# Patient Record
Sex: FEMALE | Race: WHITE | HISPANIC OR LATINO | Employment: UNEMPLOYED | URBAN - METROPOLITAN AREA
[De-identification: names, ages, dates, MRNs, and addresses within clinical notes are randomized per-mention and may not be internally consistent; named-entity substitution may affect disease eponyms.]

---

## 2017-01-04 ENCOUNTER — HOSPITAL ENCOUNTER (EMERGENCY)
Facility: MEDICAL CENTER | Age: 22
End: 2017-01-04
Attending: EMERGENCY MEDICINE
Payer: MEDICAID

## 2017-01-04 VITALS
DIASTOLIC BLOOD PRESSURE: 68 MMHG | HEIGHT: 65 IN | SYSTOLIC BLOOD PRESSURE: 131 MMHG | TEMPERATURE: 98.9 F | WEIGHT: 293 LBS | BODY MASS INDEX: 48.82 KG/M2 | OXYGEN SATURATION: 98 % | RESPIRATION RATE: 16 BRPM | HEART RATE: 74 BPM

## 2017-01-04 DIAGNOSIS — G43.009 MIGRAINE WITHOUT AURA AND WITHOUT STATUS MIGRAINOSUS, NOT INTRACTABLE: ICD-10-CM

## 2017-01-04 PROCEDURE — 36415 COLL VENOUS BLD VENIPUNCTURE: CPT

## 2017-01-04 PROCEDURE — 99284 EMERGENCY DEPT VISIT MOD MDM: CPT

## 2017-01-04 PROCEDURE — A9270 NON-COVERED ITEM OR SERVICE: HCPCS | Performed by: EMERGENCY MEDICINE

## 2017-01-04 PROCEDURE — 700102 HCHG RX REV CODE 250 W/ 637 OVERRIDE(OP): Performed by: EMERGENCY MEDICINE

## 2017-01-04 PROCEDURE — 700105 HCHG RX REV CODE 258: Performed by: EMERGENCY MEDICINE

## 2017-01-04 RX ORDER — DOXYLAMINE SUCCINATE AND PYRIDOXINE HYDROCHLORIDE, DELAYED RELEASE TABLETS 10 MG/10 MG 10; 10 MG/1; MG/1
1 TABLET, DELAYED RELEASE ORAL SEE ADMIN INSTRUCTIONS
Qty: 60 TAB | Refills: 0 | Status: ON HOLD | OUTPATIENT
Start: 2017-01-04 | End: 2017-08-01

## 2017-01-04 RX ORDER — ACETAMINOPHEN 325 MG/1
1000 TABLET ORAL ONCE
Status: COMPLETED | OUTPATIENT
Start: 2017-01-04 | End: 2017-01-04

## 2017-01-04 RX ORDER — SODIUM CHLORIDE 9 MG/ML
1000 INJECTION, SOLUTION INTRAVENOUS ONCE
Status: COMPLETED | OUTPATIENT
Start: 2017-01-04 | End: 2017-01-04

## 2017-01-04 RX ADMIN — ACETAMINOPHEN 975 MG: 325 TABLET, FILM COATED ORAL at 20:32

## 2017-01-04 RX ADMIN — SODIUM CHLORIDE 1000 ML: 9 INJECTION, SOLUTION INTRAVENOUS at 20:32

## 2017-01-04 ASSESSMENT — PAIN SCALES - GENERAL: PAINLEVEL_OUTOF10: 9

## 2017-01-04 ASSESSMENT — LIFESTYLE VARIABLES: DO YOU DRINK ALCOHOL: NO

## 2017-01-04 NOTE — ED AVS SNAPSHOT
[facility] Access Code: [email or web address]  Expires: 2/3/2017  9:21 AM    Your email address is not on file at [facility].  Email Addresses are required for you to sign up for [facility], please contact [phone] to verify your personal information and to provide your email address prior to attempting to register for [facility].    Mayra Rojas  [address] [place] [address]    [facility]  A secure, online tool to manage your health information     [facility]’s [facility]® is a secure, online tool that connects you to your personalized health information from the privacy of your home -- day or night - making it very easy for you to manage your healthcare. Once the activation process is completed, you can even access your medical information using the [facility] salbador, which is available for free in the Apple Salbador store or Google Play store.     To learn more about [facility], visit [email or web address]    There are two levels of access available (as shown below):   My Chart Features  [facility] Primary Care Doctor [facility]  Specialists [facility]  Urgent  Care Non-[facility] Primary Care Doctor   Email your healthcare team securely and privately 24/7 X X X    Manage appointments: schedule your next appointment; view details of past/upcoming appointments X      Request prescription refills. X      View recent personal medical records, including lab and immunizations X X X X   View health record, including health history, allergies, medications X X X X   Read reports about your outpatient visits, procedures, consult and ER notes X X X X   See your discharge summary, which is a recap of your hospital and/or ER visit that includes your diagnosis, lab results, and care plan X X  X     How to register for [facility]:  Once your e-mail address has been verified, follow the following steps to sign up for [facility].     1. Go to  [email or web address]  2. Click on the Sign Up Now box, which takes you to the New Member Sign Up  page. You will need to provide the following information:  a. Enter your [employer] Access Code exactly as it appears at the top of this page. (You will not need to use this code after you’ve completed the sign-up process. If you do not sign up before the expiration date, you must request a new code.)   b. Enter your date of birth.   c. Enter your home email address.   d. Click Submit, and follow the next screen’s instructions.  3. Create a [employer] ID. This will be your [employer] login ID and cannot be changed, so think of one that is secure and easy to remember.  4. Create a [employer] password. You can change your password at any time.  5. Enter your Password Reset Question and Answer. This can be used at a later time if you forget your password.   6. Enter your e-mail address. This allows you to receive e-mail notifications when new information is available in [employer]  7. Click Sign Up. You can now view your health information.    For assistance activating your [employer] account, call [phone]

## 2017-01-04 NOTE — ED AVS SNAPSHOT
After Visit Summary                                                                                                                Mayra Rojas   MRN: [id number]    Department:  [facility], Emergency Dept   Date of Visit:  1/4/2017            [facility], Emergency Dept    [address] [address]    Phone:  [phone]      You were seen by     Óscar Allen M.D.      Your Diagnosis Was     Migraine without aura and without status migrainosus, not intractable     G43.009       These are the medications you received during your hospitalization from 01/04/2017 1946 to 01/04/2017 2208     Date/Time Order Dose Route Action    01/04/2017 2032 NS infusion 1,000 mL 1,000 mL Intravenous New Bag    01/04/2017 2032 acetaminophen (TYLENOL) tablet 975 mg 975 mg Oral Given      Follow-up Information     1. Schedule an appointment as soon as possible for a visit with [facility], M.D..    Specialty:  OB/Gyn    Contact information    [address] [address]  [phone]        Medication Information     Review all of your home medications and newly ordered medications with your primary doctor and/or pharmacist as soon as possible. Follow medication instructions as directed by your doctor and/or pharmacist.     Please keep your complete medication list with you and share with your physician. Update the information when medications are discontinued, doses are changed, or new medications (including over-the-counter products) are added; and carry medication information at all times in the event of emergency situations.               Medication List      START taking these medications        Instructions    Doxylamine-Pyridoxine 10-10 MG Tbec delayed-release tablet   Commonly known as:  DICLEGIS    Doctor's comments:  If symptoms are controlled day 4, 1 tab morning and 2 tabs bedtime. If symptoms worse day 4,1 tab morning, 1 tab midafternoon,  and 2 tabs evening.   Take 1 Application by mouth See Admin Instructions. Day 1:  Two tabs at bedtime. If symptoms are controlled the next day, take 2 tabs at bedtime. If symptoms worse afternoon of day 2, take 2 tabs bedtime, 1 tab morningday 3, 2 tablets at bedtime. See below for additional dosing   Dose:  1 Application                 Discharge Instructions       You can take Tylenol at home for your headache.  Use the nausea medication as needed.    Migraine Headache  A migraine headache is an intense, throbbing pain on one or both sides of your head. A migraine can last for 30 minutes to several hours.  CAUSES   The exact cause of a migraine headache is not always known. However, a migraine may be caused when nerves in the brain become irritated and release chemicals that cause inflammation. This causes pain.  Certain things may also trigger migraines, such as:  · Alcohol.  · Smoking.  · Stress.  · Menstruation.  · Aged cheeses.  · Foods or drinks that contain nitrates, glutamate, aspartame, or tyramine.  · Lack of sleep.  · Chocolate.  · Caffeine.  · Hunger.  · Physical exertion.  · Fatigue.  · Medicines used to treat chest pain (nitroglycerine), birth control pills, estrogen, and some blood pressure medicines.  SIGNS AND SYMPTOMS  · Pain on one or both sides of your head.  · Pulsating or throbbing pain.  · Severe pain that prevents daily activities.  · Pain that is aggravated by any physical activity.  · Nausea, vomiting, or both.  · Dizziness.  · Pain with exposure to bright lights, loud noises, or activity.  · General sensitivity to bright lights, loud noises, or smells.  Before you get a migraine, you may get warning signs that a migraine is coming (aura). An aura may include:  · Seeing flashing lights.  · Seeing bright spots, halos, or zigzag lines.  · Having tunnel vision or blurred vision.  · Having feelings of numbness or tingling.  · Having trouble talking.  · Having muscle weakness.  DIAGNOSIS   A  migraine headache is often diagnosed based on:  · Symptoms.  · Physical exam.  · A CT scan or MRI of your head. These imaging tests cannot diagnose migraines, but they can help rule out other causes of headaches.  TREATMENT  Medicines may be given for pain and nausea. Medicines can also be given to help prevent recurrent migraines.   HOME CARE INSTRUCTIONS  · Only take over-the-counter or prescription medicines for pain or discomfort as directed by your health care provider. The use of long-term narcotics is not recommended.  · Lie down in a dark, quiet room when you have a migraine.  · Keep a journal to find out what may trigger your migraine headaches. For example, write down:  ¨ What you eat and drink.  ¨ How much sleep you get.  ¨ Any change to your diet or medicines.  · Limit alcohol consumption.  · Quit smoking if you smoke.  · Get 7-9 hours of sleep, or as recommended by your health care provider.  · Limit stress.  · Keep lights dim if bright lights bother you and make your migraines worse.  SEEK IMMEDIATE MEDICAL CARE IF:   · Your migraine becomes severe.  · You have a fever.  · You have a stiff neck.  · You have vision loss.  · You have muscular weakness or loss of muscle control.  · You start losing your balance or have trouble walking.  · You feel faint or pass out.  · You have severe symptoms that are different from your first symptoms.  MAKE SURE YOU:   · Understand these instructions.  · Will watch your condition.  · Will get help right away if you are not doing well or get worse.     This information is not intended to replace advice given to you by your health care provider. Make sure you discuss any questions you have with your health care provider.     Document Released: 12/18/2006 Document Revised: 01/08/2016 Document Reviewed: 08/25/2014  Elsevier Interactive Patient Education ©2016 Elsevier Inc.            Patient Information     Patient Information    Following emergency treatment: all patient  requiring follow-up care must return either to a private physician or a clinic if your condition worsens before you are able to obtain further medical attention, please return to the emergency room.     Billing Information    At [facility], we work to make the billing process streamlined for our patients.  Our Representatives are here to answer any questions you may have regarding your hospital bill.  If you have insurance coverage and have supplied your insurance information to us, we will submit a claim to your insurer on your behalf.  Should you have any questions regarding your bill, we can be reached online or by phone as follows:  Online: You are able pay your bills online or live chat with our representatives about any billing questions you may have. We are here to help Monday - Friday from 8:00am to 7:30pm and 9:00am - 12:00pm on Saturdays.  Please visit [email or web address]  for more information.   Phone:  [phone] or [phone]    Please note that your emergency physician, surgeon, pathologist, radiologist, anesthesiologist, and other specialists are not employed by [facility] and will therefore bill separately for their services.  Please contact them directly for any questions concerning their bills at the numbers below:     Emergency Physician Services:  [phone]  [facility]:  [phone]  [facility]:  [phone]  [facility]:  [phone]    1. Your final bill may vary from the amount quoted upon discharge if all procedures are not complete at that time, or if your doctor has additional procedures of which we are not aware. You will receive an additional bill if you return to the Emergency Department at [facility] for suture removal regardless of the facility of which the sutures were placed.     2. Please arrange for settlement of this account at the emergency registration.    3. All self-pay accounts  are due in full at the time of treatment.  If you are unable to meet this obligation then payment is expected within 4-5 days.     4. If you have had radiology studies (CT, X-ray, Ultrasound, MRI), you have received a preliminary result during your emergency department visit. Please contact the radiology department [phone] to receive a copy of your final result. Please discuss the Final result with your primary physician or with the follow up physician provided.     Crisis Hotline:  [place] Crisis Hotline:  6-724-PSQZMKB or 1-313.402.5677  [place] Crisis Hotline:    1-846.140.2895 or 700-374-4027         ED Discharge Follow Up Questions    1. In order to provide you with very good care, we would like to follow up with a phone call in the next few days.  May we have your permission to contact you?     YES /  NO    2. What is the best phone number to call you? (       )_____-__________    3. What is the best time to call you?      Morning  /  Afternoon  /  Evening                   Patient Signature:  ____________________________________________________________    Date:  ____________________________________________________________

## 2017-01-04 NOTE — ED AVS SNAPSHOT
1/4/2017          Mayra Rojas  [address] [place] [address]    Dear Mayra:    [facility] wants to ensure your discharge home is safe and you or your loved ones have had all your questions answered regarding your care after you leave the hospital.    You may receive a telephone call within two days of your discharge.  This call is to make certain you understand your discharge instructions as well as ensure we provided you with the best care possible during your stay with us.     The call will only last approximately 3-5 minutes and will be done by a nurse.    Once again, we want to ensure your discharge home is safe and that you have a clear understanding of any next steps in your care.  If you have any questions or concerns, please do not hesitate to contact us, we are here for you.  Thank you for choosing [facility] for your healthcare needs.    Sincerely,    Zeke Fisher    [facility]

## 2017-01-05 NOTE — ED NOTES
"Chief Complaint   Patient presents with   • Migraine   • N/V   • Pregnancy     Ht 1.651 m (5' 5\")  Wt 154.223 kg (340 lb)  BMI 56.58 kg/m2  LMP 01/15/2015    Pt BIB [facility] - migraine x 4days, N/V today, pregnant unknown (12-16 weeks). Per [facility] RN/[facility] - house pt came from extremely disheveled, smelled like marijuana.     Pt ambulatory to bathroom, urine sample obtained. Connected to monitor, blood drawn.     Chart up for eval.    "

## 2017-01-05 NOTE — DISCHARGE INSTRUCTIONS
You can take Tylenol at home for your headache.  Use the nausea medication as needed.    Migraine Headache  A migraine headache is an intense, throbbing pain on one or both sides of your head. A migraine can last for 30 minutes to several hours.  CAUSES   The exact cause of a migraine headache is not always known. However, a migraine may be caused when nerves in the brain become irritated and release chemicals that cause inflammation. This causes pain.  Certain things may also trigger migraines, such as:  · Alcohol.  · Smoking.  · Stress.  · Menstruation.  · Aged cheeses.  · Foods or drinks that contain nitrates, glutamate, aspartame, or tyramine.  · Lack of sleep.  · Chocolate.  · Caffeine.  · Hunger.  · Physical exertion.  · Fatigue.  · Medicines used to treat chest pain (nitroglycerine), birth control pills, estrogen, and some blood pressure medicines.  SIGNS AND SYMPTOMS  · Pain on one or both sides of your head.  · Pulsating or throbbing pain.  · Severe pain that prevents daily activities.  · Pain that is aggravated by any physical activity.  · Nausea, vomiting, or both.  · Dizziness.  · Pain with exposure to bright lights, loud noises, or activity.  · General sensitivity to bright lights, loud noises, or smells.  Before you get a migraine, you may get warning signs that a migraine is coming (aura). An aura may include:  · Seeing flashing lights.  · Seeing bright spots, halos, or zigzag lines.  · Having tunnel vision or blurred vision.  · Having feelings of numbness or tingling.  · Having trouble talking.  · Having muscle weakness.  DIAGNOSIS   A migraine headache is often diagnosed based on:  · Symptoms.  · Physical exam.  · A CT scan or MRI of your head. These imaging tests cannot diagnose migraines, but they can help rule out other causes of headaches.  TREATMENT  Medicines may be given for pain and nausea. Medicines can also be given to help prevent recurrent migraines.   HOME CARE INSTRUCTIONS  · Only take  over-the-counter or prescription medicines for pain or discomfort as directed by your health care provider. The use of long-term narcotics is not recommended.  · Lie down in a dark, quiet room when you have a migraine.  · Keep a journal to find out what may trigger your migraine headaches. For example, write down:  ¨ What you eat and drink.  ¨ How much sleep you get.  ¨ Any change to your diet or medicines.  · Limit alcohol consumption.  · Quit smoking if you smoke.  · Get 7-9 hours of sleep, or as recommended by your health care provider.  · Limit stress.  · Keep lights dim if bright lights bother you and make your migraines worse.  SEEK IMMEDIATE MEDICAL CARE IF:   · Your migraine becomes severe.  · You have a fever.  · You have a stiff neck.  · You have vision loss.  · You have muscular weakness or loss of muscle control.  · You start losing your balance or have trouble walking.  · You feel faint or pass out.  · You have severe symptoms that are different from your first symptoms.  MAKE SURE YOU:   · Understand these instructions.  · Will watch your condition.  · Will get help right away if you are not doing well or get worse.     This information is not intended to replace advice given to you by your health care provider. Make sure you discuss any questions you have with your health care provider.     Document Released: 12/18/2006 Document Revised: 01/08/2016 Document Reviewed: 08/25/2014  [employer] Interactive Patient Education ©2016 "[employer] Inc[employer]

## 2017-01-05 NOTE — ED NOTES
Pt given discharge and follow up instructions and prescriptions, all questions answered, pt verbalized understanding. Pt discharged with self, calling for friend in lobby. Copy of discharge provided to pt. Signed copy in chart.  Pt states that all personal belongings are in possession. Pt ambulatory off unit.

## 2017-01-05 NOTE — ED PROVIDER NOTES
ER PROVIDER NOTE    Scribed for Óscar Allen M.D. by Jenise Mauro. 2017 at 8:23 PM.    Primary Care Provider: Pcp Pt States None  Means of Arrival: EMS  History obtained from: patient   History limited by: None      CHIEF COMPLAINT  Chief Complaint   Patient presents with   • Migraine   • N/V   • Pregnancy       HPI  Mayra Rojas is a 21 y.o. female who presents to the emergency department with a migraine. Patient reports the migraine began four days ago. Patient has a long-standing history of migraines and this feels exactly the same. Onset of symptoms was gradual over several hours, reports his a pressure behind her eyes. No neck pain or stiffness. No fevers or chills. Has some photophobia but no other visual symptoms. Denies any focal weakness numbness or tingling. Per patient, she took Tylenol but denies relief of symptoms. She states associated nausea and vomiting that began today that is also common in her migraines. Patient reports she is approximately 12-16 weeks pregnant.  Denies any vaginal bleeding, vaginal discharge or abdominal pain or dysuria at this time. Per patient, she reports last migraine occured when she was last pregnant. She is . Patient denies any other medical problems.     REVIEW OF SYSTEMS  Pertinent positives include migraine, nausea, vomiting. Pertinent negatives include no vaginal bleeding, dysuria. See HPI for details. All other systems reviewed and are negative.    PAST MEDICAL HISTORY       SOCIAL HISTORY  Social History   Substance Use Topics   • Smoking status: Never Smoker    • Alcohol Use: No       SURGICAL HISTORY  patient denies any surgical history    CURRENT MEDICATIONS  Home Medications     Reviewed by Mary Peña R.N. (Registered Nurse) on 17 at 1951  Med List Status: Complete    Medication Last Dose Status          Patient Josse Taking any Medications                        ALLERGIES  Allergies   Allergen Reactions   • Nkda  "[No Known Drug Allergy]        PHYSICAL EXAM  VITAL SIGNS: /68 mmHg  Pulse 74  Temp(Src) 37.2 °C (98.9 °F)  Resp 16  Ht 1.651 m (5' 5\")  Wt 154.223 kg (340 lb)  BMI 56.58 kg/m2  SpO2 98%  LMP 01/15/2015    Filed Vitals:    01/04/17 1950 01/04/17 1958   BP:  131/68   Pulse:  74   Temp:  37.2 °C (98.9 °F)   Resp:  16   Height: 1.651 m (5' 5\")    Weight: 154.223 kg (340 lb)    SpO2:  98%         Pulse ox interpretation: I interpret this pulse ox as normal.  Constitutional: Alert in no apparent distress.  HENT: No signs of trauma, Bilateral external ears normal, Nose normal.   Eyes: Pupils are equal and reactive, Conjunctiva normal, Non-icteric.   Neck: Normal range of motion, No tenderness, Supple, No stridor.   Lymphatic: No lymphadenopathy noted.   Cardiovascular: Regular rate and rhythm, no murmurs.   Thorax & Lungs: Normal breath sounds, No respiratory distress, No wheezing, No chest tenderness.   Abdomen: Gravid uterus, Bowel sounds normal, Soft, No tenderness, No masses, No pulsatile masses. No peritoneal signs.  Skin: Warm, Dry, No erythema, No rash.   Back: No bony tenderness, No CVA tenderness.   Extremities: Intact distal pulses, No edema, No tenderness, No cyanosis, Negative Brett's sign.  Musculoskeletal: Good range of motion in all major joints. No tenderness to palpation or major deformities noted.   Neurologic: Alert, cranial nerves intact, speech is appropriate or not slurred, upper extremities bilaterally exhibit no drift, no dysmetria, 5 out of 5 strength with bilateral bicep/tricep/, sensation intact to light touch throughout upper extremities. Lower extremities strength 5 out of 5 thigh extension/flexion/abduction/adduction, knee extension/flexion, dorsiflexion plantar flexion. No clonus.  2+ patella reflexes.  sensation intact to light touch.  No focal deficits noted. Ambulates with steady gait, steady tandem gait  Psychiatric: Affect normal, Judgment normal, Mood normal. "       COURSE & MEDICAL DECISION MAKING  Nursing notes, VS, PMSFHx reviewed in chart.    8:23 PM - Patient seen and examined at bedside. Patient was sleeping upon at arrival to bedside. Patient will be treated with NS infusion 1,000 mL. Ordered for Tylenol 975 mg to evaluate her symptoms.     9:57 PM - Recheck: Patient is resting comfortably and reports feeling improved, headache has resolved. I explained that she is now stable for discharge with a prescription for Diclegis 10 MG. I advised him to follow up with his primary care provider and to return to the ED if worsening of migraine symptoms. She understands and will comply.     Decision Making:  This is a 21 y.o. female presenting with headache. Likely migraine, given the exact similarity to past as well as, the lack of rapid onset and severity of the headache, in addition to the well appearance of the pt makes the dx of subarchnoid hemorrhage less likely. The normal vital signs, lack of a temperature and lack of meningeal signs (supple neck without pain with rom) makes the dx of meningitis less likely. Given the resolution of her symptoms, her well appearance without neurologic findings do not think she has dural venous thrombosis at this time as well. No edema or significant hypertension to suggest preeclampsia. The absence of neurologic signs and the short duration of sx make neoplasm unlikely. There is no history or physical exam sign of trauma, and a normal neurologic exam making any traumatic head bleed (sdh/sah/iph/edh) unlikely.     The patient is improved with normal vitals, a normal neurologic exam, normal gait, and is discharged home with pcp follow-up and return precautions.     The patient will return for new or worsening symptoms and is stable at the time of discharge.    The patient is referred to a primary physician for blood pressure management, diabetic screening, and for all other preventative health concerns.    DISPOSITION:  Patient will be  discharged home in stable condition.    FOLLOW UP:  [facility], M.D[facility]  [address] [place] [address]  [place] [address]  [phone]    Schedule an appointment as soon as possible for a visit      FINAL IMPRESSION  1. Migraine without aura and without status migrainosus, not intractable          Jenise STONE (Scribe), am scribing for, and in the presence of, Óscar Allen M.D..    Electronically signed by: Jenise Mauro (Scribe), 1/4/2017    Óscar STONE M.D. personally performed the services described in this documentation, as scribed by Jenise Mauro in my presence, and it is both accurate and complete.    The note accurately reflects work and decisions made by me.  Óscar Allen  1/5/2017  12:43 AM

## 2017-02-22 ENCOUNTER — HOSPITAL ENCOUNTER (OUTPATIENT)
Dept: LAB | Facility: MEDICAL CENTER | Age: 22
End: 2017-02-22
Attending: SPECIALIST
Payer: MEDICAID

## 2017-02-22 LAB
ABO GROUP BLD: NORMAL
BLD GP AB SCN SERPL QL: NORMAL
HBV SURFACE AG SERPL QL IA: NEGATIVE
HCT VFR BLD AUTO: 42.3 % (ref 37–47)
HGB BLD-MCNC: 13.6 G/DL (ref 12–16)
RH BLD: NORMAL
RUBV IGG SERPL IA-ACNC: 12.6 IU/ML
TREPONEMA PALLIDUM IGG+IGM AB [PRESENCE] IN SERUM OR PLASMA BY IMMUNOASSAY: NON REACTIVE

## 2017-02-22 PROCEDURE — 85018 HEMOGLOBIN: CPT

## 2017-02-22 PROCEDURE — 86900 BLOOD TYPING SEROLOGIC ABO: CPT

## 2017-02-22 PROCEDURE — 87340 HEPATITIS B SURFACE AG IA: CPT

## 2017-02-22 PROCEDURE — 36415 COLL VENOUS BLD VENIPUNCTURE: CPT

## 2017-02-22 PROCEDURE — 86780 TREPONEMA PALLIDUM: CPT

## 2017-02-22 PROCEDURE — 81511 FTL CGEN ABNOR FOUR ANAL: CPT

## 2017-02-22 PROCEDURE — 86850 RBC ANTIBODY SCREEN: CPT

## 2017-02-22 PROCEDURE — 85014 HEMATOCRIT: CPT

## 2017-02-22 PROCEDURE — 86762 RUBELLA ANTIBODY: CPT

## 2017-02-22 PROCEDURE — 86901 BLOOD TYPING SEROLOGIC RH(D): CPT

## 2017-02-25 LAB
# FETUSES US: NORMAL
AFP MOM SERPL: 0.75
AFP SERPL-MCNC: 18 NG/ML
AGE - REPORTED: 24.1 YR
GA METHOD: NORMAL
GA: 17.14 WEEKS
HCG MOM SERPL: 0.29
HCG SERPL-ACNC: 5748 IU/L
IDDM PATIENT QL: NO
INHIBIN A MOM SERPL: 0.86
INHIBIN A SERPL-MCNC: 94 PG/ML
INTEGRATED SCN PATIENT-IMP: NORMAL
PATHOLOGY STUDY: NORMAL
U ESTRIOL MOM SERPL: 1.06
U ESTRIOL SERPL-MCNC: 0.95 NG/ML

## 2017-04-19 ENCOUNTER — APPOINTMENT (OUTPATIENT)
Dept: LAB | Facility: MEDICAL CENTER | Age: 22
End: 2017-04-19
Payer: MEDICAID

## 2017-04-22 ENCOUNTER — HOSPITAL ENCOUNTER (OUTPATIENT)
Facility: MEDICAL CENTER | Age: 22
End: 2017-04-22
Attending: SPECIALIST | Admitting: SPECIALIST
Payer: MEDICAID

## 2017-04-22 VITALS
BODY MASS INDEX: 48.82 KG/M2 | WEIGHT: 293 LBS | HEIGHT: 65 IN | HEART RATE: 93 BPM | SYSTOLIC BLOOD PRESSURE: 126 MMHG | RESPIRATION RATE: 16 BRPM | DIASTOLIC BLOOD PRESSURE: 73 MMHG | TEMPERATURE: 97.8 F

## 2017-04-22 LAB
ALBUMIN SERPL BCP-MCNC: 3.4 G/DL (ref 3.2–4.9)
ALBUMIN/GLOB SERPL: 1 G/DL
ALP SERPL-CCNC: 94 U/L (ref 30–99)
ALT SERPL-CCNC: 23 U/L (ref 2–50)
AMYLASE SERPL-CCNC: 65 U/L (ref 20–103)
ANION GAP SERPL CALC-SCNC: 9 MMOL/L (ref 0–11.9)
AST SERPL-CCNC: 14 U/L (ref 12–45)
BILIRUB SERPL-MCNC: 0.2 MG/DL (ref 0.1–1.5)
BUN SERPL-MCNC: 9 MG/DL (ref 8–22)
CALCIUM SERPL-MCNC: 8.7 MG/DL (ref 8.5–10.5)
CHLORIDE SERPL-SCNC: 106 MMOL/L (ref 96–112)
CO2 SERPL-SCNC: 23 MMOL/L (ref 20–33)
CREAT SERPL-MCNC: 0.58 MG/DL (ref 0.5–1.4)
FIBRONECTIN FETAL SPEC QL: NEGATIVE
GFR SERPL CREATININE-BSD FRML MDRD: >60 ML/MIN/1.73 M 2
GLOBULIN SER CALC-MCNC: 3.5 G/DL (ref 1.9–3.5)
GLUCOSE SERPL-MCNC: 88 MG/DL (ref 65–99)
LIPASE SERPL-CCNC: 36 U/L (ref 11–82)
POTASSIUM SERPL-SCNC: 3.6 MMOL/L (ref 3.6–5.5)
PROT SERPL-MCNC: 6.9 G/DL (ref 6–8.2)
SODIUM SERPL-SCNC: 138 MMOL/L (ref 135–145)

## 2017-04-22 PROCEDURE — 83690 ASSAY OF LIPASE: CPT

## 2017-04-22 PROCEDURE — 36415 COLL VENOUS BLD VENIPUNCTURE: CPT

## 2017-04-22 PROCEDURE — 82150 ASSAY OF AMYLASE: CPT

## 2017-04-22 PROCEDURE — 82731 ASSAY OF FETAL FIBRONECTIN: CPT

## 2017-04-22 PROCEDURE — 80053 COMPREHEN METABOLIC PANEL: CPT

## 2017-04-23 NOTE — PROGRESS NOTES
EDC aug 1 17, 25.4 weeks pt is here with C/O decreased fetal movements and pain in upper abdomen when she eats and lower abdomen that radiated to her back.  with good variability. No decels, Dr Ortega was called report given, orders received. FFN was sent to lab,  Dr Ortega was called with results, report given FFN is negative, pt has high BMI and difficult to monitor UCs but no uc has been seen. Pt now feels a lot of movements . CMP and other labs reported to Dr Ortega and order received to discharge pt home.  discharge instructions for low fat diet and  labor was given and pt was discharged home.

## 2017-04-26 ENCOUNTER — HOSPITAL ENCOUNTER (OUTPATIENT)
Dept: LAB | Facility: MEDICAL CENTER | Age: 22
End: 2017-04-26
Attending: SPECIALIST
Payer: MEDICAID

## 2017-04-26 LAB
GLUCOSE 1H P 50 G GLC PO SERPL-MCNC: 135 MG/DL (ref 70–139)
HCT VFR BLD AUTO: 38.3 % (ref 37–47)
HGB BLD-MCNC: 12.5 G/DL (ref 12–16)

## 2017-04-26 PROCEDURE — 85018 HEMOGLOBIN: CPT

## 2017-04-26 PROCEDURE — 85014 HEMATOCRIT: CPT

## 2017-04-26 PROCEDURE — 36415 COLL VENOUS BLD VENIPUNCTURE: CPT

## 2017-04-26 PROCEDURE — 82950 GLUCOSE TEST: CPT

## 2017-07-30 ENCOUNTER — HOSPITAL ENCOUNTER (OUTPATIENT)
Facility: MEDICAL CENTER | Age: 22
End: 2017-07-30
Attending: SPECIALIST | Admitting: SPECIALIST
Payer: MEDICAID

## 2017-07-30 VITALS
SYSTOLIC BLOOD PRESSURE: 124 MMHG | TEMPERATURE: 97.5 F | BODY MASS INDEX: 48.82 KG/M2 | HEIGHT: 65 IN | WEIGHT: 293 LBS | HEART RATE: 103 BPM | DIASTOLIC BLOOD PRESSURE: 86 MMHG

## 2017-07-30 PROCEDURE — 59025 FETAL NON-STRESS TEST: CPT | Performed by: SPECIALIST

## 2017-07-31 NOTE — PROGRESS NOTES
21y/o  edc 17, EGA 39 , Here to l&d room LDA 2 with family. C/O UC's. EFM/TOCO applied, patients states positive fetal movement. Denies vaginal bleeding or leaking of fluid. SVE 3/thick/-3  2056 Dr Ortega updated and orders received   discharge instructions provided   patient off floor stable on feet

## 2017-08-01 ENCOUNTER — HOSPITAL ENCOUNTER (INPATIENT)
Facility: MEDICAL CENTER | Age: 22
LOS: 2 days | End: 2017-08-03
Attending: SPECIALIST | Admitting: SPECIALIST
Payer: MEDICAID

## 2017-08-01 LAB
BASOPHILS # BLD AUTO: 0.3 % (ref 0–1.8)
BASOPHILS # BLD: 0.02 K/UL (ref 0–0.12)
EOSINOPHIL # BLD AUTO: 0.1 K/UL (ref 0–0.51)
EOSINOPHIL NFR BLD: 1.4 % (ref 0–6.9)
ERYTHROCYTE [DISTWIDTH] IN BLOOD BY AUTOMATED COUNT: 42.9 FL (ref 35.9–50)
HCT VFR BLD AUTO: 38 % (ref 37–47)
HGB BLD-MCNC: 12.2 G/DL (ref 12–16)
HOLDING TUBE BB 8507: NORMAL
IMM GRANULOCYTES # BLD AUTO: 0.02 K/UL (ref 0–0.11)
IMM GRANULOCYTES NFR BLD AUTO: 0.3 % (ref 0–0.9)
LYMPHOCYTES # BLD AUTO: 1.56 K/UL (ref 1–4.8)
LYMPHOCYTES NFR BLD: 21.5 % (ref 22–41)
MCH RBC QN AUTO: 26.8 PG (ref 27–33)
MCHC RBC AUTO-ENTMCNC: 32.1 G/DL (ref 33.6–35)
MCV RBC AUTO: 83.5 FL (ref 81.4–97.8)
MONOCYTES # BLD AUTO: 0.37 K/UL (ref 0–0.85)
MONOCYTES NFR BLD AUTO: 5.1 % (ref 0–13.4)
NEUTROPHILS # BLD AUTO: 5.19 K/UL (ref 2–7.15)
NEUTROPHILS NFR BLD: 71.4 % (ref 44–72)
NRBC # BLD AUTO: 0 K/UL
NRBC BLD AUTO-RTO: 0 /100 WBC
PLATELET # BLD AUTO: 251 K/UL (ref 164–446)
PMV BLD AUTO: 9.6 FL (ref 9–12.9)
RBC # BLD AUTO: 4.55 M/UL (ref 4.2–5.4)
WBC # BLD AUTO: 7.3 K/UL (ref 4.8–10.8)

## 2017-08-01 PROCEDURE — A9270 NON-COVERED ITEM OR SERVICE: HCPCS | Performed by: SPECIALIST

## 2017-08-01 PROCEDURE — 304965 HCHG RECOVERY SERVICES

## 2017-08-01 PROCEDURE — 700105 HCHG RX REV CODE 258

## 2017-08-01 PROCEDURE — 10H07YZ INSERTION OF OTHER DEVICE INTO PRODUCTS OF CONCEPTION, VIA NATURAL OR ARTIFICIAL OPENING: ICD-10-PCS | Performed by: SPECIALIST

## 2017-08-01 PROCEDURE — 36415 COLL VENOUS BLD VENIPUNCTURE: CPT

## 2017-08-01 PROCEDURE — 700111 HCHG RX REV CODE 636 W/ 250 OVERRIDE (IP): Performed by: SPECIALIST

## 2017-08-01 PROCEDURE — 85025 COMPLETE CBC W/AUTO DIFF WBC: CPT

## 2017-08-01 PROCEDURE — 303615 HCHG EPIDURAL/SPINAL ANESTHESIA FOR LABOR

## 2017-08-01 PROCEDURE — 700105 HCHG RX REV CODE 258: Performed by: SPECIALIST

## 2017-08-01 PROCEDURE — 4A1H74Z MONITORING OF PRODUCTS OF CONCEPTION, CARDIAC ELECTRICAL ACTIVITY, VIA NATURAL OR ARTIFICIAL OPENING: ICD-10-PCS | Performed by: SPECIALIST

## 2017-08-01 PROCEDURE — 770002 HCHG ROOM/CARE - OB PRIVATE (112)

## 2017-08-01 PROCEDURE — 700111 HCHG RX REV CODE 636 W/ 250 OVERRIDE (IP)

## 2017-08-01 PROCEDURE — 4A1HX4Z MONITORING OF PRODUCTS OF CONCEPTION, CARDIAC ELECTRICAL ACTIVITY, EXTERNAL APPROACH: ICD-10-PCS | Performed by: SPECIALIST

## 2017-08-01 PROCEDURE — 59409 OBSTETRICAL CARE: CPT

## 2017-08-01 PROCEDURE — 3E033VJ INTRODUCTION OF OTHER HORMONE INTO PERIPHERAL VEIN, PERCUTANEOUS APPROACH: ICD-10-PCS | Performed by: SPECIALIST

## 2017-08-01 PROCEDURE — 700102 HCHG RX REV CODE 250 W/ 637 OVERRIDE(OP): Performed by: SPECIALIST

## 2017-08-01 RX ORDER — ROPIVACAINE HYDROCHLORIDE 2 MG/ML
INJECTION, SOLUTION EPIDURAL; INFILTRATION; PERINEURAL
Status: COMPLETED
Start: 2017-08-01 | End: 2017-08-01

## 2017-08-01 RX ORDER — OXYCODONE HYDROCHLORIDE AND ACETAMINOPHEN 5; 325 MG/1; MG/1
1 TABLET ORAL EVERY 4 HOURS PRN
Status: DISCONTINUED | OUTPATIENT
Start: 2017-08-01 | End: 2017-08-03 | Stop reason: HOSPADM

## 2017-08-01 RX ORDER — IBUPROFEN 800 MG/1
800 TABLET ORAL EVERY 8 HOURS PRN
Status: DISCONTINUED | OUTPATIENT
Start: 2017-08-01 | End: 2017-08-03 | Stop reason: HOSPADM

## 2017-08-01 RX ORDER — ONDANSETRON 2 MG/ML
4 INJECTION INTRAMUSCULAR; INTRAVENOUS EVERY 6 HOURS PRN
Status: DISCONTINUED | OUTPATIENT
Start: 2017-08-01 | End: 2017-08-03 | Stop reason: HOSPADM

## 2017-08-01 RX ORDER — OXYCODONE AND ACETAMINOPHEN 10; 325 MG/1; MG/1
1 TABLET ORAL EVERY 4 HOURS PRN
Status: DISCONTINUED | OUTPATIENT
Start: 2017-08-01 | End: 2017-08-03 | Stop reason: HOSPADM

## 2017-08-01 RX ORDER — MISOPROSTOL 200 UG/1
800 TABLET ORAL
Status: DISCONTINUED | OUTPATIENT
Start: 2017-08-01 | End: 2017-08-02 | Stop reason: HOSPADM

## 2017-08-01 RX ORDER — NITROFURANTOIN 25; 75 MG/1; MG/1
100 CAPSULE ORAL 2 TIMES DAILY
COMMUNITY
End: 2018-09-05

## 2017-08-01 RX ORDER — ONDANSETRON 4 MG/1
4 TABLET, ORALLY DISINTEGRATING ORAL EVERY 6 HOURS PRN
Status: DISCONTINUED | OUTPATIENT
Start: 2017-08-01 | End: 2017-08-03 | Stop reason: HOSPADM

## 2017-08-01 RX ORDER — BUPIVACAINE HYDROCHLORIDE 2.5 MG/ML
INJECTION, SOLUTION EPIDURAL; INFILTRATION; INTRACAUDAL
Status: DISCONTINUED
Start: 2017-08-01 | End: 2017-08-02

## 2017-08-01 RX ORDER — SODIUM CHLORIDE, SODIUM LACTATE, POTASSIUM CHLORIDE, CALCIUM CHLORIDE 600; 310; 30; 20 MG/100ML; MG/100ML; MG/100ML; MG/100ML
INJECTION, SOLUTION INTRAVENOUS CONTINUOUS
Status: DISPENSED | OUTPATIENT
Start: 2017-08-01 | End: 2017-08-01

## 2017-08-01 RX ORDER — SODIUM CHLORIDE, SODIUM LACTATE, POTASSIUM CHLORIDE, CALCIUM CHLORIDE 600; 310; 30; 20 MG/100ML; MG/100ML; MG/100ML; MG/100ML
INJECTION, SOLUTION INTRAVENOUS
Status: COMPLETED
Start: 2017-08-01 | End: 2017-08-01

## 2017-08-01 RX ADMIN — Medication 2000 ML/HR: at 22:42

## 2017-08-01 RX ADMIN — SODIUM CHLORIDE, POTASSIUM CHLORIDE, SODIUM LACTATE AND CALCIUM CHLORIDE: 600; 310; 30; 20 INJECTION, SOLUTION INTRAVENOUS at 13:22

## 2017-08-01 RX ADMIN — SODIUM CHLORIDE, POTASSIUM CHLORIDE, SODIUM LACTATE AND CALCIUM CHLORIDE 1000 ML: 600; 310; 30; 20 INJECTION, SOLUTION INTRAVENOUS at 09:40

## 2017-08-01 RX ADMIN — SODIUM CHLORIDE, POTASSIUM CHLORIDE, SODIUM LACTATE AND CALCIUM CHLORIDE 1000 ML: 600; 310; 30; 20 INJECTION, SOLUTION INTRAVENOUS at 14:04

## 2017-08-01 RX ADMIN — SODIUM CHLORIDE, POTASSIUM CHLORIDE, SODIUM LACTATE AND CALCIUM CHLORIDE: 600; 310; 30; 20 INJECTION, SOLUTION INTRAVENOUS at 19:42

## 2017-08-01 RX ADMIN — ROPIVACAINE HYDROCHLORIDE 100 ML: 2 INJECTION, SOLUTION EPIDURAL; INFILTRATION at 22:03

## 2017-08-01 RX ADMIN — OXYCODONE HYDROCHLORIDE AND ACETAMINOPHEN 1 TABLET: 5; 325 TABLET ORAL at 22:55

## 2017-08-01 RX ADMIN — ROPIVACAINE HYDROCHLORIDE 10 ML: 2 INJECTION, SOLUTION EPIDURAL; INFILTRATION at 13:59

## 2017-08-01 RX ADMIN — IBUPROFEN 800 MG: 800 TABLET, FILM COATED ORAL at 22:55

## 2017-08-01 RX ADMIN — Medication 125 ML/HR: at 23:14

## 2017-08-01 RX ADMIN — Medication 2 MILLI-UNITS/MIN: at 10:24

## 2017-08-01 ASSESSMENT — LIFESTYLE VARIABLES
EVER_SMOKED: NEVER
DO YOU DRINK ALCOHOL: NO
ALCOHOL_USE: NO
DO YOU DRINK ALCOHOL: NO

## 2017-08-01 NOTE — PROGRESS NOTES
I received a call from the patient's nervous and was informed that the patient's cervix was 9 cm and that there was a bulging bag of water. Her nurse recommended that I come to the hospital. Until I arrived here in labor and delivery and check the patient's cervix and bulging bag of water was noted and it did indeed appear that the patient's cervix was about 9 cm dilated. Artificial rupture membranes was performed and the amniotic fluid was noted to be moderately stained with meconium. The station then was found to be at least -2 station. A fetal scalp electrode was placed but it did not appear that the tracing was adequate and so a 2nd fetal scalp electrode was placed. Evidence of a fetal arrhythmia was detected. The external monitor was then replaced on the patient's abdomen and continued. It appears that the fetal heart tracing is category 1. It does appear that the patient is having frequent contractions. Of note the patient did before I returned to the hospital receive a labor epidural and the patient's labor epidural appears to be functioning well. We will continue electronic fetal monitoring and anticipate an obstetrical delivery.  Logan Ortega M.D.

## 2017-08-01 NOTE — IP AVS SNAPSHOT
LearnSomething Access Code: [email or web address]  Expires: 9/2/2017 10:30 AM    Your email address is not on file at [facility] [employer]  Email Addresses are required for you to sign up for LearnSomething, please contact [phone] to verify your personal information and to provide your email address prior to attempting to register for LearnSomething.    Mayra Rojas  [address] [place] [address]    LearnSomething  A secure, online tool to manage your health information     [employer]s LearnSomething® is a secure, online tool that connects you to your personalized health information from the privacy of your home -- day or night - making it very easy for you to manage your healthcare. Once the activation process is completed, you can even access your medical information using the LearnSomething salbador, which is available for free in the Apple Salbador store or Google Play store.     To learn more about LearnSomething, visit [email or web address]    There are two levels of access available (as shown below):   My Chart Features  [facility] Primary Care Doctor [facility]  Specialists [facility]  Urgent  Care Non-[facility] Primary Care Doctor   Email your healthcare team securely and privately 24/7 X X X    Manage appointments: schedule your next appointment; view details of past/upcoming appointments X      Request prescription refills. X      View recent personal medical records, including lab and immunizations X X X X   View health record, including health history, allergies, medications X X X X   Read reports about your outpatient visits, procedures, consult and ER notes X X X X   See your discharge summary, which is a recap of your hospital and/or ER visit that includes your diagnosis, lab results, and care plan X X  X     How to register for [employer]:  Once your e-mail address has been verified, follow the following steps to sign up for Whatevert.     1. Go to  [email or web address]  2. Click on the Sign Up Now box, which takes you to the New Member Sign Up  page. You will need to provide the following information:  a. Enter your [employer] Access Code exactly as it appears at the top of this page. (You will not need to use this code after you’ve completed the sign-up process. If you do not sign up before the expiration date, you must request a new code.)   b. Enter your date of birth.   c. Enter your home email address.   d. Click Submit, and follow the next screen’s instructions.  3. Create a [employer] ID. This will be your [employer] login ID and cannot be changed, so think of one that is secure and easy to remember.  4. Create a [employer] password. You can change your password at any time.  5. Enter your Password Reset Question and Answer. This can be used at a later time if you forget your password.   6. Enter your e-mail address. This allows you to receive e-mail notifications when new information is available in [employer].  7. Click Sign Up. You can now view your health information.    For assistance activating your [employer] account, call [phone]

## 2017-08-01 NOTE — CARE PLAN
Problem: Knowledge Deficit  Goal: Patient/Support person demonstrates understanding regarding the progression of labor, available options and participates in decision-making process  Outcome: PROGRESSING AS EXPECTED  POC discussed with patient, all questions answered at this time,    Problem: Pain  Goal: Alleviation of Pain or a reduction in pain to the patient’s comfort goal  Outcome: PROGRESSING AS EXPECTED  Pt coping with pain.

## 2017-08-01 NOTE — IP AVS SNAPSHOT
Home Care Instructions                                                                                                                Mayra Rojas   MRN: [id number]    Department:  POST PARTUM 31   2017           Follow-up Information     1. Follow up with Logan Ortega M.D.. Schedule an appointment as soon as possible for a visit in 6 weeks.    Specialty:  OB/Gyn    Why:  Follow up visit    Contact information    [address]  [place] [address]  [phone]         I assume responsibility for securing a follow-up [place] Screening blood test on my baby within the specified date range.    -                  Discharge Instructions       POSTPARTUM DISCHARGE INSTRUCTIONS FOR MOM    YOB: 1995   Age: 22 y.o.               Admit Date: 2017     Discharge Date: 8/3/2017  Attending Doctor:  Logan Ortega M.D.                  Allergies:  Nkda    Discharged to home by car. Discharged via wheelchair, hospital escort: Yes.  Special equipment needed: Not Applicable  Belongings with: Personal  Be sure to schedule a follow-up appointment with your primary care doctor or any specialists as instructed.     Discharge Plan:   Diet Plan: Discussed  Confirmed Follow up Appointment: Patient to Call and Schedule Appointment  Confirmed Symptoms Management: Discussed  Medication Reconciliation Updated: Yes    REASONS TO CALL YOUR OBSTETRICIAN:  1.   Persistent fever or shaking chills (Temperature higher than 100.4)  2.   Heavy bleeding (soaking more than 1 pad per hour); Passing clots  3.   Foul odor from vagina  4.   Mastitis (Breast infection; breast pain, chills, fever, redness)  5.   Urinary pain, burning or frequency  6.   Episiotomy infection  7.   Severe depression longer than 24 hours    HAND WASHING  · Prior to handling the baby.  · Before breastfeeding or bottle feeding baby.  · After using the bathroom or changing the baby's diaper.      VAGINAL CARE  · Nothing inside vagina for 6  "weeks: no sexual intercourse, tampons or douching.  · Bleeding may continue for 2-4 weeks.  Amount may vary.    · Call your physician for heavy bleeding which means soaking more than 1 pad per hour    BIRTH CONTROL  · It is possible to become pregnant at any time after delivery and while breastfeeding.  · Plan to discuss a method of birth control with your physician at your follow up visit. visit.    DIET AND ELIMINATION  · Eating more fiber (bran cereal, fruits, and vegetables) and drinking plenty of fluids will help to avoid constipation.  · Urinary frequency after childbirth is normal.    POSTPARTUM BLUES  During the first few days after birth, you may experience a sense of the \"blues\" which may include impatience, irritability or even crying.  These feeling come and go quickly.  However, as many as 1 in 10 women experience emotional symptoms known as postpartum depression.    Postpartum depression:  May start as early as the second or third day after delivery or take several weeks or months to develop.  Symptoms of \"blues\" are present, but are more intense:  Crying spells; loss of appetite; feelings of hopelessness or loss of control; fear of touching the baby; over concern or no concern at all about the baby; little or no concern about your own appearance/caring for yourself; and/or inability to sleep or excessive sleeping.  Contact your physician if you are experiencing any of these symptoms.    Crisis Hotline:  · [place] Crisis Hotline:  2-875-XNDWTWA  Or 1-673.222.5431  · [place] Crisis Hotline:  1-747.458.1712  Or 143-416-4012    PREVENTING SHAKEN BABY:  If you are angry or stressed, PUT THE BABY IN THE CRIB, step away, take some deep breaths, and wait until you are calm to care for the baby.  DO NOT SHAKE THE BABY.  You are not alone, call a supporter for help.    · Crisis Call Center 24/7 crisis line 319-292-7111 or 1-223.590.1230  · You can also text them, text \"ANSWER\" to 515397    QUIT SMOKING/TOBACCO " USE:  I understand the use of any tobacco products increases my chance of suffering from future heart disease and could cause other illnesses which may shorten my life. Quitting the use of tobacco products is the single most important thing I can do to improve my health. For further information on smoking / tobacco cessation call a Toll Free Quit Line at 1-986.907.4655 (*National [employer] [place]) or 1-992.738.1875 (American Lung Association) or you can access the web based program at www.lungusa.org.    · [place] Tobacco Users Help Line:  (527) 779-5999       Toll Free: 1-312.827.8949  · Quit Tobacco Program [facility] (326)589-2407    DEPRESSION / SUICIDE RISK:  As you are discharged from this [facility], it is important to learn how to keep safe from harming yourself.    Recognize the warning signs:  · Abrupt changes in personality, positive or negative- including increase in energy   · Giving away possessions  · Change in eating patterns- significant weight changes-  positive or negative  · Change in sleeping patterns- unable to sleep or sleeping all the time   · Unwillingness or inability to communicate  · Depression  · Unusual sadness, discouragement and loneliness  · Talk of wanting to die  · Neglect of personal appearance   · Rebelliousness- reckless behavior  · Withdrawal from people/activities they love  · Confusion- inability to concentrate     If you or a loved one observes any of these behaviors or has concerns about self-harm, here's what you can do:  · Talk about it- your feelings and reasons for harming yourself  · Remove any means that you might use to hurt yourself (examples: pills, rope, extension cords, firearm)  · Get professional help from the community (Mental Health, Substance Abuse, psychological counseling)  · Do not be alone:Call your Safe Contact- someone whom you trust who will be there for you.  · Call your local CRISIS HOTLINE 463-7345 or  511.808.7326  · Call your local Children's Mobile Crisis Response Team [place] (842) 649-0569 or www.[employer] [place].Vdopia  · Call the toll free National Suicide Prevention Hotlines   · National Suicide Prevention Lifeline 571-222-TTYC (5846)  · National Hope Line Network 800-SUICIDE (931-4623)    DISCHARGE SURVEY:  Thank you for choosing [facility].  We hope we provided you with very good care.  You may be receiving a survey in the mail.  Please fill it out.  Your opinion is valuable to us.    ADDITIONAL EDUCATIONAL MATERIALS GIVEN TO PATIENT:        My signature on this form indicates that:  1.  I have reviewed and understand the above information  2.  My questions regarding this information have been answered to my satisfaction.  3.  I have formulated a plan with my discharge nurse to obtain my prescribed medication for home.         Discharge Medication Instructions:    Below are the medications your physician expects you to take upon discharge:    Review all your home medications and newly ordered medications with your doctor and/or pharmacist. Follow medication instructions as directed by your doctor and/or pharmacist.    Please keep your medication list with you and share with your physician.               Medication List      START taking these medications        Instructions    Morning Afternoon Evening Bedtime    ibuprofen 800 MG Tabs   Last time this was given:  800 mg on 8/3/2017  2:49 AM   Commonly known as:  MOTRIN        Take 1 Tab by mouth every 8 hours as needed (For cramping after delivery; do not give if patient is receiving ketorolac (Toradol)).   Dose:  800 mg                        oxycodone-acetaminophen 5-325 MG Tabs   Last time this was given:  1 Tab on 8/3/2017  2:49 AM   Commonly known as:  PERCOCET        Take 1 Tab by mouth every 6 hours as needed (for Moderate Pain (Pain Scale 4-6) after delivery).   Dose:  1 Tab                          CONTINUE taking these medications         Instructions    Morning Afternoon Evening Bedtime    nitrofurantoin monohydr macro 100 MG Caps   Commonly known as:  MACROBID        Take 100 mg by mouth 2 times a day.   Dose:  100 mg                        PRENATAL VITAMINS PO        Take  by mouth.                             Where to Get Your Medications      Information about where to get these medications is not yet available     ! Ask your nurse or doctor about these medications    - ibuprofen 800 MG Tabs  - oxycodone-acetaminophen 5-325 MG Tabs            Crisis Hotline:     [place] Crisis Hotline:  9-629-RJVZFCJ or 1-172.134.2030    [place] Crisis Hotline:    1-393.808.8645 or 455-865-0863        Disclaimer           _____________________________________                     __________       ________       Patient/Mother Signature or Legal                          Date                   Time

## 2017-08-01 NOTE — PROGRESS NOTES
"0916 Pt here for IOL , EDC  making her 40 weeks.EFM/TOCO applied. Pt states +FM, declines UC's, LOF or vaginal bleeding   0940 IV started labs drawn.   0950 SVE 2-3/70/-2  1002 Called to Dr. Ortega for status update. Orders received for IOL.   1024 Pitocin started.   1115 Pt states \"Contractions are stronger than mild cramping, but still not painful\" rated 4/10 on a pain scale of 0-10.   1256 Dr. Ortega to bedside for assessment SVE4/70/-3  1310 Pt complaining of pain, requesting epidural, bolusing for epidural.  1332 Sitting up for epidural placement  1340 Epidural placed by Dr. Tyler and [facility] anesthesiologist student,  1435 [place] placed  1549 Dr. Ortega to bedside for AROM and FSE placement. AROM Moderate Meconium. SVE 8-/90/-2  1552 FSE placed, not tracing placed second FSE, not tracing. Replaced FSE cord, not tracing. Dr. Ortega at the bedside, EFM applied audible fetal arrhythmia heard MD confirmed fetal arrhythmia.   1604 Decision to use EFM to determine baseline.  1751 Dr. Ortega to bedside for SVE complete/-1, orders received to start pushing.  182 Peanut ball in place to right side.  185 Peanut ball in place to left side.  190 Report given to YSL Morgan RN.  "

## 2017-08-01 NOTE — IP AVS SNAPSHOT
8/3/2017    Mayra Rojas  [address] [place] [address]    Dear Mayra:    [facility] wants to ensure your discharge home is safe and you or your loved ones have had all of your questions answered regarding your care after you leave the hospital.    Below is a list of resources and contact information should you have any questions regarding your hospital stay, follow-up instructions, or active medical symptoms.    Questions or Concerns Regarding… Contact   Medical Questions Related to Your Discharge  (7 days a week, 8am-5pm) Contact a Nurse Care Coordinator   [phone]   Medical Questions Not Related to Your Discharge  (24 hours a day / 7 days a week)  Contact the Nurse Health Line   [phone]    Medications or Discharge Instructions Refer to your discharge packet   or contact your [facility] Primary Care Provider   [phone]   Follow-up Appointment(s) Schedule your appointment via Results Scorecard   or contact Scheduling [phone]   Billing Review your statement via Results Scorecard  or contact Billing [phone]   Medical Records Review your records via Results Scorecard   or contact Medical Records [phone]     You may receive a telephone call within two days of discharge. This call is to make certain you understand your discharge instructions and have the opportunity to have any questions answered. You can also easily access your medical information, test results and upcoming appointments via the Results Scorecard free online health management tool. You can learn more and sign up at [employer]Results Scorecard. For assistance setting up your Results Scorecard account, please call [phone].    Once again, we want to ensure your discharge home is safe and that you have a clear understanding of any next steps in your care. If you have any questions or concerns, please do not hesitate to contact us, we are here for you. Thank you for choosing [facility] for your healthcare needs.    Sincerely,    Your [facility] Healthcare Team

## 2017-08-01 NOTE — PROGRESS NOTES
The patient is a very pleasant 22 year old primipara (para 1, with 1 previous vaginal delivery) who is today 40 weeks and 0 days gestation. She is admitted to day for induction of labor (elective induction of labor). I just checked her cervix and I found her cervix to be 4 cm dilated and 60 to 70 percent effaced and -3 station and soft and anterior. Her recent vaginal culture for group B strep came back negative for GBS. The estimated fetal weight is 3,700 grams. The pitocin has been started. Will continue to increase the pitocin.   Logan Ortega M.D.

## 2017-08-02 LAB
ERYTHROCYTE [DISTWIDTH] IN BLOOD BY AUTOMATED COUNT: 42.8 FL (ref 35.9–50)
HCT VFR BLD AUTO: 33.4 % (ref 37–47)
HGB BLD-MCNC: 10.7 G/DL (ref 12–16)
MCH RBC QN AUTO: 26.7 PG (ref 27–33)
MCHC RBC AUTO-ENTMCNC: 32 G/DL (ref 33.6–35)
MCV RBC AUTO: 83.3 FL (ref 81.4–97.8)
PLATELET # BLD AUTO: 240 K/UL (ref 164–446)
PMV BLD AUTO: 10 FL (ref 9–12.9)
RBC # BLD AUTO: 4.01 M/UL (ref 4.2–5.4)
WBC # BLD AUTO: 10.2 K/UL (ref 4.8–10.8)

## 2017-08-02 PROCEDURE — 36415 COLL VENOUS BLD VENIPUNCTURE: CPT

## 2017-08-02 PROCEDURE — 770002 HCHG ROOM/CARE - OB PRIVATE (112)

## 2017-08-02 PROCEDURE — 85027 COMPLETE CBC AUTOMATED: CPT

## 2017-08-02 PROCEDURE — A9270 NON-COVERED ITEM OR SERVICE: HCPCS | Performed by: SPECIALIST

## 2017-08-02 PROCEDURE — 700102 HCHG RX REV CODE 250 W/ 637 OVERRIDE(OP): Performed by: SPECIALIST

## 2017-08-02 RX ORDER — DOCUSATE SODIUM 100 MG/1
100 CAPSULE, LIQUID FILLED ORAL 2 TIMES DAILY PRN
Status: DISCONTINUED | OUTPATIENT
Start: 2017-08-02 | End: 2017-08-03 | Stop reason: HOSPADM

## 2017-08-02 RX ORDER — SODIUM CHLORIDE, SODIUM LACTATE, POTASSIUM CHLORIDE, CALCIUM CHLORIDE 600; 310; 30; 20 MG/100ML; MG/100ML; MG/100ML; MG/100ML
INJECTION, SOLUTION INTRAVENOUS PRN
Status: DISCONTINUED | OUTPATIENT
Start: 2017-08-02 | End: 2017-08-03 | Stop reason: HOSPADM

## 2017-08-02 RX ADMIN — IBUPROFEN 800 MG: 800 TABLET, FILM COATED ORAL at 13:22

## 2017-08-02 RX ADMIN — OXYCODONE HYDROCHLORIDE AND ACETAMINOPHEN 1 TABLET: 10; 325 TABLET ORAL at 13:22

## 2017-08-02 ASSESSMENT — PAIN SCALES - GENERAL
PAINLEVEL_OUTOF10: 9
PAINLEVEL_OUTOF10: 0
PAINLEVEL_OUTOF10: 0
PAINLEVEL_OUTOF10: 2
PAINLEVEL_OUTOF10: 0
PAINLEVEL_OUTOF10: 0

## 2017-08-02 NOTE — PROGRESS NOTES
Pt arrived via wheelchair with belongings to room S325. Report received from Gloria MCCLELLAND. Pt oriented to room, call light, emergency light, skylight & infant security. Assessment done. Fundus firm, lochia light. Vital signs stable. IV infusing 125 of pit in R FA. Pt states pain is tolerable, see MAR. Pt aware of dangers related to sleeping with infant, reviewed plan of care with pt & encouraged to call with needs or prior to ambulating. Call light in place. Will continue to monitor

## 2017-08-02 NOTE — OR SURGEON
1800 - hinds removed, 800 urine in place. Pt positioned in lithotomy and educated on pushing technique. RN at bedside continuously. Upon first pushing effort, pt noted to still have cervix all around fetal head and told to stop pushing for further assessment. SVE=7-8/70/-2. Dr. Ortega updated. New hinds placed. Pt turned to left side with peanut ball and pitocin increased.   1828 - toco adjusted

## 2017-08-02 NOTE — PROGRESS NOTES
Received report and assumed care of patient from MARII Spivey and MARIJA Abbott. Patient is resting comfortably with epidural. POC reviewed, questions answered, needs met at this time.    Dr. Ortega bedside, SVE /0 SVE complete  2240

## 2017-08-02 NOTE — PROGRESS NOTES
I just checked the patient's cervix. Her cervix is about 9 cm dilated. The station has now come down to about -1 to -2 station. The fetal heart tracing is reactive. I placed an IUPC. We will increase the Pitocin as needed. We will anticipate an obstetrical delivery.  Logan Ortega M.D.

## 2017-08-02 NOTE — PROGRESS NOTES
POST PARTUM DAY # 1  The patient complains of cramping pain.   She says that she is still having vaginal bleeding.   She says that she feels fine other wise and she says that she has no other problems or complaints.   She says that she would like to say until tomorrow.   The patient's vital signs are stable and she is afebrile.   She appears well developed and well nourished and relaxed and alert and comfortable and in no apparent distress.   Labs: pending.   We will plan on discharge home tomorrow.   Logan Ortega M.D.

## 2017-08-02 NOTE — CARE PLAN
Problem: Pain Management  Goal: Pain level will decrease to patient’s comfort goal  Outcome: PROGRESSING AS EXPECTED    Problem: Potential knowledge deficit related to lack of understanding of self and  care  Goal: Patient will verbalize understanding of self and infant care  Outcome: PROGRESSING AS EXPECTED

## 2017-08-02 NOTE — DELIVERY NOTE
DATE OF SERVICE:  2017    The patient is a very pleasant 22-year-old (on admission, para 1 with 1   previous vaginal delivery) who is today 40 weeks and 0 days' gestation and she   was admitted for induction of labor (elective induction of labor) and this   morning on admission, I checked her cervix and found her cervix to be about 4   cm dilated and about 60-70% effaced and -3 station and soft and anterior and   she was started on Pitocin and the Pitocin today was gradually increased   throughout the day.  Her recent vaginal culture for group B strep came back   negative for group B strep.  The estimated fetal weight on admission was 3,700   grams.  She received a labor epidural in the afternoon.  At about 3:30 in the   afternoon, I was contacted by the patient's nurse informed that the patient's   cervix was about 9 cm dilated and that there was a bulging bag of water and so   I came to labor and delivery and at that time (sometime around 3:30 p.m. to   4:00 p.m.), I performed rupture of membranes, which revealed amniotic fluid,   which was mildly stained with meconium and it seems that time that the   patient's cervix was about 9 cm dilated.  The station was somewhat high.  A   fetal scalp electrode was placed.  Pitocin was continued.  Later at several   minutes before 8:00 p.m., I checked her cervix, again and found her cervix to   be about 9 cm dilated, but the station was much lower.  I did at that time   placed an intrauterine pressure catheter.  Then, at about 10:30 p.m. (Tuesday,   2017), she went on to have a normal spontaneous vaginal delivery of a   live term female  with Apgar scores of 8 and 9 at 1 and 5 minutes   respectively and a  weight, which has not yet been measured, but which   I estimate to be about 3,400 grams.  Baby was delivered over an intact perineum   under continuous epidural anesthesia.  The placenta was simply delivered and   examined and found to be  complete.  Examination of the vulva and vagina   revealed that there were no vulvar or vaginal lacerations.  The uterus   appeared to be firm.  Estimated blood loss approximately 300 mL.  Addendum: Baby weighed 3,475 grams.        ____________________________________     HENRY IVERSON MD    MED / [facility]    DD:  08/01/2017 22:55:43  DT:  08/01/2017 23:10:26    D#:  [id number]  Job#:  [id number]

## 2017-08-02 NOTE — CONSULTS
Lactation note:    Met with mother because she is getting sore. Baby is <12 hrs old but eager to latch. Mom has hx of low milk supply with first baby and had to supplement. Pumped 2oz at the most in the first 8 weeks before stopping. Denies hx of thyroid problems or dx of PCOS, but didn't really feel any breast changes during pregnancy. Tried multiple times to get baby to latch but she usually latches with wide mouth then after a few sucks is noted to be on the nipple tip and is tongue sucking and removed. Each time mother shows that she is in pain. Showed mother how to do hand expression on right breast=no milk removed. Encouraged skin to skin, and hand expression and continue to attempt latches with wide mouth. Will need follow up. KRYSTIN Smith, IBCLC

## 2017-08-03 VITALS
WEIGHT: 293 LBS | HEIGHT: 65 IN | TEMPERATURE: 98 F | DIASTOLIC BLOOD PRESSURE: 76 MMHG | RESPIRATION RATE: 18 BRPM | BODY MASS INDEX: 48.82 KG/M2 | SYSTOLIC BLOOD PRESSURE: 111 MMHG | HEART RATE: 93 BPM | OXYGEN SATURATION: 97 %

## 2017-08-03 PROCEDURE — A9270 NON-COVERED ITEM OR SERVICE: HCPCS | Performed by: SPECIALIST

## 2017-08-03 PROCEDURE — 700102 HCHG RX REV CODE 250 W/ 637 OVERRIDE(OP): Performed by: SPECIALIST

## 2017-08-03 PROCEDURE — 700112 HCHG RX REV CODE 229: Performed by: SPECIALIST

## 2017-08-03 RX ORDER — OXYCODONE HYDROCHLORIDE AND ACETAMINOPHEN 5; 325 MG/1; MG/1
1 TABLET ORAL EVERY 6 HOURS PRN
Qty: 28 TAB | Refills: 0 | Status: SHIPPED | OUTPATIENT
Start: 2017-08-03 | End: 2018-09-05

## 2017-08-03 RX ORDER — IBUPROFEN 800 MG/1
800 TABLET ORAL EVERY 8 HOURS PRN
Qty: 30 TAB | Refills: 0 | Status: SHIPPED | OUTPATIENT
Start: 2017-08-03 | End: 2018-09-05

## 2017-08-03 RX ADMIN — DOCUSATE SODIUM 100 MG: 100 CAPSULE ORAL at 02:49

## 2017-08-03 RX ADMIN — IBUPROFEN 800 MG: 800 TABLET, FILM COATED ORAL at 02:49

## 2017-08-03 RX ADMIN — OXYCODONE HYDROCHLORIDE AND ACETAMINOPHEN 1 TABLET: 5; 325 TABLET ORAL at 02:49

## 2017-08-03 ASSESSMENT — PAIN SCALES - GENERAL
PAINLEVEL_OUTOF10: 6
PAINLEVEL_OUTOF10: 0

## 2017-08-03 NOTE — PROGRESS NOTES
Discharge instructions given to pt, questions answered, pt verbalized understanding.  Bands verified with MOB

## 2017-08-03 NOTE — PROGRESS NOTES
Assumed care of patient and assessment complete. Patient in stable condition, vitals WDL, fundus firm, lochia light. Discussed plan of care for the day, patient comfortable bottle feeding, and pumping. Call light in reach, bed in lowest position, encouraged to call if assistance is needed.

## 2017-08-03 NOTE — PROGRESS NOTES
POST PARTUM DAY # 2  The patient says that she feels fine and that she has no problems or complaints.   The patient's vital signs are stable and she is afebrile.   She appears well developed and well nourished and relaxed and alert and comfortable and in no apparent distress.   Will discharge home today.   Logan Ortega M.D.

## 2017-08-03 NOTE — PROGRESS NOTES
Patient assessment complete. Fundus is firm with minimal discharge. Pt ambulates and voids. Plan of care discussed with patient and all questions answered. Encouraged to call with call light.

## 2017-08-03 NOTE — DISCHARGE INSTRUCTIONS
POSTPARTUM DISCHARGE INSTRUCTIONS FOR MOM    YOB: 1995   Age: 22 y.o.               Admit Date: 8/1/2017     Discharge Date: 8/3/2017  Attending Doctor:  Logan Ortega M.D.                  Allergies:  Nkda    Discharged to home by car. Discharged via wheelchair, hospital escort: Yes.  Special equipment needed: Not Applicable  Belongings with: Personal  Be sure to schedule a follow-up appointment with your primary care doctor or any specialists as instructed.     Discharge Plan:   Diet Plan: Discussed  Confirmed Follow up Appointment: Patient to Call and Schedule Appointment  Confirmed Symptoms Management: Discussed  Medication Reconciliation Updated: Yes    REASONS TO CALL YOUR OBSTETRICIAN:  1.   Persistent fever or shaking chills (Temperature higher than 100.4)  2.   Heavy bleeding (soaking more than 1 pad per hour); Passing clots  3.   Foul odor from vagina  4.   Mastitis (Breast infection; breast pain, chills, fever, redness)  5.   Urinary pain, burning or frequency  6.   Episiotomy infection  7.   Severe depression longer than 24 hours    HAND WASHING  · Prior to handling the baby.  · Before breastfeeding or bottle feeding baby.  · After using the bathroom or changing the baby's diaper.      VAGINAL CARE  · Nothing inside vagina for 6 weeks: no sexual intercourse, tampons or douching.  · Bleeding may continue for 2-4 weeks.  Amount may vary.    · Call your physician for heavy bleeding which means soaking more than 1 pad per hour    BIRTH CONTROL  · It is possible to become pregnant at any time after delivery and while breastfeeding.  · Plan to discuss a method of birth control with your physician at your follow up visit. visit.    DIET AND ELIMINATION  · Eating more fiber (bran cereal, fruits, and vegetables) and drinking plenty of fluids will help to avoid constipation.  · Urinary frequency after childbirth is normal.    POSTPARTUM BLUES  During the first few days after birth, you may experience  "a sense of the \"blues\" which may include impatience, irritability or even crying.  These feeling come and go quickly.  However, as many as 1 in 10 women experience emotional symptoms known as postpartum depression.    Postpartum depression:  May start as early as the second or third day after delivery or take several weeks or months to develop.  Symptoms of \"blues\" are present, but are more intense:  Crying spells; loss of appetite; feelings of hopelessness or loss of control; fear of touching the baby; over concern or no concern at all about the baby; little or no concern about your own appearance/caring for yourself; and/or inability to sleep or excessive sleeping.  Contact your physician if you are experiencing any of these symptoms.    Crisis Hotline:  · [place] Crisis Hotline:  1-211-CYWUJMQ  Or 1-483.396.3549  · [place] Crisis Hotline:  1-972.663.7663  Or 382-673-5919    PREVENTING SHAKEN BABY:  If you are angry or stressed, PUT THE BABY IN THE CRIB, step away, take some deep breaths, and wait until you are calm to care for the baby.  DO NOT SHAKE THE BABY.  You are not alone, call a supporter for help.    · Crisis Call Center 24/7 crisis line 991-176-2010 or 1-796.721.9136  · You can also text them, text \"ANSWER\" to 744290    QUIT SMOKING/TOBACCO USE:  I understand the use of any tobacco products increases my chance of suffering from future heart disease and could cause other illnesses which may shorten my life. Quitting the use of tobacco products is the single most important thing I can do to improve my health. For further information on smoking / tobacco cessation call a Toll Free Quit Line at 1-447.577.8537 (*[employer]) or 1-669.281.6070 (American Lung Association) or you can access the web based program at www.lungusa.org.    · [place] Tobacco Users Help Line:  (544) 180-2795       Toll Free: 1-387.497.8750  · Quit Tobacco Program [facility] " (619) 602-5318    DEPRESSION / SUICIDE RISK:  As you are discharged from this [facility] facility, it is important to learn how to keep safe from harming yourself.    Recognize the warning signs:  · Abrupt changes in personality, positive or negative- including increase in energy   · Giving away possessions  · Change in eating patterns- significant weight changes-  positive or negative  · Change in sleeping patterns- unable to sleep or sleeping all the time   · Unwillingness or inability to communicate  · Depression  · Unusual sadness, discouragement and loneliness  · Talk of wanting to die  · Neglect of personal appearance   · Rebelliousness- reckless behavior  · Withdrawal from people/activities they love  · Confusion- inability to concentrate     If you or a loved one observes any of these behaviors or has concerns about self-harm, here's what you can do:  · Talk about it- your feelings and reasons for harming yourself  · Remove any means that you might use to hurt yourself (examples: pills, rope, extension cords, firearm)  · Get professional help from the community (Mental Health, Substance Abuse, psychological counseling)  · Do not be alone:Call your Safe Contact- someone whom you trust who will be there for you.  · Call your local CRISIS HOTLINE 891-4726 or 687-195-3353  · Call your local Children's Mobile Crisis Response Team [place] (985) 487-3462 or www.[employer]  · Call the toll free National Suicide Prevention Hotlines   · National Suicide Prevention Lifeline 411-561-QFEC (0418)  · National Hope Line Network 800-SUICIDE (162-9890)    DISCHARGE SURVEY:  Thank you for choosing [facility].  We hope we provided you with very good care.  You may be receiving a survey in the mail.  Please fill it out.  Your opinion is valuable to us.    ADDITIONAL EDUCATIONAL MATERIALS GIVEN TO PATIENT:        My signature on this form indicates that:  1.  I have reviewed and understand the above  information  2.  My questions regarding this information have been answered to my satisfaction.  3.  I have formulated a plan with my discharge nurse to obtain my prescribed medication for home.

## 2017-08-03 NOTE — CARE PLAN
Problem: Altered physiologic condition related to immediate post-delivery state and potential for bleeding/hemorrhage  Goal: Patient physiologically stable as evidenced by normal lochia, palpable uterine involution and vital signs within normal limits  Outcome: PROGRESSING AS EXPECTED  Fundus is firm and at the umbilicus. Lochia is light. Vital signs are within normal limits.     Problem: Alteration in comfort related to episiotomy, vaginal repair and/or after birth pains  Goal: Patient is able to ambulate, care for self and infant  Outcome: PROGRESSING AS EXPECTED  Patient reports pain of 0/10 and declines any pain medication at this time. Will continue to assess for pain.

## 2017-08-03 NOTE — PROGRESS NOTES
Discharged home in stable condition with all personal belongings, written information on self and infant care including prescriptions and follow up instructions. Patient expresses understanding and all questions answered. Patient doing well with infant and feels comfortable with her feeding plan. Discharged home at 1139 and escorted out in wheelchair by staff.

## 2017-08-03 NOTE — CONSULTS
Pt nipple on left is cracked along middle and top of nipple. Using lanolin after feedings. Encouraged to airdry breastmilk/colostrum prior to applying lanolin. Right areola bruised above nipple, at 11 o'clock position.   Baby latches readily to right nipple, signs of a deep latch shown to mother. Enc mother to be vigilant with positioning during feedings to avoid further damage. Mother shown how to flange baby's lips after baby latches if she notes a lip is tucked. Discussed feeding frequency and pacifier use.  Deep latch and swallows observed.  Enc to followup with her WIC peer counselor by early next week for latch check and nipple healing.

## 2018-07-16 ENCOUNTER — INITIAL PRENATAL (OUTPATIENT)
Dept: OBGYN | Facility: CLINIC | Age: 23
End: 2018-07-16
Payer: MEDICAID

## 2018-07-16 VITALS — BODY MASS INDEX: 56.75 KG/M2 | DIASTOLIC BLOOD PRESSURE: 74 MMHG | SYSTOLIC BLOOD PRESSURE: 122 MMHG | WEIGHT: 293 LBS

## 2018-07-16 DIAGNOSIS — N93.8 DUB (DYSFUNCTIONAL UTERINE BLEEDING): ICD-10-CM

## 2018-07-16 LAB — IN CLINIC OB SCAN: NORMAL

## 2018-07-16 PROCEDURE — 99213 OFFICE O/P EST LOW 20 MIN: CPT | Mod: 25 | Performed by: OBSTETRICS & GYNECOLOGY

## 2018-07-16 PROCEDURE — 76830 TRANSVAGINAL US NON-OB: CPT | Performed by: OBSTETRICS & GYNECOLOGY

## 2018-07-16 NOTE — PROGRESS NOTES
Pt here today for   LMP: 03/25/2018  WT: 341 lb  BP: 122/74  Pt states no complaints today  Good #  [phone]

## 2018-07-16 NOTE — PROGRESS NOTES
CC: Confirmation of Pregnancy    HPI: Pt is a 22 yo  lmp 3/25/18 who presents for evaluation of amenorrhea.  She has had no bleeding since her last menses.  A urine pregnancy test was positive.  She denies vaginal spotting or pain.  She notes nausea and vomiting.      ROS: negative for dizziness, SOB, chest pain, palpitations, dysuria, vaginal discharge.    Blood pressure 122/74, weight (!) 154.7 kg (341 lb), last menstrual period 2018, not currently breastfeeding.    GENERAL: Alert, in no apparent distress  PSYCHIATRIC: Appropriate affect, intact insight and judgement.  ABDOMEN: Soft, morbidly obese, nontender, nondistended.  No palpable masses. No hepatosplenomegaly.   No rebound or guarding.  No inguinal lymphadenopathy.  BACK: No CVA tenderness  EXTREMITIES: No edema, no calf tenderness.    GENITOURINARY:  Normal external genitalia, no lesions.  Normal urethral meatus, no masses or tenderness.  Normal bladder without fullness or masses.  Vagina well estrogenized, no vaginal discharge or lesions.  Cervix normal length, nontender.  Uterus 13 weeks, nontender.  Adnexa nontender, no masses.  Normal anus and perineum.      TRANSVAGINAL ULTRASOUND - performed and interpreted by me    Single gestational sac present.  Yolk sac not visualized    Santana intrauterine pregnancy with CRL measuring 6.99 cm, c/w 13+1/7 weeks EGA, positive fetal cardiac activity noted. EDC 19.     No fluid in cul de sac.    Ovaries and cervix appear grossly normal. Cervical length = 4.48 cm.      ASSESSMENT/PLAN:  1. DUB visit - Santana IUP at 13+1/7 wks.  Prenatal Vitamins.  F/U for NOB appointment 2 weeks.

## 2018-08-07 ENCOUNTER — HOSPITAL ENCOUNTER (OUTPATIENT)
Facility: MEDICAL CENTER | Age: 23
End: 2018-08-07
Attending: PHYSICIAN ASSISTANT
Payer: MEDICAID

## 2018-08-07 ENCOUNTER — INITIAL PRENATAL (OUTPATIENT)
Dept: OBGYN | Facility: CLINIC | Age: 23
End: 2018-08-07
Payer: MEDICAID

## 2018-08-07 VITALS — WEIGHT: 293 LBS | SYSTOLIC BLOOD PRESSURE: 114 MMHG | DIASTOLIC BLOOD PRESSURE: 64 MMHG | BODY MASS INDEX: 55.75 KG/M2

## 2018-08-07 DIAGNOSIS — Z34.83 ENCOUNTER FOR SUPERVISION OF OTHER NORMAL PREGNANCY IN THIRD TRIMESTER: ICD-10-CM

## 2018-08-07 LAB
APPEARANCE UR: CLEAR
BILIRUB UR STRIP-MCNC: NORMAL MG/DL
COLOR UR AUTO: NORMAL
GLUCOSE UR STRIP.AUTO-MCNC: NEGATIVE MG/DL
KETONES UR STRIP.AUTO-MCNC: NEGATIVE MG/DL
LEUKOCYTE ESTERASE UR QL STRIP.AUTO: NORMAL
NITRITE UR QL STRIP.AUTO: NEGATIVE
PH UR STRIP.AUTO: 5.5 [PH] (ref 5–8)
PROT UR QL STRIP: NEGATIVE MG/DL
RBC UR QL AUTO: NEGATIVE
SP GR UR STRIP.AUTO: 1.03
UROBILINOGEN UR STRIP-MCNC: NORMAL MG/DL

## 2018-08-07 PROCEDURE — 81002 URINALYSIS NONAUTO W/O SCOPE: CPT | Performed by: PHYSICIAN ASSISTANT

## 2018-08-07 PROCEDURE — 88175 CYTOPATH C/V AUTO FLUID REDO: CPT

## 2018-08-07 PROCEDURE — 87591 N.GONORRHOEAE DNA AMP PROB: CPT

## 2018-08-07 PROCEDURE — 59401 PR NEW OB VISIT: CPT | Performed by: PHYSICIAN ASSISTANT

## 2018-08-07 PROCEDURE — 87491 CHLMYD TRACH DNA AMP PROBE: CPT

## 2018-08-07 ASSESSMENT — ENCOUNTER SYMPTOMS
CARDIOVASCULAR NEGATIVE: 1
MUSCULOSKELETAL NEGATIVE: 1
EYES NEGATIVE: 1
NEUROLOGICAL NEGATIVE: 1
RESPIRATORY NEGATIVE: 1
GASTROINTESTINAL NEGATIVE: 1
CONSTITUTIONAL NEGATIVE: 1
PSYCHIATRIC NEGATIVE: 1

## 2018-08-07 NOTE — PROGRESS NOTES
Subjective:      Mayra Rojas is a 23 y.o. female who presents with New ob visit. Pt unsure of LMP but  done confirms KATLYN 18. First 2 pregnancies were  at term without complications, no GDM, PIH or delivery complications, then 2 SAB without complications. Pt denies PMHx, SHx. No tob, etoh or drug use. NKDA. Taking PNV only. Pt working but they arent allowing her to drink water or use restroom, so note given today. Pt currently denies cramping, bleeding or pain. +FM. Pt considering BTL if fetus is male gender only, otherwise will use BCP.    PE: See below. Size c/w dates. Unable to hear FHT by doppler due to maternal obesity, so BSUS done with single viable IUP, +FM, +cardiac activity at 145bpm    A/P: 1. IUP at 19w2d - PAP, GC/CT today. PNL, AFP slip given. US next avail. RTC 4 wks.  2. Maternal obesity      HPI    Review of Systems   Constitutional: Negative.    HENT: Negative.    Eyes: Negative.    Respiratory: Negative.    Cardiovascular: Negative.    Gastrointestinal: Negative.    Genitourinary: Negative.    Musculoskeletal: Negative.    Skin: Negative.    Neurological: Negative.    Endo/Heme/Allergies: Negative.    Psychiatric/Behavioral: Negative.           Objective:     /64   Wt (!) 152 kg (335 lb)   LMP 2018   BMI 55.75 kg/m²      Physical Exam   Constitutional: She appears well-developed and well-nourished.   HENT:   Head: Normocephalic and atraumatic.   Eyes: Pupils are equal, round, and reactive to light.   Neck: Normal range of motion. Neck supple. No thyromegaly present.   Cardiovascular: Normal rate, regular rhythm and normal heart sounds.    Pulmonary/Chest: Effort normal and breath sounds normal. No respiratory distress.   Abdominal: Soft. Bowel sounds are normal. She exhibits no distension. There is no tenderness.   Genitourinary: Vagina normal and uterus normal. Pelvic exam was performed with patient supine. There is no rash or tenderness on the right  labia. There is no rash or tenderness on the left labia. Uterus is not deviated, not enlarged (gravid, 19 wk size, difficult to assess due to maternal obesity) and not tender. Cervix exhibits no motion tenderness. Right adnexum displays no mass and no tenderness. Left adnexum displays no mass and no tenderness. No erythema in the vagina. No foreign body in the vagina. No signs of injury around the vagina. No vaginal discharge found.   Neurological: She is alert. She has normal reflexes.   Skin: Skin is warm and dry. No erythema.   Psychiatric: She has a normal mood and affect. Her behavior is normal. Thought content normal.   Vitals reviewed.              Assessment/Plan:     1. Encounter for supervision of other normal pregnancy in third trimester  - F/u 4 wk, US next avail  - POCT Urinalysis  - Prenatal MV-Min-Fe Fum-FA-DHA (PRENATAL 1 PO); Take  by mouth.  - PREG CNTR PRENATAL PN; Future  - US-OB 2ND 3RD TRI COMPLETE; Future  - AFP TETRA; Future  - THINPREP RFLX HPV ASCUS W/CTNG; Future

## 2018-08-07 NOTE — LETTER
Cystic Fibrosis Carrier Testing  Mayra Rojas    The following information is about a blood test that can be done to determine if you and/or your partner carry the gene for cystic fibrosis.    WHAT IS CYSTIC FIBROSIS?  · Cystic fibrosis (CF) is an inherited disease that affects more than 25,000 American children and young adults.  · Symptoms of CF vary but include lung congestion, pneumonia, diarrhea and poor growth.  Most people with CF have severe medical problems and some die at a young age.  Others have so few symptoms they are unaware they have CF.  · CF does not affect intelligence.  · Although there is no cure for CF at this time, scientists are making progress in improving treatment and in searching for a cure.  In the past many people with CF  at a very young age.  Today, many are living into their 20’s and 30’s.    IS THERE A CHANCE MY BABY COULD HAVE CYSTIC FIBROSIS?  · You can have a child with CF even if there is no history in your family (see chart below).  · CF testing can help determine if you are a carrier and at risk to have a child with CF.  Note: if both parents are carriers, there is a 1 in 4 (25%) chance with each pregnancy that they will have a child with CF.  · Carriers have one normal CF gene and one altered CF gene.  · People with CF have two altered CF genes.  · Most people have two normal copies of the CF gene.    Approximate risk that a couple with no family history of cystic fibrosis will have a child with cystic fibrosis:    Ethnic background / Risk     couple:  1 in 2,500   couple:  1 in 15,000            couple:  1 in 8,000     American couple:  1 in 32,000     WHAT TESTING IS AVAILABLE?  · There is a blood test that can be done to find out if you or your partner is a carrier.  · It is important to understand that CF carrier testing does not detect all CF carriers.  · If the test shows that you are both CF carriers, you  unborn baby can be tested to find out if the baby has CF.    HOW MUCH DOES IT COST TO HAVE CYSTIC FIBROSIS CARRIER TESTING?  · Cost and insurance coverage for CF carrier testing vary depending upon the laboratory used and your insurance policy.  · The average cost for CF carrier testing is $300 per person.  · Your genetic counselor can provide you with more information about cystic fibrosis carrier testing.    _____  Yes, I am interested in discussing carrier testing with a genetic counselor.    _____  No, I am not interested in CF carrier testing or in receiving more information about CF carrier testing.      Client signature: ________________________________________  8/7/2018

## 2018-08-07 NOTE — PROGRESS NOTES
NOB today and pap smear   LMP: unsure, some time in March 2018  Patient is taking PNV   Last pap: per patient she had one last year, WNL. She has asked we do another one today.   Chaperone offered and patient declined.  Phone # [phone]  Pharmacy confirmed  C/o: needs dr christian to be able to drink water and use the restroom often

## 2018-08-07 NOTE — LETTER
August 7, 2018       Patient: Mayra Rojas   YOB: 1995   Date of Visit: 8/7/2018         To Whom It May Concern:    It is my medical opinion that Mayra Rojas needs to use restroom whenever she needs, also please give her frequent sitting breaks - 10 minutes minimum every 2 hours while at work. She also needs to be able to drink as much water as possible while at work. Thank you.     If you have any questions or concerns, please don't hesitate to call [phone]          Sincerely,          DAVIE Juarez.

## 2018-08-08 DIAGNOSIS — Z34.83 ENCOUNTER FOR SUPERVISION OF OTHER NORMAL PREGNANCY IN THIRD TRIMESTER: ICD-10-CM

## 2018-08-09 LAB
C TRACH DNA GENITAL QL NAA+PROBE: NEGATIVE
CYTOLOGY REG CYTOL: NORMAL
N GONORRHOEA DNA GENITAL QL NAA+PROBE: NEGATIVE
SPECIMEN SOURCE: NORMAL

## 2018-08-10 ENCOUNTER — TELEPHONE (OUTPATIENT)
Dept: OBGYN | Facility: CLINIC | Age: 23
End: 2018-08-10

## 2018-08-29 ENCOUNTER — HOSPITAL ENCOUNTER (OUTPATIENT)
Dept: LAB | Facility: MEDICAL CENTER | Age: 23
End: 2018-08-29
Attending: PHYSICIAN ASSISTANT
Payer: MEDICAID

## 2018-08-29 DIAGNOSIS — Z34.83 ENCOUNTER FOR SUPERVISION OF OTHER NORMAL PREGNANCY IN THIRD TRIMESTER: ICD-10-CM

## 2018-08-29 LAB
ABO GROUP BLD: NORMAL
APPEARANCE UR: ABNORMAL
BACTERIA #/AREA URNS HPF: ABNORMAL /HPF
BASOPHILS # BLD AUTO: 0.3 % (ref 0–1.8)
BASOPHILS # BLD: 0.03 K/UL (ref 0–0.12)
BILIRUB UR QL STRIP.AUTO: NEGATIVE
BLD GP AB SCN SERPL QL: NORMAL
COLOR UR: YELLOW
EOSINOPHIL # BLD AUTO: 0.13 K/UL (ref 0–0.51)
EOSINOPHIL NFR BLD: 1.5 % (ref 0–6.9)
EPI CELLS #/AREA URNS HPF: ABNORMAL /HPF
ERYTHROCYTE [DISTWIDTH] IN BLOOD BY AUTOMATED COUNT: 45.1 FL (ref 35.9–50)
GLUCOSE UR STRIP.AUTO-MCNC: NEGATIVE MG/DL
HBV SURFACE AG SER QL: NEGATIVE
HCT VFR BLD AUTO: 38.8 % (ref 37–47)
HGB BLD-MCNC: 12.9 G/DL (ref 12–16)
HYALINE CASTS #/AREA URNS LPF: ABNORMAL /LPF
IMM GRANULOCYTES # BLD AUTO: 0.03 K/UL (ref 0–0.11)
IMM GRANULOCYTES NFR BLD AUTO: 0.3 % (ref 0–0.9)
KETONES UR STRIP.AUTO-MCNC: NEGATIVE MG/DL
LEUKOCYTE ESTERASE UR QL STRIP.AUTO: ABNORMAL
LYMPHOCYTES # BLD AUTO: 1.84 K/UL (ref 1–4.8)
LYMPHOCYTES NFR BLD: 20.6 % (ref 22–41)
MCH RBC QN AUTO: 28.4 PG (ref 27–33)
MCHC RBC AUTO-ENTMCNC: 33.2 G/DL (ref 33.6–35)
MCV RBC AUTO: 85.3 FL (ref 81.4–97.8)
MICRO URNS: ABNORMAL
MONOCYTES # BLD AUTO: 0.36 K/UL (ref 0–0.85)
MONOCYTES NFR BLD AUTO: 4 % (ref 0–13.4)
NEUTROPHILS # BLD AUTO: 6.54 K/UL (ref 2–7.15)
NEUTROPHILS NFR BLD: 73.3 % (ref 44–72)
NITRITE UR QL STRIP.AUTO: NEGATIVE
NRBC # BLD AUTO: 0 K/UL
NRBC BLD-RTO: 0 /100 WBC
PH UR STRIP.AUTO: 6 [PH]
PLATELET # BLD AUTO: 244 K/UL (ref 164–446)
PMV BLD AUTO: 10.1 FL (ref 9–12.9)
PROT UR QL STRIP: NEGATIVE MG/DL
RBC # BLD AUTO: 4.55 M/UL (ref 4.2–5.4)
RBC # URNS HPF: ABNORMAL /HPF
RBC UR QL AUTO: NEGATIVE
RH BLD: NORMAL
RUBV AB SER QL: 14.7 IU/ML
SP GR UR STRIP.AUTO: 1.02
TREPONEMA PALLIDUM IGG+IGM AB [PRESENCE] IN SERUM OR PLASMA BY IMMUNOASSAY: NON REACTIVE
UROBILINOGEN UR STRIP.AUTO-MCNC: 1 MG/DL
WBC # BLD AUTO: 8.9 K/UL (ref 4.8–10.8)
WBC #/AREA URNS HPF: ABNORMAL /HPF

## 2018-08-29 PROCEDURE — 85025 COMPLETE CBC W/AUTO DIFF WBC: CPT

## 2018-08-29 PROCEDURE — 81001 URINALYSIS AUTO W/SCOPE: CPT

## 2018-08-29 PROCEDURE — 86780 TREPONEMA PALLIDUM: CPT

## 2018-08-29 PROCEDURE — 86850 RBC ANTIBODY SCREEN: CPT

## 2018-08-29 PROCEDURE — 86900 BLOOD TYPING SEROLOGIC ABO: CPT

## 2018-08-29 PROCEDURE — 87389 HIV-1 AG W/HIV-1&-2 AB AG IA: CPT

## 2018-08-29 PROCEDURE — 86901 BLOOD TYPING SEROLOGIC RH(D): CPT

## 2018-08-29 PROCEDURE — 87340 HEPATITIS B SURFACE AG IA: CPT

## 2018-08-29 PROCEDURE — 81511 FTL CGEN ABNOR FOUR ANAL: CPT

## 2018-08-29 PROCEDURE — 86762 RUBELLA ANTIBODY: CPT

## 2018-08-29 PROCEDURE — 36415 COLL VENOUS BLD VENIPUNCTURE: CPT

## 2018-08-30 LAB — HIV 1+2 AB+HIV1 P24 AG SERPL QL IA: NON REACTIVE

## 2018-09-02 LAB
# FETUSES US: NORMAL
AFP MOM SERPL: 0.77
AFP SERPL-MCNC: 38 NG/ML
AGE - REPORTED: 25.6 YR
CURRENT SMOKER: NO
FAMILY MEMBER DISEASES HX: NO
GA METHOD: NORMAL
GA: NORMAL WK
HCG MOM SERPL: 0.36
HCG SERPL-ACNC: 4664 IU/L
HX OF HEREDITARY DISORDERS: NO
IDDM PATIENT QL: NO
INHIBIN A MOM SERPL: 1.01
INHIBIN A SERPL-MCNC: 118 PG/ML
INTEGRATED SCN PATIENT-IMP: NORMAL
PATHOLOGY STUDY: NORMAL
SPECIMEN DRAWN SERPL: NORMAL
U ESTRIOL MOM SERPL: 0.93
U ESTRIOL SERPL-MCNC: 2.12 NG/ML

## 2018-09-05 ENCOUNTER — ROUTINE PRENATAL (OUTPATIENT)
Dept: OBGYN | Facility: CLINIC | Age: 23
End: 2018-09-05

## 2018-09-05 ENCOUNTER — APPOINTMENT (OUTPATIENT)
Dept: RADIOLOGY | Facility: IMAGING CENTER | Age: 23
End: 2018-09-05
Attending: PHYSICIAN ASSISTANT
Payer: MEDICAID

## 2018-09-05 VITALS — SYSTOLIC BLOOD PRESSURE: 124 MMHG | WEIGHT: 293 LBS | BODY MASS INDEX: 55.75 KG/M2 | DIASTOLIC BLOOD PRESSURE: 72 MMHG

## 2018-09-05 DIAGNOSIS — Z34.82 ENCOUNTER FOR SUPERVISION OF OTHER NORMAL PREGNANCY IN SECOND TRIMESTER: Primary | ICD-10-CM

## 2018-09-05 DIAGNOSIS — Z34.83 ENCOUNTER FOR SUPERVISION OF OTHER NORMAL PREGNANCY IN THIRD TRIMESTER: ICD-10-CM

## 2018-09-05 PROCEDURE — 90040 PR PRENATAL FOLLOW UP: CPT | Performed by: NURSE PRACTITIONER

## 2018-09-05 PROCEDURE — 76805 OB US >/= 14 WKS SNGL FETUS: CPT | Performed by: OBSTETRICS & GYNECOLOGY

## 2018-09-05 NOTE — PROGRESS NOTES
OB f/u. + fetal movement.  No VB, LOF or UC's.  Wt: 335lb      BP: 124/72  Good phone # [phone]  Preferred pharmacy confirmed.  US done today  PNP, AFP done

## 2018-09-05 NOTE — PROGRESS NOTES
S:  Pt is  at 20w3d here for routine OB follow up.  No c/o today.  Reports good FM.  Denies VB, LOF, RUCs, or vaginal DC.     O:  Please see above vitals.        FHTs: 145 on BSUS.        Fundal ht: 20 cm.        AFP: wnl -- reviewed w pt.    Complete OB US  Pending    A:  IUP 20w3d  Patient Active Problem List    Diagnosis Date Noted   • Supervision of normal pregnancy 2015   • Morbid obesity with BMI of 50.0-59.9, adult ([facility]) 2015        P:  1.  Reviewed labs w pt.          2.  Questions answered.          3.  Encouraged adequate water intake        4.  F/u 4 wks.

## 2018-09-05 NOTE — PATIENT INSTRUCTIONS
P:  1.  Reviewed labs w pt.          2.  Questions answered.          3.  Encouraged adequate water intake        4.  F/u 4 wks.

## 2018-09-10 ENCOUNTER — TELEPHONE (OUTPATIENT)
Dept: OBGYN | Facility: CLINIC | Age: 23
End: 2018-09-10

## 2018-09-10 DIAGNOSIS — M79.89 PAIN AND SWELLING OF LOWER EXTREMITY, RIGHT: ICD-10-CM

## 2018-09-10 DIAGNOSIS — M79.604 PAIN AND SWELLING OF LOWER EXTREMITY, RIGHT: ICD-10-CM

## 2018-09-10 NOTE — TELEPHONE ENCOUNTER
Pt c/o has a swollen leg (Rigth leg) now getting rod and less pain full, wants to see if she needs to go to ER or we can see her.   Consulted w/[facility]. MD. Advised to go to have an STAT u/s to rile out for blood clotting.  Pt instructed to go for U/S, but if symptoms get worse to go to ER to be seen.  Pt verbalized understanding.

## 2018-09-12 ENCOUNTER — TELEPHONE (OUTPATIENT)
Dept: OBGYN | Facility: CLINIC | Age: 23
End: 2018-09-12

## 2018-09-12 NOTE — TELEPHONE ENCOUNTER
Called pt to inform her that her U/S that was done on 9/5/18 the tech could not see everything and the doctor has put in order to have her do another U/S in 2 weeks. Pt verbalized understanding and had no further questions or concerns. Pt transferred to Bernadette RIVAS to schedule U/S appt pt also instructed to keep follow up appt with [facility] on 10/4/18.

## 2018-09-13 ENCOUNTER — TELEPHONE (OUTPATIENT)
Dept: OBGYN | Facility: CLINIC | Age: 23
End: 2018-09-13

## 2018-09-13 DIAGNOSIS — Z34.82 ENCOUNTER FOR SUPERVISION OF OTHER NORMAL PREGNANCY, SECOND TRIMESTER: Primary | ICD-10-CM

## 2018-09-13 NOTE — TELEPHONE ENCOUNTER
----- Message from Sera Gordon M.D. sent at 9/7/2018  1:09 PM PDT -----  Please call patient and inform her that I have placed another order for US to be done in 2 weeks, as they could not see everything on the first exam.    9/13/18 0940 Pt called, returning a call in regard's to this. I notified patient of U/S results and 's recommendations and advised patient follow-up U/S scheduled for 9/24/18 at 11 a.m., check in at 10:45 a.m., pt voiced understanding. Also, I notified patient she needs to get the U/S previously ordered by  (to R/O blood clot) provided patient with phone number to call and schedule, pt verbalized understanding and will comply. Pt had no further questions or concerns.

## 2018-09-17 ENCOUNTER — HOSPITAL ENCOUNTER (OUTPATIENT)
Dept: RADIOLOGY | Facility: MEDICAL CENTER | Age: 23
End: 2018-09-17
Attending: OBSTETRICS & GYNECOLOGY
Payer: MEDICAID

## 2018-09-17 DIAGNOSIS — M79.604 PAIN AND SWELLING OF LOWER EXTREMITY, RIGHT: ICD-10-CM

## 2018-09-17 DIAGNOSIS — M79.89 PAIN AND SWELLING OF LOWER EXTREMITY, RIGHT: ICD-10-CM

## 2018-09-17 PROCEDURE — 93971 EXTREMITY STUDY: CPT | Mod: RT

## 2018-09-18 ENCOUNTER — TELEPHONE (OUTPATIENT)
Dept: OBGYN | Facility: CLINIC | Age: 23
End: 2018-09-18

## 2018-09-18 NOTE — TELEPHONE ENCOUNTER
----- Message from Janneth Prasad M.D. sent at 9/18/2018  8:33 AM PDT -----  Please inform patient the ultrasound of her leg was normal    Pt notified, has not questions.

## 2018-09-24 ENCOUNTER — APPOINTMENT (OUTPATIENT)
Dept: RADIOLOGY | Facility: IMAGING CENTER | Age: 23
End: 2018-09-24
Attending: NURSE PRACTITIONER
Payer: MEDICAID

## 2018-09-24 DIAGNOSIS — Z34.82 ENCOUNTER FOR SUPERVISION OF OTHER NORMAL PREGNANCY, SECOND TRIMESTER: ICD-10-CM

## 2018-09-24 PROCEDURE — 76816 OB US FOLLOW-UP PER FETUS: CPT | Mod: TC | Performed by: NURSE PRACTITIONER

## 2018-10-04 ENCOUNTER — ROUTINE PRENATAL (OUTPATIENT)
Dept: OBGYN | Facility: CLINIC | Age: 23
End: 2018-10-04
Payer: MEDICAID

## 2018-10-04 VITALS — BODY MASS INDEX: 55.58 KG/M2 | SYSTOLIC BLOOD PRESSURE: 124 MMHG | DIASTOLIC BLOOD PRESSURE: 64 MMHG | WEIGHT: 293 LBS

## 2018-10-04 DIAGNOSIS — Z34.82 ENCOUNTER FOR SUPERVISION OF OTHER NORMAL PREGNANCY IN SECOND TRIMESTER: ICD-10-CM

## 2018-10-04 PROCEDURE — 90686 IIV4 VACC NO PRSV 0.5 ML IM: CPT | Performed by: PHYSICIAN ASSISTANT

## 2018-10-04 PROCEDURE — 90040 PR PRENATAL FOLLOW UP: CPT | Performed by: PHYSICIAN ASSISTANT

## 2018-10-04 PROCEDURE — 90471 IMMUNIZATION ADMIN: CPT | Performed by: PHYSICIAN ASSISTANT

## 2018-10-04 NOTE — PROGRESS NOTES
Pt. Here for OB/FU today. Reports Good FM.   Good # [phone]  Pt states no complaints.   Pharmacy verified.   Flu vaccine given today, left deltoid. Screening checklist reviewed with pt. Verified by Anila CASTANO   Pt given 1 HR GTT and RPR lab slip today along with instructions.

## 2018-10-04 NOTE — PROGRESS NOTES
Pt has no complaints with cramping, bleeding or pain. +FM. US results wnl - pt notified of results. Flu vaccine given today. Pt states she is adamant she wants BTL, so will sign consent next visit, and will get PP if needed. 1hr GTT, H/H, RPR slip given today with instructions. RTC 4 wk or sooner prn.

## 2018-10-18 ENCOUNTER — ROUTINE PRENATAL (OUTPATIENT)
Dept: OBGYN | Facility: CLINIC | Age: 23
End: 2018-10-18
Payer: MEDICAID

## 2018-10-18 ENCOUNTER — HOSPITAL ENCOUNTER (OUTPATIENT)
Facility: MEDICAL CENTER | Age: 23
End: 2018-10-18
Attending: NURSE PRACTITIONER
Payer: MEDICAID

## 2018-10-18 VITALS — DIASTOLIC BLOOD PRESSURE: 80 MMHG | BODY MASS INDEX: 55.58 KG/M2 | WEIGHT: 293 LBS | SYSTOLIC BLOOD PRESSURE: 125 MMHG

## 2018-10-18 DIAGNOSIS — Z3A.26 26 WEEKS GESTATION OF PREGNANCY: ICD-10-CM

## 2018-10-18 DIAGNOSIS — Z3A.26 26 WEEKS GESTATION OF PREGNANCY: Primary | ICD-10-CM

## 2018-10-18 LAB — FIBRONECTIN FETAL SPEC QL: NEGATIVE

## 2018-10-18 PROCEDURE — 82731 ASSAY OF FETAL FIBRONECTIN: CPT

## 2018-10-18 PROCEDURE — 90040 PR PRENATAL FOLLOW UP: CPT | Performed by: NURSE PRACTITIONER

## 2018-10-18 NOTE — PROGRESS NOTES
Ob visit @ 26w4d.Pt states she was seen on L+D (STS) pain and cramping.Per report she was told she was 1-2cm, unable to do FFN due to recent intercourse. Pt denies bleeding or leaking fluid but admits to continued pain and cramping down low. No hx of PTL. Order for 26 week labs reprinted and given to pt.t

## 2018-10-18 NOTE — PROGRESS NOTES
S:  Pt is  at 26w4d for routine OB follow up.  Pt is here for a fit-in. Was seen at [facility] on 10/13 for pain and cramping following intercourse. She was told that her outer oss was open. She continues to have cramping. Reports good FM.  Denies VB, LOF, RUCs or vaginal DC.    O:  Please see above vitals.        FHTs: 152        Fundal ht: 26 cm.        S=D        FFN obtained and sent        VE: external os: 1-2 cm open, inner os closed/th/floating .    A:  IUP at 26w4d  Patient Active Problem List    Diagnosis Date Noted   • Supervision of normal pregnancy 2015   • Morbid obesity with BMI of 50.0-59.9, adult ([facility]) 2015        P:  1.  Desires BTL, consents signed.          2.  Questions answered.          3.  Encourage adequate water intake.        4.  1hr GTT, syphilis and H/H not done yet, will have drawn before next visit        5.   labor precautions reviewed.         6.  F/u on  for MEREDITH.

## 2018-11-01 ENCOUNTER — ROUTINE PRENATAL (OUTPATIENT)
Dept: OBGYN | Facility: CLINIC | Age: 23
End: 2018-11-01
Payer: MEDICAID

## 2018-11-01 ENCOUNTER — HOSPITAL ENCOUNTER (OUTPATIENT)
Dept: LAB | Facility: MEDICAL CENTER | Age: 23
End: 2018-11-01
Attending: PHYSICIAN ASSISTANT
Payer: MEDICAID

## 2018-11-01 VITALS — DIASTOLIC BLOOD PRESSURE: 78 MMHG | BODY MASS INDEX: 56.91 KG/M2 | WEIGHT: 293 LBS | SYSTOLIC BLOOD PRESSURE: 110 MMHG

## 2018-11-01 DIAGNOSIS — Z34.82 ENCOUNTER FOR SUPERVISION OF OTHER NORMAL PREGNANCY IN SECOND TRIMESTER: ICD-10-CM

## 2018-11-01 DIAGNOSIS — Z34.83 ENCOUNTER FOR SUPERVISION OF OTHER NORMAL PREGNANCY IN THIRD TRIMESTER: Primary | ICD-10-CM

## 2018-11-01 LAB
GLUCOSE 1H P 50 G GLC PO SERPL-MCNC: 114 MG/DL (ref 70–139)
HCT VFR BLD AUTO: 40 % (ref 37–47)
HGB BLD-MCNC: 12.7 G/DL (ref 12–16)
TREPONEMA PALLIDUM IGG+IGM AB [PRESENCE] IN SERUM OR PLASMA BY IMMUNOASSAY: NON REACTIVE

## 2018-11-01 PROCEDURE — 90040 PR PRENATAL FOLLOW UP: CPT | Performed by: NURSE PRACTITIONER

## 2018-11-01 PROCEDURE — 85018 HEMOGLOBIN: CPT

## 2018-11-01 PROCEDURE — 85014 HEMATOCRIT: CPT

## 2018-11-01 PROCEDURE — 82950 GLUCOSE TEST: CPT

## 2018-11-01 PROCEDURE — 86780 TREPONEMA PALLIDUM: CPT

## 2018-11-01 PROCEDURE — 90715 TDAP VACCINE 7 YRS/> IM: CPT | Performed by: NURSE PRACTITIONER

## 2018-11-01 PROCEDURE — 90471 IMMUNIZATION ADMIN: CPT | Performed by: NURSE PRACTITIONER

## 2018-11-01 PROCEDURE — 36415 COLL VENOUS BLD VENIPUNCTURE: CPT

## 2018-11-01 NOTE — LETTER
"Count Your Baby's Movements  Another step to a healthy delivery    Mayra Rojas,             Dept: [phone]    How Many Weeks Pregnant? 28w4d    Date to Begin Countin18              How to use this chart    One way for your physician to keep track of your baby's health is by knowing how often the baby moves (or \"kicks\") in your womb.  You can help your physician to do this by using this chart every day.    Every day, you should see how many hours it takes for your baby to move 10 times.  Start in the morning, as soon as you get up.    · First, write down the time your baby moves until you get to 10.  · Check off one box every time your baby moves until you get to 10.  · Write down the time you finished counting in the last column.  · Total how long it took to count up all 10 movements.  · Finally, fill in the box that shows how long this took.  After counting 10 movements, you no longer have to count any more that day.  The next morning, just start counting again as soon as you get up.    What should you call a \"movement\"?  It is hard to say, because it will feel different from one mother to another and from one pregnancy to the next.  The important thing is that you count the movements the same way throughout your pregnancy.  If you have more questions, you should ask your physician.    Count carefully every day!  SAMPLE:  Week 28    How many hours did it take to feel 10 movements?       Start  Time     1     2     3     4     5     6     7     8     9     10   Finish Time   Mon 8:20 ·  ·  ·  ·  ·  ·  ·  ·  ·  ·  11:40                  Sat               Sun                 IMPORTANT: You should contact your physician if it takes more than two hours for you to feel 10 movements.  Each morning, write down the time and start to count the movements of your baby.  Keep track by checking off one box every time you feel one movement.  When you have " "felt 10 \"kicks\", write down the time you finished counting in the last column.  Then fill in the   box (over the check joe) for the number of hours it took.  Be sure to read the complete instructions on the previous page.            "

## 2018-11-01 NOTE — PROGRESS NOTES
Pt here today for OB follow up  Pt states lot of pain in pelvic area  Reports +FM  Good # [phone]  Pharmacy Confirmed.  Chaperone offered and none needed.   KAYLEIGH sheet instructions given  Pt desires Tdap  BTL signed  Labs done this morning   Tdap vaccine given. Right Deltoid. VIS given and screening check list reviewed with pt. Vaccine verified by Anila BAUTISTA

## 2018-11-01 NOTE — PROGRESS NOTES
S) Pt is a 23 y.o.   at 28w4d  gestation. Routine prenatal care today. Pt did 3rd trimester labs this morning, no results yet.  Discussed her SVE with Mayra last visit and reassurance provided.  Discussed no need to be on restricted activity or refrain from intercourse as she was told by provider at [facility] on 10/18/18.  She continues to have some round ligament pain.  Also discussed with pt wt gain of 8 lbs in 2 weeks.  She states she has not been exercising (walking) as she did prior to her visit at [facility].  She also states she has been drinking more soda in the last two weeks.  Discussion regarding resumption of physical activity at least 30 minutes/day and cutting out the soda.    Fetal movement Normal  Cramping no  VB no  LOF no   Denies dysuria. Generally feels well today. Good self-care activities identified. Denies headaches, swelling, visual changes, or epigastric pain .     O) Blood pressure 110/78, weight (!) 155.1 kg (342 lb), last menstrual period 2018, not currently breastfeeding.        Labs: done today       PNL: normal       GCT: done today       AFP: normal       GBS: N/A       Pertinent ultrasound - 18 survey WNL with exception of not being able to see some anatomy, EVELYN 17.99; 18 heart and diaphragm seen, EVELYN 15.39    A) IUP at 28w4d       S>D         Patient Active Problem List    Diagnosis Date Noted   • Supervision of normal pregnancy 2015   • Morbid obesity with BMI of 50.0-59.9, adult ([facility]) 2015          SVE: deferred         TDAP: yes       FLU: yes        BTL: yes       : no       C/S Consent: no       IOL or C/S scheduled: no       LAST PAP: 18 negative         P) s/s ptl vs general discomforts. May resume normal activity.   Fetal movements reviewed-kick count given today.  General ed and anticipatory guidance. Nutrition/exercise/vitamin. Encouraged 30 minutes exercise/day and cutting back on sweets/carbs.  Discussed abdominal support band.    Plans breast Plans pp contraception- unsure   Monitor [facility] closely as S>D today.  Discussed will call if any labs abnormal.   RTC 2 weeks or PRN.

## 2018-11-15 ENCOUNTER — ROUTINE PRENATAL (OUTPATIENT)
Dept: OBGYN | Facility: CLINIC | Age: 23
End: 2018-11-15
Payer: MEDICAID

## 2018-11-15 VITALS — BODY MASS INDEX: 56.08 KG/M2 | SYSTOLIC BLOOD PRESSURE: 120 MMHG | WEIGHT: 293 LBS | DIASTOLIC BLOOD PRESSURE: 84 MMHG

## 2018-11-15 DIAGNOSIS — Z34.83 ENCOUNTER FOR SUPERVISION OF OTHER NORMAL PREGNANCY IN THIRD TRIMESTER: Primary | ICD-10-CM

## 2018-11-15 PROCEDURE — 90040 PR PRENATAL FOLLOW UP: CPT | Performed by: NURSE PRACTITIONER

## 2018-11-15 NOTE — PROGRESS NOTES
S) Pt is a 23 y.o.   at 30w4d  gestation. Routine prenatal care today. Pt has a cold.  Pt has been starting to walk more this last week.  Positive reinforcement provided as pt was maintaining bedrest last visit as directed by [facility].  She had an 8lb wt gain at her last visit, over a 2 week period.    Fetal movement Normal  Cramping no  VB no  LOF no   Denies dysuria. Generally feels well today. Good self-care activities identified. Denies headaches, swelling, visual changes, or epigastric pain .     O) Weight (!) 152.9 kg (337 lb), last menstrual period 2018, not currently breastfeeding.        Labs: WNL       PNL: WNL       GCT: 114       AFP: normal       GBS: N/A       Pertinent ultrasound - 18 survey WNL with exception of not being able to see some anatomy, EVELYN 17.99; 18 heart and diaphragm seen, EVELYN 15.39           A) IUP at 30w4d       S=D         Patient Active Problem List    Diagnosis Date Noted   • Supervision of normal pregnancy 2015   • Morbid obesity with BMI of 50.0-59.9, adult ([facility]) 2015          SVE: deferred          TDAP: yes       FLU: yes        BTL: yes       : no       C/S Consent: no       IOL or C/S scheduled: no       LAST PAP:          P) s/s ptl vs general discomforts.   Fetal movements reviewed. General ed and anticipatory guidance. Nutrition/exercise/vitamin. Plans breast Plans pp contraception- unsure -sheet given.  Given cold remedy sheet.  Encouraged continued walking regimen.  Discussed normalcy of her 3rd trimester labs.  RTC 2 weeks or PRN

## 2018-11-15 NOTE — PROGRESS NOTES
OB f/u. + fetal movement.  No VB, LOF or UC's.  Wt:337lb       BP: 120/84  Good phone # [phone]  Preferred pharmacy confirmed.

## 2018-11-29 ENCOUNTER — ROUTINE PRENATAL (OUTPATIENT)
Dept: OBGYN | Facility: CLINIC | Age: 23
End: 2018-11-29
Payer: MEDICAID

## 2018-11-29 VITALS — SYSTOLIC BLOOD PRESSURE: 98 MMHG | DIASTOLIC BLOOD PRESSURE: 68 MMHG | BODY MASS INDEX: 55.75 KG/M2 | WEIGHT: 293 LBS

## 2018-11-29 DIAGNOSIS — Z34.83 ENCOUNTER FOR SUPERVISION OF OTHER NORMAL PREGNANCY IN THIRD TRIMESTER: Primary | ICD-10-CM

## 2018-11-29 PROCEDURE — 90040 PR PRENATAL FOLLOW UP: CPT | Performed by: NURSE PRACTITIONER

## 2018-11-29 NOTE — PROGRESS NOTES
Pt here today for OB follow up  Pt states no complaints  Reports +FM  Good # [phone]  Pharmacy Confirmed.  Chaperone offered and none needed.

## 2018-11-29 NOTE — PROGRESS NOTES
S) Pt is a 23 y.o.   at 32w4d  gestation. Routine prenatal care today. Pt states they had to move out of their trailer into a br hotel over the last week.    Fetal movement Normal  Cramping no  VB no  LOF no   Denies dysuria. Generally feels well today. Good self-care activities identified. Denies headaches, swelling, visual changes, or epigastric pain .     O) Blood pressure (!) 98/68, weight (!) 152 kg (335 lb), last menstrual period 2018, not currently breastfeeding.        Labs: WNL       PNL: WNL       GCT: 114       AFP: Normal       GBS: N/A       Pertinent ultrasound -  18 survey WNL with exception of not being able to see some anatomy, EVELYN 17.99; 18 heart and diaphragm seen, EVELYN 15.39           A) IUP at 32w4d       S=D         Patient Active Problem List    Diagnosis Date Noted   • Supervision of normal pregnancy 2015   • Morbid obesity with BMI of 50.0-59.9, adult ([facility]) 2015          SVE: deferred         TDAP: yes       FLU: yes        BTL: yes       : no       C/S Consent: no       IOL or C/S scheduled: no       LAST PAP:          P) s/s ptl vs general discomforts.   Fetal movements reviewed. General ed and anticipatory guidance. Nutrition/exercise/vitamin. Plans breast Plans pp contraception- BTL    List given for community resources, ie help with diapers, housing  RTC 2 weeks or PRN.      .

## 2018-12-01 ENCOUNTER — HOSPITAL ENCOUNTER (OUTPATIENT)
Facility: MEDICAL CENTER | Age: 23
End: 2018-12-01
Attending: OBSTETRICS & GYNECOLOGY | Admitting: OBSTETRICS & GYNECOLOGY
Payer: MEDICAID

## 2018-12-01 VITALS
WEIGHT: 293 LBS | TEMPERATURE: 98.3 F | SYSTOLIC BLOOD PRESSURE: 109 MMHG | DIASTOLIC BLOOD PRESSURE: 70 MMHG | HEART RATE: 85 BPM | HEIGHT: 65 IN | BODY MASS INDEX: 48.82 KG/M2

## 2018-12-01 LAB
APPEARANCE UR: ABNORMAL
COLOR UR AUTO: ABNORMAL
GLUCOSE UR QL STRIP.AUTO: NEGATIVE MG/DL
KETONES UR QL STRIP.AUTO: NEGATIVE MG/DL
LEUKOCYTE ESTERASE UR QL STRIP.AUTO: ABNORMAL
NITRITE UR QL STRIP.AUTO: NEGATIVE
PH UR STRIP.AUTO: 6 [PH]
PROT UR QL STRIP: 30 MG/DL
RBC UR QL AUTO: NEGATIVE
SP GR UR: >=1.03

## 2018-12-01 PROCEDURE — 59025 FETAL NON-STRESS TEST: CPT

## 2018-12-01 PROCEDURE — 81002 URINALYSIS NONAUTO W/O SCOPE: CPT

## 2018-12-02 NOTE — PROGRESS NOTES
2254- 22 yo  EDC 19 32w 6d. Reports to unit c/o some occasional dizziness, pelvic pressure and round ligament pain. Pt also states she wasn't able to get the full 10 counts in when she did her kick counts today. EFM and TOCO applied. VSS.  [id number]- reactive NST obtained.  2332- R Moose CNILEANA called and notified of pt arrival and status. Result of urine dip given. Dark urine noted with SG of 1.030 and 1 + protein. Orders to encourage her to stay hydrated and to educate her on issues with postural hypotension during pernancy and especially when dehydrated. Encourage her to follow up at scheduled apt. Discharge her home with labor precautions.   2340- Pt given discharge instructions and labor precautions reviewed. Encouraged hydration and postural hypotension reviewed. Pt verbalized understanding and all questions answered.

## 2018-12-09 ENCOUNTER — APPOINTMENT (OUTPATIENT)
Dept: RADIOLOGY | Facility: MEDICAL CENTER | Age: 23
End: 2018-12-09
Payer: MEDICAID

## 2018-12-09 ENCOUNTER — HOSPITAL ENCOUNTER (OUTPATIENT)
Facility: MEDICAL CENTER | Age: 23
End: 2018-12-09
Attending: OBSTETRICS & GYNECOLOGY | Admitting: OBSTETRICS & GYNECOLOGY
Payer: MEDICAID

## 2018-12-09 ENCOUNTER — HOSPITAL ENCOUNTER (EMERGENCY)
Facility: MEDICAL CENTER | Age: 23
End: 2018-12-09
Attending: EMERGENCY MEDICINE
Payer: MEDICAID

## 2018-12-09 VITALS
TEMPERATURE: 97 F | RESPIRATION RATE: 18 BRPM | DIASTOLIC BLOOD PRESSURE: 74 MMHG | HEART RATE: 89 BPM | OXYGEN SATURATION: 99 % | SYSTOLIC BLOOD PRESSURE: 131 MMHG

## 2018-12-09 VITALS
TEMPERATURE: 96.8 F | BODY MASS INDEX: 48.82 KG/M2 | HEART RATE: 97 BPM | SYSTOLIC BLOOD PRESSURE: 114 MMHG | DIASTOLIC BLOOD PRESSURE: 74 MMHG | HEIGHT: 65 IN | WEIGHT: 293 LBS

## 2018-12-09 DIAGNOSIS — Z3A.34 34 WEEKS GESTATION OF PREGNANCY: ICD-10-CM

## 2018-12-09 DIAGNOSIS — R42 DIZZINESS: ICD-10-CM

## 2018-12-09 LAB
ALBUMIN SERPL BCP-MCNC: 3.3 G/DL (ref 3.2–4.9)
ALBUMIN/GLOB SERPL: 0.9 G/DL
ALP SERPL-CCNC: 159 U/L (ref 30–99)
ALT SERPL-CCNC: 51 U/L (ref 2–50)
ANION GAP SERPL CALC-SCNC: 7 MMOL/L (ref 0–11.9)
APTT PPP: 28 SEC (ref 24.7–36)
AST SERPL-CCNC: 26 U/L (ref 12–45)
BASOPHILS # BLD AUTO: 0.2 % (ref 0–1.8)
BASOPHILS # BLD: 0.02 K/UL (ref 0–0.12)
BILIRUB SERPL-MCNC: 0.3 MG/DL (ref 0.1–1.5)
BNP SERPL-MCNC: 13 PG/ML (ref 0–100)
BUN SERPL-MCNC: 8 MG/DL (ref 8–22)
CALCIUM SERPL-MCNC: 9.1 MG/DL (ref 8.5–10.5)
CHLORIDE SERPL-SCNC: 108 MMOL/L (ref 96–112)
CO2 SERPL-SCNC: 19 MMOL/L (ref 20–33)
CREAT SERPL-MCNC: 0.59 MG/DL (ref 0.5–1.4)
EKG IMPRESSION: NORMAL
EOSINOPHIL # BLD AUTO: 0.13 K/UL (ref 0–0.51)
EOSINOPHIL NFR BLD: 1.5 % (ref 0–6.9)
ERYTHROCYTE [DISTWIDTH] IN BLOOD BY AUTOMATED COUNT: 39.2 FL (ref 35.9–50)
GLOBULIN SER CALC-MCNC: 3.7 G/DL (ref 1.9–3.5)
GLUCOSE SERPL-MCNC: 116 MG/DL (ref 65–99)
HCT VFR BLD AUTO: 38.7 % (ref 37–47)
HGB BLD-MCNC: 12.8 G/DL (ref 12–16)
IMM GRANULOCYTES # BLD AUTO: 0.02 K/UL (ref 0–0.11)
IMM GRANULOCYTES NFR BLD AUTO: 0.2 % (ref 0–0.9)
INR PPP: 1.1 (ref 0.87–1.13)
LIPASE SERPL-CCNC: 43 U/L (ref 11–82)
LYMPHOCYTES # BLD AUTO: 1.36 K/UL (ref 1–4.8)
LYMPHOCYTES NFR BLD: 16.2 % (ref 22–41)
MCH RBC QN AUTO: 27.7 PG (ref 27–33)
MCHC RBC AUTO-ENTMCNC: 33.1 G/DL (ref 33.6–35)
MCV RBC AUTO: 83.8 FL (ref 81.4–97.8)
MONOCYTES # BLD AUTO: 0.33 K/UL (ref 0–0.85)
MONOCYTES NFR BLD AUTO: 3.9 % (ref 0–13.4)
NEUTROPHILS # BLD AUTO: 6.53 K/UL (ref 2–7.15)
NEUTROPHILS NFR BLD: 78 % (ref 44–72)
NRBC # BLD AUTO: 0 K/UL
NRBC BLD-RTO: 0 /100 WBC
PLATELET # BLD AUTO: 256 K/UL (ref 164–446)
PMV BLD AUTO: 9.5 FL (ref 9–12.9)
POTASSIUM SERPL-SCNC: 3.7 MMOL/L (ref 3.6–5.5)
PROT SERPL-MCNC: 7 G/DL (ref 6–8.2)
PROTHROMBIN TIME: 14.3 SEC (ref 12–14.6)
RBC # BLD AUTO: 4.62 M/UL (ref 4.2–5.4)
SODIUM SERPL-SCNC: 134 MMOL/L (ref 135–145)
TROPONIN I SERPL-MCNC: <0.01 NG/ML (ref 0–0.04)
WBC # BLD AUTO: 8.4 K/UL (ref 4.8–10.8)

## 2018-12-09 PROCEDURE — 85610 PROTHROMBIN TIME: CPT

## 2018-12-09 PROCEDURE — 93005 ELECTROCARDIOGRAM TRACING: CPT | Performed by: EMERGENCY MEDICINE

## 2018-12-09 PROCEDURE — 84484 ASSAY OF TROPONIN QUANT: CPT

## 2018-12-09 PROCEDURE — 85730 THROMBOPLASTIN TIME PARTIAL: CPT

## 2018-12-09 PROCEDURE — 93005 ELECTROCARDIOGRAM TRACING: CPT

## 2018-12-09 PROCEDURE — 80053 COMPREHEN METABOLIC PANEL: CPT

## 2018-12-09 PROCEDURE — 83690 ASSAY OF LIPASE: CPT

## 2018-12-09 PROCEDURE — 59025 FETAL NON-STRESS TEST: CPT

## 2018-12-09 PROCEDURE — 99284 EMERGENCY DEPT VISIT MOD MDM: CPT

## 2018-12-09 PROCEDURE — 83880 ASSAY OF NATRIURETIC PEPTIDE: CPT

## 2018-12-09 PROCEDURE — 36415 COLL VENOUS BLD VENIPUNCTURE: CPT

## 2018-12-09 PROCEDURE — 85025 COMPLETE CBC W/AUTO DIFF WBC: CPT

## 2018-12-09 ASSESSMENT — LIFESTYLE VARIABLES: DO YOU DRINK ALCOHOL: NO

## 2018-12-09 NOTE — ED TRIAGE NOTES
Chief Complaint   Patient presents with   • Dizziness   • Arm Pain   • Near Syncopal     Pt reports that she woke at 1000 this morning felt dizzy and like she was going to pass out. So she ate, did not feel better. Now reports right arm pain. Pt tearful in triage. Pt reports that she is 34 weeks pregnant.  Pt reports recent possible DVT in right leg. States that her leg was very swollen and tender but that they did not find a DVT.    Pt reports that this happened last week as well and she was checked in L&D and sent home. Pt reports that she has had some intermittent contractions but that they stopped yesterday. Pt reports that she is feeling baby move.   Blood pressure 134/80, pulse 100, temperature 36 °C (96.8 °F), temperature source Temporal, resp. rate 16, last menstrual period 03/25/2018, SpO2 99 %, not currently breastfeeding.    Pt informed of wait times. Educated on triage process.  Asked to return to triage RN for any new or worsening of symptoms. Thanked for patience.

## 2018-12-10 NOTE — PROGRESS NOTES
1729- 24yo  KATLYN 2019 34w. Presents to unit c/o occasional contractions that have been happening the last few days. Pt also was just seen in the ER before coming up to us c/o right arm numbness and chest pain. She received a work up in the ER and no longer has any of those symptoms but wanted to check up on her baby. EFM and TOCO applied and reading well.  - reactive NST obtained.   - Dr Monet updated on pt arrival and status. Only one UC noted on strip and pt not feeling any at this time. Orders to discharge pt home with labor precautions. Discharge instructions given with labor precautions. All questions answered at this time.

## 2018-12-10 NOTE — ED PROVIDER NOTES
ED Provider Note    CHIEF COMPLAINT  Chief Complaint   Patient presents with   • Dizziness   • Arm Pain   • Near Syncopal       HPI  Mayra Rojas is a 23 y.o. female who presents to the emergency department stating that when she woke up this morning she felt lightheaded and dizzy she had a mild headache her right arm felt a little bit heavy but was working fine.  The patient is currently 34 weeks pregnant and states that every day for the last 3 days she has been having what she feels her contractions and they occur at the same time during the day usually in the more they resolve and she is okay for the rest of the day.  At this time she is not having any abdominal or pelvic pain and no vaginal bleeding or discharge.  All of her symptoms from this morning have resolved.    REVIEW OF SYSTEMS no headache at this time no fever or chills no chest pain palpitations or hemoptysis.  All other systems negative    PAST MEDICAL HISTORY  History reviewed. No pertinent past medical history.    FAMILY HISTORY  Family History   Problem Relation Age of Onset   • Cancer Maternal Grandmother         breast   • Hypertension Mother        SOCIAL HISTORY  Social History     Social History   • Marital status: Single     Spouse name: N/A   • Number of children: N/A   • Years of education: N/A     Social History Main Topics   • Smoking status: Never Smoker   • Smokeless tobacco: Never Used   • Alcohol use No   • Drug use: No   • Sexual activity: Yes     Partners: Male     Birth control/ protection: Condom      Comment: Wants PP BTL     Other Topics Concern   • Not on file     Social History Narrative   • No narrative on file       SURGICAL HISTORY  History reviewed. No pertinent surgical history.    CURRENT MEDICATIONS  Home Medications     Reviewed by Rhiannon Mccarthy R.N. (Registered Nurse) on 12/09/18 at 1534  Med List Status: Partial   Medication Last Dose Status   Prenatal MV-Min-Fe Fum-FA-DHA (PRENATAL 1 PO)  Taking Active                ALLERGIES  Allergies   Allergen Reactions   • Nkda [No Known Drug Allergy]        PHYSICAL EXAM  VITAL SIGNS: /74   Pulse 89   Temp 36.1 °C (97 °F) (Temporal)   Resp 18   LMP 03/25/2018   SpO2 99%    Oxygen saturation is interpreted as adequate  Constitutional: Awake verbal anxious but otherwise nontoxic-appearing  HENT: Mucous membranes are moist  Eyes: No erythema discharge or jaundice pupils are round and extraocular motion present  Neck: Trachea midline no JVD  Cardiovascular: Regular rate and rhythm  Lungs: Clear and equal bilaterally with no apparent difficulty breathing  Abdomen/Back: Morbidly obese soft nontender no peritoneal findings  Skin: Warm and dry  Musculoskeletal: No acute bony deformity I do not detect any edema or swelling of the upper extremities, the patient has extensive varicose veins of the lower extremities which she says are chronic and unchanged.  She does not have pitting edema in the lower extremities.  The legs are extremely large with adipose tissue  Neurologic: Awake verbal moving all extremities the patient is able to carry on a complete lucid conversation with a clear voice and no difficulty, the face moves normally and she has good strength and coordination in the upper and lower extremities    CHART REVIEW  The patient had a Doppler ultrasound of the right leg on September 17, 2018 which showed no evidence of DVT    Laboratory  CBC shows white blood cell count of 8.4 hemoglobin is adequate 12.8 basic metabolic panel shows a slightly low bicarb of 19 troponin is normal INR is 1.1 liver functions are minimally elevated with AST of 51 and alk phos of 159 lipase is normal at 43    EKG interpretation  Twelve-lead EKG shows sinus rhythm 97 Hologic ST elevation or depression or ectopy    MEDICAL DECISION MAKING and DISPOSITION  In the emergency department the patient is essentially asymptomatic at this time.  I think it is going to be safe for her  to go home and I have advised her to follow-up with her obstetrician and she says she has an appointment on Thursday already scheduled.  If she has any recurrent episodes or has new or worsening symptoms she is to return immediately here or go to the labor and delivery for reevaluation    IMPRESSION  1.  Episode of dizziness  2.  34 weeks pregnant         Electronically signed by: Paul Salas, 12/9/2018 8:48 PM

## 2018-12-13 ENCOUNTER — ROUTINE PRENATAL (OUTPATIENT)
Dept: OBGYN | Facility: CLINIC | Age: 23
End: 2018-12-13
Payer: MEDICAID

## 2018-12-13 VITALS — BODY MASS INDEX: 55.91 KG/M2 | SYSTOLIC BLOOD PRESSURE: 110 MMHG | DIASTOLIC BLOOD PRESSURE: 72 MMHG | WEIGHT: 293 LBS

## 2018-12-13 DIAGNOSIS — Z34.83 ENCOUNTER FOR SUPERVISION OF OTHER NORMAL PREGNANCY IN THIRD TRIMESTER: ICD-10-CM

## 2018-12-13 PROCEDURE — 90040 PR PRENATAL FOLLOW UP: CPT | Performed by: NURSE PRACTITIONER

## 2018-12-13 NOTE — PROGRESS NOTES
Pt here today for OB follow up. Pt seen in ED for dizzy, syncope  Pt states having some han varner  Reports +  Good # [phone]  Pharmacy Confirmed.  Chaperone offered and none needed.

## 2018-12-13 NOTE — PROGRESS NOTES
SUBJECTIVE:  Pt is a 23 y.o.   at 34w4d  gestation. Presents today for follow-up prenatal care. Reports no issues at this time.  Reports good  fetal movement. Denies cramping/contractions, bleeding or leaking of fluid. Denies dysuria, headaches, N/V, or other issues at this time. Generally feels well today. Was seen in ED for arm numbness and chest pain but that she feels like was a panic attack because she has had one once before and it felt like this, reports no anxiety hx. Reports that she is home taking care of kids and house and at end of day she feels lower abdominal soreness and tightening of uterus on and off no more than 2 in an hour. Reports trying to drink water. No pain, redness, swelling in varicosities of legs.     OBJECTIVE:  - See prenatal vitals flow  -   Vitals:    18 0904   BP: 110/72   Weight: (!) 152.4 kg (336 lb)                 ASSESSMENT:   - IUP at 34w4d    - S=D   -   Patient Active Problem List    Diagnosis Date Noted   • Supervision of normal pregnancy 2015         PLAN:  - S/sx pregnancy and labor warning signs vs general discomforts discussed  - Fetal movements and kick counts reviewed   - Adequate hydration reinforced  - Nutrition/exercise/vitamin education; continued PNV  - Recommended pregnancy support belt and compression stockings long term; hydration tracking for min 8-10 glasses of water a day   - To call/report to LnD with increasing pain or changing symptoms   - S/p TDAP vacc   - S/p Flu vacc  - Anticipatory guidance given  - RTC in 1 weeks for follow-up prenatal care

## 2018-12-19 ENCOUNTER — HOSPITAL ENCOUNTER (OUTPATIENT)
Facility: MEDICAL CENTER | Age: 23
End: 2018-12-19
Attending: NURSE PRACTITIONER
Payer: MEDICAID

## 2018-12-19 ENCOUNTER — ROUTINE PRENATAL (OUTPATIENT)
Dept: OBGYN | Facility: CLINIC | Age: 23
End: 2018-12-19
Payer: MEDICAID

## 2018-12-19 VITALS — DIASTOLIC BLOOD PRESSURE: 76 MMHG | SYSTOLIC BLOOD PRESSURE: 104 MMHG | WEIGHT: 293 LBS | BODY MASS INDEX: 56.08 KG/M2

## 2018-12-19 DIAGNOSIS — Z34.83 SUPERVISION OF NORMAL INTRAUTERINE PREGNANCY IN MULTIGRAVIDA IN THIRD TRIMESTER: ICD-10-CM

## 2018-12-19 DIAGNOSIS — Z34.83 SUPERVISION OF NORMAL INTRAUTERINE PREGNANCY IN MULTIGRAVIDA IN THIRD TRIMESTER: Primary | ICD-10-CM

## 2018-12-19 PROCEDURE — 87081 CULTURE SCREEN ONLY: CPT

## 2018-12-19 PROCEDURE — 87150 DNA/RNA AMPLIFIED PROBE: CPT

## 2018-12-19 PROCEDURE — 90040 PR PRENATAL FOLLOW UP: CPT | Performed by: NURSE PRACTITIONER

## 2018-12-19 NOTE — PROGRESS NOTES
S) Pt is a 23 y.o.   at 35w3d  gestation. Routine prenatal care today. Pt c/o hips hurting, no further episodes of SOB.  She is still working on housing with [facility], trying to get out of motel.  She has resource sheet from our last visit together.  She was gifted a car seat and is going to go to [employer] for diapers.   Fetal movement Normal  Cramping no  VB no  LOF no   Denies dysuria. Generally feels well today. Good self-care activities identified. Denies headaches, swelling, visual changes, or epigastric pain .     O) Last menstrual period 2018, not currently breastfeeding.        Labs: WNL       PNL: WNL       GCT: 114       AFP: normal       GBS: collected today       Pertinent ultrasound - 18 limited anatomy visualized, EVELYN WNL, c/w previous dating       18 Anatomy WNL    A) IUP at 35w3d       S=D         Patient Active Problem List    Diagnosis Date Noted   • Supervision of normal pregnancy 2015          SVE: deferred         TDAP: yes       FLU: yes        BTL: yes       : no       C/S Consent: no       IOL or C/S scheduled: no       LAST PAP: 18 negative         P) s/s ptl vs general discomforts.   Fetal movements reviewed. General ed and anticipatory guidance. Nutrition/exercise/vitamin. Plans breast Plans pp contraception-BTL  GBS collected today.  RTC 1 week or PRN

## 2018-12-19 NOTE — LETTER
December 19, 2018            Mayra Rojas is pregnant with an estimated delivery date of 01/20/2019 at this time.        Thank you,          JOEY Spicer.    Electronically Signed

## 2018-12-19 NOTE — PROGRESS NOTES
Pt here today for OB follow up  Pt states no complaints  Reports +FM  Good # [phone]  Pharmacy Confirmed.  Chaperone offered and present.   GBS today

## 2018-12-20 ENCOUNTER — HOSPITAL ENCOUNTER (OUTPATIENT)
Facility: MEDICAL CENTER | Age: 23
End: 2018-12-20
Attending: OBSTETRICS & GYNECOLOGY | Admitting: OBSTETRICS & GYNECOLOGY
Payer: MEDICAID

## 2018-12-20 VITALS
HEIGHT: 65 IN | BODY MASS INDEX: 48.82 KG/M2 | SYSTOLIC BLOOD PRESSURE: 116 MMHG | DIASTOLIC BLOOD PRESSURE: 62 MMHG | WEIGHT: 293 LBS | TEMPERATURE: 97.6 F | HEART RATE: 106 BPM

## 2018-12-20 LAB — CRYSTALS AMN MICRO: NORMAL

## 2018-12-20 PROCEDURE — 59025 FETAL NON-STRESS TEST: CPT

## 2018-12-20 PROCEDURE — 89060 EXAM SYNOVIAL FLUID CRYSTALS: CPT

## 2018-12-20 RX ORDER — SODIUM CHLORIDE, SODIUM LACTATE, POTASSIUM CHLORIDE, CALCIUM CHLORIDE 600; 310; 30; 20 MG/100ML; MG/100ML; MG/100ML; MG/100ML
INJECTION, SOLUTION INTRAVENOUS CONTINUOUS
Status: DISCONTINUED | OUTPATIENT
Start: 2018-12-20 | End: 2018-12-20 | Stop reason: CLARIF

## 2018-12-21 LAB — GP B STREP DNA SPEC QL NAA+PROBE: NEGATIVE

## 2018-12-21 NOTE — PROGRESS NOTES
"S: Pt is a 23 y.o.  at 35w4d with Estimated Date of Delivery: 19 who presents to triage c/o bleeding.  She states she felt a little crampy this afternoon then noticed some blood in the toilet and when she wiped with toilet paper around 6pm tonight.  She denies intercourse x 1 week. She also states she felt like her underwear were moist this morning after she showered and were moist during the day today.  She denies any gushes of fluid or steady trickles of fluid.  She did not need to change her underwear nor wear a pad.   She denies dysuria, urinary frequency or urgency.      O: /62   Pulse (!) 106   Temp 36.4 °C (97.6 °F) (Temporal)   Ht 1.651 m (5' 5\")   Wt (!) 151.5 kg (334 lb)   LMP 2018   BMI 55.58 kg/m²     Physical Exam   Constitutional: She is oriented to person, place, and time and well-developed, well-nourished, and in no distress.   Abdominal: Soft. Bowel sounds are normal. There is no tenderness. There is no guarding and no CVA tenderness.   Genitourinary: Cervix normal. Creamy  musty  yellow and vaginal discharge found.   Musculoskeletal: She exhibits no edema.   Neurological: She is alert and oriented to person, place, and time.   Skin: Skin is warm and dry.   Psychiatric: Memory, affect and judgment normal.   Nursing note and vitals reviewed.             FHTs: baseline 145, + accels, no decels, moderate variability       Cornish: no UCs       Sterile speculum: negative pooling, negative fluid from cervix with Valsalva, yellowish discharge noted with single strand of bloody show less than pinky tip size. No harley red blood or clots visualized in vaginal vault.  Malodorous but not fishy as would expect with BV.          A/P  Patient Active Problem List    Diagnosis Date Noted   • Supervision of normal pregnancy 2015       1.  IUP @ 35w4d  2.  Category 1  NST as read by me  3.  Fern collected and pending, if negative will discharge home with PTL/bleeding precautions  4.  " F/u TPC in 1 wk    Jessica Arias CNM, APRN  Dr Garcia attending MD

## 2018-12-21 NOTE — PROGRESS NOTES
Late entry    , EDC 19, GA 35w4d. Pt presents to L&D with c/o possible LOF since 1030 and VB since 1900. Pt denies UCs and reports + Fetal movement. Pt escorted to triage room, oriented to room and unit, and external monitors applied. POC discussed with pt and encouraged to state needs or questions at any time.     MARIJA Arias CNM called and given report.     MARIJA Arias CNM at bedside, POC discussed with pt.     MARIJA Arias CNM at bedside, sterile speculum exam performed (see provider note). Sample collected for fern test. Order received to D/C pt home if fern negative.     General L&D D/C instructions reviewed with pt who states understanding. Pt d/c to self with cab voucher.

## 2018-12-28 ENCOUNTER — TELEPHONE (OUTPATIENT)
Dept: OBGYN | Facility: CLINIC | Age: 23
End: 2018-12-28

## 2018-12-28 NOTE — TELEPHONE ENCOUNTER
Pt called and stated that she felt her baby move 2x this morning and not much after that. She has drank cold milk, eat eggs and tried the kick counts. She states the baby is not meeting the kick counts. Per consult with [place], pt advised to eat something, drink something cold and try kick counts again. If baby does not meet kick counts in one hour, go to L&D to be evaluated. Pt verbalized understanding and had no other questions or concerns.

## 2019-01-02 ENCOUNTER — ROUTINE PRENATAL (OUTPATIENT)
Dept: OBGYN | Facility: CLINIC | Age: 24
End: 2019-01-02
Payer: MEDICAID

## 2019-01-02 VITALS — WEIGHT: 293 LBS | SYSTOLIC BLOOD PRESSURE: 116 MMHG | BODY MASS INDEX: 56.41 KG/M2 | DIASTOLIC BLOOD PRESSURE: 76 MMHG

## 2019-01-02 DIAGNOSIS — Z34.83 ENCOUNTER FOR SUPERVISION OF OTHER NORMAL PREGNANCY, THIRD TRIMESTER: ICD-10-CM

## 2019-01-02 PROCEDURE — 90040 PR PRENATAL FOLLOW UP: CPT | Performed by: OBSTETRICS & GYNECOLOGY

## 2019-01-02 NOTE — PROGRESS NOTES
Pt here today for OB follow up  Pt states she is experiencing cramping 2 days  Reports +FM  Good # [phone]  Pharmacy Confirmed.  Chaperone offered and accepted  Pt is experencing panic attacks and would like to be checked

## 2019-01-03 NOTE — PROGRESS NOTES
S: Pt presents for routine OB follow up. Good fetal movement.  Unsure if having contractions, has occas cramping and tightness in her abdomen  Also c/o panic attacks 2-3 times a week for the past 2 weeks, occur when doing activity or when resting, lasts approx a minute, resolves if she goes outside and takes deep breaths  Seen in ED 2 weeks ago for dizziness, CP/SOB and had a normal workup  No vaginal bleeding or leakage of fluid.    Questions answered.    O: /76   Wt (!) 153.8 kg (339 lb)   LMP 2018   BMI 56.41 kg/m²   Patients' weight gain, fluid intake and exercise level discussed.  Vitals, fundal height , fetal position, and FHR reviewed on flowsheet    Bedside MD ultrasound confirms VERTEX    Lab:  Recent Results (from the past 336 hour(s))   FERN TEST    Collection Time: 18  9:25 PM   Result Value Ref Range    Fern Test On Amniotic Fluid see below Not present       A/P:  23 y.o.  at 37w3d presents for routine obstetric follow-up.  Morbid obesity  Unfavorable cervix  Desires BTL if she has a  - tubal papers signed 18    1.  Continue prenatal vitamins.  2.  Fetal kick counts.  3.  Exercise at least 30 minutes daily.  4.  Drink at least 2L of water daily  5.  Labor precautions educated.  6.  Follow-up in 1 weeks.  7.  GBS neg  8.  Drink cold water, go outside and breathe, breathe into paper bag if having a panic attack, if symptoms persist go to ED

## 2019-01-09 ENCOUNTER — ROUTINE PRENATAL (OUTPATIENT)
Dept: OBGYN | Facility: CLINIC | Age: 24
End: 2019-01-09
Payer: MEDICAID

## 2019-01-09 VITALS — SYSTOLIC BLOOD PRESSURE: 114 MMHG | WEIGHT: 293 LBS | BODY MASS INDEX: 55.91 KG/M2 | DIASTOLIC BLOOD PRESSURE: 78 MMHG

## 2019-01-09 DIAGNOSIS — Z34.83 ENCOUNTER FOR SUPERVISION OF OTHER NORMAL PREGNANCY, THIRD TRIMESTER: ICD-10-CM

## 2019-01-09 PROCEDURE — 90040 PR PRENATAL FOLLOW UP: CPT | Performed by: OBSTETRICS & GYNECOLOGY

## 2019-01-09 NOTE — PROGRESS NOTES
OB f/u. + fetal movement.  No VB, LOF.  C/o having irregular UC's. Would like to get cervical check.  Good phone # [phone]  Preferred pharmacy confirmed.

## 2019-01-09 NOTE — PROGRESS NOTES
S: Pt presents for routine OB follow up. Good fetal movement.  Irregular contractions, no vaginal bleeding or leakage of fluid.    Questions answered.    O: /78   Wt (!) 152.4 kg (336 lb)   LMP 2018   BMI 55.91 kg/m²   Patients' weight gain, fluid intake and exercise level discussed.  Vitals, fundal height , fetal position, and FHR reviewed on flowsheet    Lab:No results found for this or any previous visit (from the past 336 hour(s)).    A/P:  23 y.o.  at 38w3d presents for routine obstetric follow-up.  Size > dates and/or scan (maternal obesity)    1.  Continue prenatal vitamins.  2.  Fetal kick counts.  3.  Exercise at least 30 minutes daily.  4.  Drink at least 2L of water daily  5.  Labor precautions educated.  6.  Follow-up in 1 weeks.  7.  GBS negative  8.  IOL referral placed for 40 weeks (pt requests IOL for around her due date)

## 2019-01-12 ENCOUNTER — HOSPITAL ENCOUNTER (OUTPATIENT)
Facility: MEDICAL CENTER | Age: 24
End: 2019-01-12
Attending: OBSTETRICS & GYNECOLOGY | Admitting: OBSTETRICS & GYNECOLOGY
Payer: MEDICAID

## 2019-01-12 VITALS
HEIGHT: 65 IN | RESPIRATION RATE: 20 BRPM | DIASTOLIC BLOOD PRESSURE: 74 MMHG | TEMPERATURE: 97 F | HEART RATE: 90 BPM | SYSTOLIC BLOOD PRESSURE: 123 MMHG | WEIGHT: 293 LBS | BODY MASS INDEX: 48.82 KG/M2

## 2019-01-12 PROCEDURE — 59025 FETAL NON-STRESS TEST: CPT

## 2019-01-12 NOTE — PROGRESS NOTES
S) patient is a G 5 P 2022 at 38 6/7 week gestation. Presents via [facility] for labor check. States no vaginal bleeding or leaking of fluid. But endorses uterine contractions. States good fetal movement. Pregnancy complicated by obese BMI.   O) vss. Cx at 4/70/-2. UC's noted mild to moderate palp. , pos accels, no decels, moderate variability. GBS negative  A) IUP at 38 6/7       Likely latent labor       Category one nst  P) Patient will dc home with instructions to return with increasing symptoms of active labor.

## 2019-01-12 NOTE — PROGRESS NOTES
0315- pt is a , 38.6 weeks gestation IUP, arrived via [facility] with c/o contraction 2-3 minutes apart since 1800. No c/o lof, dfm or bleeding. Pt left to change into gown.   0325- EFM and TOCO placed. Vss.  0330- SVE performed,   0400- Cassei HARRISON called, notified of pt c/o, nst and sve. Received orders to let pt ambulate for an hour then recheck cervix.   [id number]- pt is ambulating in L&D hallway.  0600- pt is placed back on monitors. SVE performed,   0625- Cassie HARRISON called and updated. Received orders to plan for admit, allow to ambulate for another hour and then recheck.  0645- Cassie HARRISON at bedside to perform SVE, . Received orders for discharge, pt verbalized understanding. Monitors removed and left to change into clothes.   0710- addressed discharge instructions with labor precautions, all questions answered, verbalized understanding. Ambulating off the floor with cab voucher.

## 2019-01-16 ENCOUNTER — ROUTINE PRENATAL (OUTPATIENT)
Dept: OBGYN | Facility: CLINIC | Age: 24
End: 2019-01-16
Payer: MEDICAID

## 2019-01-16 VITALS — BODY MASS INDEX: 56.58 KG/M2 | WEIGHT: 293 LBS | DIASTOLIC BLOOD PRESSURE: 72 MMHG | SYSTOLIC BLOOD PRESSURE: 118 MMHG

## 2019-01-16 DIAGNOSIS — O99.213 OBESITY COMPLICATING PREGNANCY IN THIRD TRIMESTER: ICD-10-CM

## 2019-01-16 DIAGNOSIS — Z34.83 ENCOUNTER FOR SUPERVISION OF OTHER NORMAL PREGNANCY, THIRD TRIMESTER: ICD-10-CM

## 2019-01-16 PROCEDURE — 90040 PR PRENATAL FOLLOW UP: CPT | Performed by: OBSTETRICS & GYNECOLOGY

## 2019-01-16 NOTE — PROGRESS NOTES
OB F/U  Denies VB, LOF, or UC  +FM  Phone#:[phone]  Pharmacy Confirmed.  C/O: pt would like to be check.   GBS negative

## 2019-01-16 NOTE — PROGRESS NOTES
S: Pt presents for routine OB follow up. Reports Good fetal movements.  Reports irregular  Contractions and requested sweeping of membranes today. Denies vaginal bleeding, or leakage of fluid.    Questions answered.    O: /72   Wt (!) 154.2 kg (340 lb)   LMP 2018   BMI 56.58 kg/m²   Patients' weight gain, fluid intake and exercise level discussed.  Vitals, fundal height , fetal position, and FHR reviewed on flowsheet    Lab:No results found for this or any previous visit (from the past 336 hour(s)).  SVE: /-3. Membrane sweeping performed. Pt tolerated procedure. No bleeding noted      A/P:  23 y.o.  at 39w3d presents for routine obstetric follow-up.  Size greater dates   Encounter Diagnoses   Name Primary?   • Encounter for supervision of other normal pregnancy, third trimester    • Body mass index (BMI) of 50-59.9 in adult (HCC)    • Obesity complicating pregnancy in third trimester          1.  Continue prenatal vitamins.  2.  Fetal kick counts daily.  3.  Exercise at least 30 minutes daily.  4.  Drink at least 2L of water daily  5.  Pregnancy and Labor precautions reviewed  6.  Follow-up in 1 week.  7.  GBS negative  8. Requested IOL-scheduled for 19. Info provided

## 2019-01-19 ENCOUNTER — HOSPITAL ENCOUNTER (OUTPATIENT)
Facility: MEDICAL CENTER | Age: 24
End: 2019-01-19
Attending: OBSTETRICS & GYNECOLOGY | Admitting: OBSTETRICS & GYNECOLOGY
Payer: MEDICAID

## 2019-01-19 VITALS
SYSTOLIC BLOOD PRESSURE: 118 MMHG | BODY MASS INDEX: 48.82 KG/M2 | HEIGHT: 65 IN | DIASTOLIC BLOOD PRESSURE: 81 MMHG | HEART RATE: 105 BPM | WEIGHT: 293 LBS | TEMPERATURE: 98.2 F

## 2019-01-19 PROCEDURE — 59025 FETAL NON-STRESS TEST: CPT

## 2019-01-20 ENCOUNTER — HOSPITAL ENCOUNTER (INPATIENT)
Facility: MEDICAL CENTER | Age: 24
LOS: 2 days | End: 2019-01-22
Attending: OBSTETRICS & GYNECOLOGY | Admitting: OBSTETRICS & GYNECOLOGY
Payer: MEDICAID

## 2019-01-20 ENCOUNTER — APPOINTMENT (OUTPATIENT)
Dept: OBGYN | Facility: MEDICAL CENTER | Age: 24
End: 2019-01-20
Attending: OBSTETRICS & GYNECOLOGY
Payer: MEDICAID

## 2019-01-20 LAB
BASOPHILS # BLD AUTO: 0.5 % (ref 0–1.8)
BASOPHILS # BLD: 0.03 K/UL (ref 0–0.12)
EOSINOPHIL # BLD AUTO: 0.1 K/UL (ref 0–0.51)
EOSINOPHIL NFR BLD: 1.5 % (ref 0–6.9)
ERYTHROCYTE [DISTWIDTH] IN BLOOD BY AUTOMATED COUNT: 38.5 FL (ref 35.9–50)
HCT VFR BLD AUTO: 37 % (ref 37–47)
HGB BLD-MCNC: 12.1 G/DL (ref 12–16)
HOLDING TUBE BB 8507: NORMAL
IMM GRANULOCYTES # BLD AUTO: 0.02 K/UL (ref 0–0.11)
IMM GRANULOCYTES NFR BLD AUTO: 0.3 % (ref 0–0.9)
LYMPHOCYTES # BLD AUTO: 1.28 K/UL (ref 1–4.8)
LYMPHOCYTES NFR BLD: 19.2 % (ref 22–41)
MCH RBC QN AUTO: 26.5 PG (ref 27–33)
MCHC RBC AUTO-ENTMCNC: 32.7 G/DL (ref 33.6–35)
MCV RBC AUTO: 81 FL (ref 81.4–97.8)
MONOCYTES # BLD AUTO: 0.39 K/UL (ref 0–0.85)
MONOCYTES NFR BLD AUTO: 5.9 % (ref 0–13.4)
NEUTROPHILS # BLD AUTO: 4.83 K/UL (ref 2–7.15)
NEUTROPHILS NFR BLD: 72.6 % (ref 44–72)
NRBC # BLD AUTO: 0 K/UL
NRBC BLD-RTO: 0 /100 WBC
PLATELET # BLD AUTO: 233 K/UL (ref 164–446)
PMV BLD AUTO: 10.1 FL (ref 9–12.9)
RBC # BLD AUTO: 4.57 M/UL (ref 4.2–5.4)
WBC # BLD AUTO: 6.7 K/UL (ref 4.8–10.8)

## 2019-01-20 PROCEDURE — 700111 HCHG RX REV CODE 636 W/ 250 OVERRIDE (IP): Performed by: OBSTETRICS & GYNECOLOGY

## 2019-01-20 PROCEDURE — 770002 HCHG ROOM/CARE - OB PRIVATE (112)

## 2019-01-20 PROCEDURE — 700111 HCHG RX REV CODE 636 W/ 250 OVERRIDE (IP)

## 2019-01-20 PROCEDURE — 36415 COLL VENOUS BLD VENIPUNCTURE: CPT

## 2019-01-20 PROCEDURE — 85025 COMPLETE CBC W/AUTO DIFF WBC: CPT

## 2019-01-20 PROCEDURE — 59409 OBSTETRICAL CARE: CPT

## 2019-01-20 PROCEDURE — 3E033VJ INTRODUCTION OF OTHER HORMONE INTO PERIPHERAL VEIN, PERCUTANEOUS APPROACH: ICD-10-PCS | Performed by: OBSTETRICS & GYNECOLOGY

## 2019-01-20 PROCEDURE — 303615 HCHG EPIDURAL/SPINAL ANESTHESIA FOR LABOR

## 2019-01-20 PROCEDURE — 3E0P7VZ INTRODUCTION OF HORMONE INTO FEMALE REPRODUCTIVE, VIA NATURAL OR ARTIFICIAL OPENING: ICD-10-PCS | Performed by: OBSTETRICS & GYNECOLOGY

## 2019-01-20 PROCEDURE — 700105 HCHG RX REV CODE 258

## 2019-01-20 PROCEDURE — 10907ZC DRAINAGE OF AMNIOTIC FLUID, THERAPEUTIC FROM PRODUCTS OF CONCEPTION, VIA NATURAL OR ARTIFICIAL OPENING: ICD-10-PCS | Performed by: OBSTETRICS & GYNECOLOGY

## 2019-01-20 PROCEDURE — 304965 HCHG RECOVERY SERVICES

## 2019-01-20 RX ORDER — BISACODYL 10 MG
10 SUPPOSITORY, RECTAL RECTAL PRN
Status: DISCONTINUED | OUTPATIENT
Start: 2019-01-20 | End: 2019-01-22 | Stop reason: HOSPADM

## 2019-01-20 RX ORDER — DEXTROSE, SODIUM CHLORIDE, SODIUM LACTATE, POTASSIUM CHLORIDE, AND CALCIUM CHLORIDE 5; .6; .31; .03; .02 G/100ML; G/100ML; G/100ML; G/100ML; G/100ML
INJECTION, SOLUTION INTRAVENOUS CONTINUOUS
Status: DISCONTINUED | OUTPATIENT
Start: 2019-01-20 | End: 2019-01-22 | Stop reason: HOSPADM

## 2019-01-20 RX ORDER — DOCUSATE SODIUM 100 MG/1
100 CAPSULE, LIQUID FILLED ORAL 2 TIMES DAILY PRN
Status: DISCONTINUED | OUTPATIENT
Start: 2019-01-20 | End: 2019-01-22 | Stop reason: HOSPADM

## 2019-01-20 RX ORDER — MISOPROSTOL 200 UG/1
600 TABLET ORAL
Status: DISCONTINUED | OUTPATIENT
Start: 2019-01-20 | End: 2019-01-22 | Stop reason: HOSPADM

## 2019-01-20 RX ORDER — SODIUM CHLORIDE, SODIUM LACTATE, POTASSIUM CHLORIDE, AND CALCIUM CHLORIDE .6; .31; .03; .02 G/100ML; G/100ML; G/100ML; G/100ML
1000 INJECTION, SOLUTION INTRAVENOUS
Status: DISCONTINUED | OUTPATIENT
Start: 2019-01-20 | End: 2019-01-20 | Stop reason: HOSPADM

## 2019-01-20 RX ORDER — SODIUM CHLORIDE, SODIUM LACTATE, POTASSIUM CHLORIDE, CALCIUM CHLORIDE 600; 310; 30; 20 MG/100ML; MG/100ML; MG/100ML; MG/100ML
INJECTION, SOLUTION INTRAVENOUS CONTINUOUS
Status: DISPENSED | OUTPATIENT
Start: 2019-01-20 | End: 2019-01-20

## 2019-01-20 RX ORDER — METHYLERGONOVINE MALEATE 0.2 MG/ML
0.2 INJECTION INTRAVENOUS
Status: DISCONTINUED | OUTPATIENT
Start: 2019-01-20 | End: 2019-01-22 | Stop reason: HOSPADM

## 2019-01-20 RX ORDER — METHYLERGONOVINE MALEATE 0.2 MG/ML
0.2 INJECTION INTRAVENOUS
Status: DISCONTINUED | OUTPATIENT
Start: 2019-01-20 | End: 2019-01-20 | Stop reason: HOSPADM

## 2019-01-20 RX ORDER — MISOPROSTOL 200 UG/1
800 TABLET ORAL
Status: DISCONTINUED | OUTPATIENT
Start: 2019-01-20 | End: 2019-01-20 | Stop reason: HOSPADM

## 2019-01-20 RX ORDER — VITAMIN A ACETATE, BETA CAROTENE, ASCORBIC ACID, CHOLECALCIFEROL, .ALPHA.-TOCOPHEROL ACETATE, DL-, THIAMINE MONONITRATE, RIBOFLAVIN, NIACINAMIDE, PYRIDOXINE HYDROCHLORIDE, FOLIC ACID, CYANOCOBALAMIN, CALCIUM CARBONATE, FERROUS FUMARATE, ZINC OXIDE, CUPRIC OXIDE 3080; 12; 120; 400; 1; 1.84; 3; 20; 22; 920; 25; 200; 27; 10; 2 [IU]/1; UG/1; MG/1; [IU]/1; MG/1; MG/1; MG/1; MG/1; MG/1; [IU]/1; MG/1; MG/1; MG/1; MG/1; MG/1
1 TABLET, FILM COATED ORAL EVERY MORNING
Status: DISCONTINUED | OUTPATIENT
Start: 2019-01-20 | End: 2019-01-21

## 2019-01-20 RX ORDER — ROPIVACAINE HYDROCHLORIDE 2 MG/ML
INJECTION, SOLUTION EPIDURAL; INFILTRATION; PERINEURAL CONTINUOUS
Status: DISCONTINUED | OUTPATIENT
Start: 2019-01-20 | End: 2019-01-22 | Stop reason: HOSPADM

## 2019-01-20 RX ORDER — VITAMIN A ACETATE, BETA CAROTENE, ASCORBIC ACID, CHOLECALCIFEROL, .ALPHA.-TOCOPHEROL ACETATE, DL-, THIAMINE MONONITRATE, RIBOFLAVIN, NIACINAMIDE, PYRIDOXINE HYDROCHLORIDE, FOLIC ACID, CYANOCOBALAMIN, CALCIUM CARBONATE, FERROUS FUMARATE, ZINC OXIDE, CUPRIC OXIDE 3080; 12; 120; 400; 1; 1.84; 3; 20; 22; 920; 25; 200; 27; 10; 2 [IU]/1; UG/1; MG/1; [IU]/1; MG/1; MG/1; MG/1; MG/1; MG/1; [IU]/1; MG/1; MG/1; MG/1; MG/1; MG/1
1 TABLET, FILM COATED ORAL EVERY MORNING
Status: DISCONTINUED | OUTPATIENT
Start: 2019-01-21 | End: 2019-01-22 | Stop reason: HOSPADM

## 2019-01-20 RX ORDER — BUPIVACAINE HYDROCHLORIDE 2.5 MG/ML
INJECTION, SOLUTION EPIDURAL; INFILTRATION; INTRACAUDAL
Status: ACTIVE
Start: 2019-01-20 | End: 2019-01-20

## 2019-01-20 RX ORDER — HYDROCODONE BITARTRATE AND ACETAMINOPHEN 10; 325 MG/1; MG/1
1 TABLET ORAL EVERY 4 HOURS PRN
Status: DISCONTINUED | OUTPATIENT
Start: 2019-01-20 | End: 2019-01-22 | Stop reason: HOSPADM

## 2019-01-20 RX ORDER — SODIUM CHLORIDE, SODIUM LACTATE, POTASSIUM CHLORIDE, CALCIUM CHLORIDE 600; 310; 30; 20 MG/100ML; MG/100ML; MG/100ML; MG/100ML
INJECTION, SOLUTION INTRAVENOUS PRN
Status: DISCONTINUED | OUTPATIENT
Start: 2019-01-20 | End: 2019-01-22 | Stop reason: HOSPADM

## 2019-01-20 RX ORDER — ALUMINA, MAGNESIA, AND SIMETHICONE 2400; 2400; 240 MG/30ML; MG/30ML; MG/30ML
30 SUSPENSION ORAL EVERY 6 HOURS PRN
Status: DISCONTINUED | OUTPATIENT
Start: 2019-01-20 | End: 2019-01-20 | Stop reason: HOSPADM

## 2019-01-20 RX ORDER — CARBOPROST TROMETHAMINE 250 UG/ML
250 INJECTION, SOLUTION INTRAMUSCULAR
Status: DISCONTINUED | OUTPATIENT
Start: 2019-01-20 | End: 2019-01-22 | Stop reason: HOSPADM

## 2019-01-20 RX ORDER — IBUPROFEN 600 MG/1
600 TABLET ORAL EVERY 6 HOURS PRN
Status: DISCONTINUED | OUTPATIENT
Start: 2019-01-20 | End: 2019-01-22 | Stop reason: HOSPADM

## 2019-01-20 RX ORDER — ONDANSETRON 2 MG/ML
4 INJECTION INTRAMUSCULAR; INTRAVENOUS EVERY 6 HOURS PRN
Status: DISCONTINUED | OUTPATIENT
Start: 2019-01-20 | End: 2019-01-22 | Stop reason: HOSPADM

## 2019-01-20 RX ORDER — SODIUM CHLORIDE, SODIUM LACTATE, POTASSIUM CHLORIDE, AND CALCIUM CHLORIDE .6; .31; .03; .02 G/100ML; G/100ML; G/100ML; G/100ML
250 INJECTION, SOLUTION INTRAVENOUS PRN
Status: DISCONTINUED | OUTPATIENT
Start: 2019-01-20 | End: 2019-01-20 | Stop reason: HOSPADM

## 2019-01-20 RX ORDER — ACETAMINOPHEN 325 MG/1
325 TABLET ORAL EVERY 4 HOURS PRN
Status: DISCONTINUED | OUTPATIENT
Start: 2019-01-20 | End: 2019-01-22 | Stop reason: HOSPADM

## 2019-01-20 RX ORDER — HYDROXYZINE 50 MG/1
50 TABLET, FILM COATED ORAL EVERY 6 HOURS PRN
Status: DISCONTINUED | OUTPATIENT
Start: 2019-01-20 | End: 2019-01-20 | Stop reason: HOSPADM

## 2019-01-20 RX ORDER — OXYTOCIN 10 [USP'U]/ML
10 INJECTION, SOLUTION INTRAMUSCULAR; INTRAVENOUS
Status: DISCONTINUED | OUTPATIENT
Start: 2019-01-20 | End: 2019-01-20 | Stop reason: HOSPADM

## 2019-01-20 RX ORDER — CARBOPROST TROMETHAMINE 250 UG/ML
250 INJECTION, SOLUTION INTRAMUSCULAR
Status: DISCONTINUED | OUTPATIENT
Start: 2019-01-20 | End: 2019-01-20 | Stop reason: HOSPADM

## 2019-01-20 RX ORDER — HYDROCODONE BITARTRATE AND ACETAMINOPHEN 5; 325 MG/1; MG/1
1 TABLET ORAL EVERY 4 HOURS PRN
Status: DISCONTINUED | OUTPATIENT
Start: 2019-01-20 | End: 2019-01-22 | Stop reason: HOSPADM

## 2019-01-20 RX ORDER — SODIUM CHLORIDE, SODIUM LACTATE, POTASSIUM CHLORIDE, CALCIUM CHLORIDE 600; 310; 30; 20 MG/100ML; MG/100ML; MG/100ML; MG/100ML
INJECTION, SOLUTION INTRAVENOUS
Status: COMPLETED
Start: 2019-01-20 | End: 2019-01-20

## 2019-01-20 RX ORDER — ROPIVACAINE HYDROCHLORIDE 2 MG/ML
INJECTION, SOLUTION EPIDURAL; INFILTRATION; PERINEURAL
Status: COMPLETED
Start: 2019-01-20 | End: 2019-01-20

## 2019-01-20 RX ORDER — ONDANSETRON 4 MG/1
4 TABLET, ORALLY DISINTEGRATING ORAL EVERY 6 HOURS PRN
Status: DISCONTINUED | OUTPATIENT
Start: 2019-01-20 | End: 2019-01-22 | Stop reason: HOSPADM

## 2019-01-20 RX ADMIN — SODIUM CHLORIDE, SODIUM LACTATE, POTASSIUM CHLORIDE, CALCIUM CHLORIDE 125 ML: 600; 310; 30; 20 INJECTION, SOLUTION INTRAVENOUS at 10:30

## 2019-01-20 RX ADMIN — Medication 125 ML/HR: at 18:30

## 2019-01-20 RX ADMIN — ROPIVACAINE HYDROCHLORIDE 100 ML: 2 INJECTION, SOLUTION EPIDURAL; INFILTRATION at 11:29

## 2019-01-20 RX ADMIN — Medication 2 MILLI-UNITS/MIN: at 10:10

## 2019-01-20 RX ADMIN — SODIUM CHLORIDE, POTASSIUM CHLORIDE, SODIUM LACTATE AND CALCIUM CHLORIDE 125 ML: 600; 310; 30; 20 INJECTION, SOLUTION INTRAVENOUS at 10:30

## 2019-01-20 RX ADMIN — ROPIVACAINE HYDROCHLORIDE 100 ML: 2 INJECTION, SOLUTION EPIDURAL; INFILTRATION; PERINEURAL at 11:29

## 2019-01-20 RX ADMIN — Medication 2000 ML/HR: at 17:45

## 2019-01-20 ASSESSMENT — PATIENT HEALTH QUESTIONNAIRE - PHQ9
1. LITTLE INTEREST OR PLEASURE IN DOING THINGS: NOT AT ALL
2. FEELING DOWN, DEPRESSED, IRRITABLE, OR HOPELESS: NOT AT ALL
SUM OF ALL RESPONSES TO PHQ9 QUESTIONS 1 AND 2: 0

## 2019-01-20 ASSESSMENT — LIFESTYLE VARIABLES: ALCOHOL_USE: NO

## 2019-01-20 NOTE — PROGRESS NOTES
Labor Progress Note:  [place] resident: Preeti Jennings MD, PGY-1  Attending: Dr. Khanh MICHELE: Feeling more uncomfortable and more pressure. Contractions painful    Patient Vitals for the past 4 hrs:   BP Pulse SpO2   19 1418 132/74 82 -   19 1403 136/85 78 -   19 1347 133/84 83 -   19 1302 139/92 85 -   19 1231 140/88 88 -   19 1216 111/68 85 -   19 1155 128/72 91 -   19 1152 133/79 100 -   19 1146 131/80 96 -   19 1141 121/78 95 -   19 1138 115/67 100 -   19 1137 - 99 96 %   19 1136 151/92 95 -   19 1133 - (!) 101 -   19 1132 - 98 95 %   19 1129 145/80 97 -   19 1127 - 86 97 %   19 1125 - 90 -     PE:  Gen: AAO, NAD    SVE: 9/100/-1  FHT: 134/moderate variability/+ accels/ - decels  toco: q2-3 minutes ctx      A/P: 23 y.o.  @ 40w0d by LMP consistent with 2nd trimester ultrasound. S/p induction with Cytotec and AROM.     - s/p AROM 1242, clear  - labor: active, making progress/change  - FHT: cat 1  - GBS: neg  - pain: epidural in place, contractions becoming more painful    Preeti Jennings MD, PGY-1  [facility]  Family Medicine Residency  #[id number]

## 2019-01-20 NOTE — PROGRESS NOTES
0950: Report received from MARIJA Abreu RN. Plan of care discussed. Patient presents to L&D for IOL for post dates. Patient denies LOF, VB. Patient states + FM. FOB at bedside.  1000: MD at bedside to assess patient. Orders placed at this time.  1115: Dr. Tyler at bedside to place epidural. VSS. FOB at bedside.  1242: MD at bedside to check patient. AROM.   1436: RN at bedside to check patient.   1515: RN at bedside to check patient as she states she needs to push. MD updated.   1600: Patient states she feels like she needs to push at this time. MD checks patient. RN has MD review tracing. No new orders placed.  1700: RN has MD review tracing. No new orders placed.  1730: RN at bedside to check patient.   1731: Pushing begun at this time.  1736: Delivery of a viable male infant. APGARS 8/9. Loose nuchal x1. Shoulder dystocia x10 seconds.  1745: Delivery of intact placenta. 3vc.   1945: Patient up to void at this time. Stable on feet.   2000: Patient transferred to [facility] at this time. Report given to ELSY MCCLELLAND.

## 2019-01-20 NOTE — H&P
"OB H&P:    CC: IOL    HPI:  Ms. Mayra Rojas is a 23 y.o.  @ 40w0d by LMP consistent with 2nd trimester ultrasound. Here for IOL. Was seen yesterday for irregular contractions- at that time SVE 4-/60/-2. 2 Previous births were vaginal- first was induced with pitocin. Baby#1 8lb 3oz. Baby#2 7lbs.    Contractions: No   Loss of fluid: No   Vaginal bleeding: Yes  \"bloody mucus\" per patient  Fetal movement: present    PNC with TPC    PNL: within normal limits  Blood type: AB +, antibody negative  Rh+, Rubella Immune, HIV neg, RPR negative , HBsAg NR negative, GC/CT neg/neg  Glucola: negative  GBS negative    ROS:  Const: denies fevers, general concerns  CV/resp: reports no concerns  GI: denies abd pain, GI concerns  : see HPI  Neuro: denies HA/vision changes    OB History    Para Term  AB Living   5 2 2   2 2   SAB TAB Ectopic Molar Multiple Live Births   2         2      # Outcome Date GA Lbr Alfonso/2nd Weight Sex Delivery Anes PTL Lv   5 Current            4 Term 17 40w0d  3.345 kg (7 lb 6 oz) F Vag-Spont EPI N LINDA      Birth Comments: Pt states no complications   3 SAB  10w0d    SAB         Birth Comments: passed on its own   2 Term 10/30/15 41w0d  3.72 kg (8 lb 3.2 oz) F Vag-Spont EPI N LINDA      Birth Comments: Pt states no complications   1 2013 5w0d             Birth Comments: no D&C needed        GYN: denies STIs, no cervical procedures    Med hx: Morbid obesity. Denies h/o DM, HTN, asthma, COPD    Surg hx: No previous surgeries    No current facility-administered medications on file prior to encounter.      Current Outpatient Prescriptions on File Prior to Encounter   Medication Sig Dispense Refill   • Prenatal MV-Min-Fe Fum-FA-DHA (PRENATAL 1 PO) Take  by mouth.         Family History   Problem Relation Age of Onset   • Cancer Maternal Grandmother         breast   • Hypertension Mother        Social History     Social History   • Marital status: Single     " Spouse name: N/A   • Number of children: N/A   • Years of education: N/A     Occupational History   • Not on file.     Social History Main Topics   • Smoking status: Never Smoker   • Smokeless tobacco: Never Used   • Alcohol use No   • Drug use: No   • Sexual activity: Yes     Partners: Male     Birth control/ protection: Condom      Comment: Wants PP BTL     Other Topics Concern   • Not on file     Social History Narrative   • No narrative on file     PE:  Vitals:    19 0913   BP: 123/77   Pulse: (!) 118   Weight: (!) 154.2 kg (340 lb)     Gen: AAO, NAD  CV:  RRR  Lungs: CTAB  Abd: Central obesity, Soft, gravid, NT/ND, EFW ~3.5-4kg  Ext: NT, No edema    SVE: 4-5/60%/-3  FHT: 147/moderate variability/posotive accels/ negative decels  Paxton: quiet    A/P: 23 y.o.  @ 40w0d by LMP consistent with 2nd trimester ultrasound. Here for IOL. Was seen yesterday for irregular contractions- at that time SVE 4-/60/-2. 2 Previous births were vaginal- first was induced with pitocin. Baby#1 8lb 3oz. Baby#2 7lbs.    - Admit to labor and delivery  - Pedrue's score 8, favorable cervix  - Pitocin for induction per protocol  - Category 1 FHT, continuous FH monitoring  - Close monitoring of BP's  - Anticipate , requesting epidural to be placed per anesthesia  - AROM PRN    Seen and examined with Dr. Khanh RICHMOND prior to patient's admission. Dr. Cassidy is aware of the patient and in agreement with current course of treatment/care.       Preeti Jennings MD, PGY-1  [employer]  Family Medicine Residency  #[id number]

## 2019-01-20 NOTE — PROGRESS NOTES
Patient comes in with complaints of contractions and discomfort.  She feels fetal movement, denies leaking or bleeding.  SVE  4-5/60/-2.  This is unchanged from the the SVE in the office and the check documented on 1/12. Discussed with Dr Jennifer High, patient to be discharged with labor precautions.  Reviewed labor discharge instructions with patient.  Patient to return for increasing contractions, leaking or bleeding, or decreased fetal movement.  Patient ambulated out.

## 2019-01-20 NOTE — CARE PLAN
Problem: Safety  Goal: Will remain free from falls  Outcome: PROGRESSING AS EXPECTED  Patient resting in bed with call light in reach. Bed in low position. Progressing as expected.     Problem: Infection  Goal: Will remain free from infection  Outcome: PROGRESSING AS EXPECTED  No S/S of infection present at this time. Will continue to monitor.

## 2019-01-21 LAB
ERYTHROCYTE [DISTWIDTH] IN BLOOD BY AUTOMATED COUNT: 39.3 FL (ref 35.9–50)
HCT VFR BLD AUTO: 31.7 % (ref 37–47)
HGB BLD-MCNC: 10.5 G/DL (ref 12–16)
MCH RBC QN AUTO: 26.9 PG (ref 27–33)
MCHC RBC AUTO-ENTMCNC: 33.1 G/DL (ref 33.6–35)
MCV RBC AUTO: 81.3 FL (ref 81.4–97.8)
PLATELET # BLD AUTO: 222 K/UL (ref 164–446)
PMV BLD AUTO: 9.9 FL (ref 9–12.9)
RBC # BLD AUTO: 3.9 M/UL (ref 4.2–5.4)
WBC # BLD AUTO: 8.9 K/UL (ref 4.8–10.8)

## 2019-01-21 PROCEDURE — 85027 COMPLETE CBC AUTOMATED: CPT

## 2019-01-21 PROCEDURE — 59400 OBSTETRICAL CARE: CPT | Performed by: OBSTETRICS & GYNECOLOGY

## 2019-01-21 PROCEDURE — 770002 HCHG ROOM/CARE - OB PRIVATE (112)

## 2019-01-21 PROCEDURE — 700102 HCHG RX REV CODE 250 W/ 637 OVERRIDE(OP): Performed by: OBSTETRICS & GYNECOLOGY

## 2019-01-21 PROCEDURE — A9270 NON-COVERED ITEM OR SERVICE: HCPCS | Performed by: STUDENT IN AN ORGANIZED HEALTH CARE EDUCATION/TRAINING PROGRAM

## 2019-01-21 PROCEDURE — 36415 COLL VENOUS BLD VENIPUNCTURE: CPT

## 2019-01-21 PROCEDURE — 700102 HCHG RX REV CODE 250 W/ 637 OVERRIDE(OP): Performed by: STUDENT IN AN ORGANIZED HEALTH CARE EDUCATION/TRAINING PROGRAM

## 2019-01-21 PROCEDURE — A9270 NON-COVERED ITEM OR SERVICE: HCPCS | Performed by: OBSTETRICS & GYNECOLOGY

## 2019-01-21 RX ADMIN — IBUPROFEN 600 MG: 600 TABLET ORAL at 08:11

## 2019-01-21 RX ADMIN — Medication 1 TABLET: at 07:25

## 2019-01-21 RX ADMIN — IBUPROFEN 600 MG: 600 TABLET ORAL at 20:21

## 2019-01-21 ASSESSMENT — PAIN SCALES - GENERAL
PAINLEVEL_OUTOF10: 4
PAINLEVEL_OUTOF10: 0
PAINLEVEL_OUTOF10: 4
PAINLEVEL_OUTOF10: 5

## 2019-01-21 NOTE — L&D DELIVERY NOTE
VAGINAL DELIVERY PRODEDURE NOTE    PATIENT ID:  NAME:  Mayra Rojas  MRN:               [id number]  YOB: 1995    On 2019 at 17:36, this 23 y.o. 40w0d, now , GBS negative female delivered via  under epidural anesthesia a viable term male infant, weight was pending at time this note was written. APGAR scores of 8 and 9 at one and five minutes. There was a single nuchal cord which was reduced at the perineum and the infant was bulb suctioned at delivery. Cord was doubly clamped, cut and infant handed to RN in attendance. An intact placenta was delivered spontaneously at 17:45 with 3 vessel cord. Upon vaginal exam, there were no lacerations identified. Estimated blood loss was 300cc. Patient will be transferred to postpartum in stable condition and infant to  nursery.    Preeti Jennings M.D.    Delivery attended by Dr. Cassidy who was present for the entire delivery.

## 2019-01-21 NOTE — PROGRESS NOTES
Mayra Rojas  PPD 1    Subjective:   Pain no  Ambulating yes  Tolerating liquids yes  Tolerating regular diet yes  Flatus yes  BM no  Bleeding yes  Voiding yes  Breast feeding.yes  Breast tenderness no    Blood pressure 102/63, pulse 93, temperature 36.9 °C (98.4 °F), temperature source Temporal, resp. rate 17, weight (!) 154.2 kg (340 lb), last menstrual period 03/25/2018, SpO2 91 %, currently breastfeeding.  Breast tenderness no, Engorgement yes, Mastitis no  CVS: RRR  Resp: CTA bilaterally  Abdomen: Abdomen soft, non-tender. BS normal. No masses,  No organomegaly  Fundus: Tender no, below umbilicus Yes,   Extremities: 1+ edema    Meds:   No current facility-administered medications on file prior to encounter.      Current Outpatient Prescriptions on File Prior to Encounter   Medication Sig Dispense Refill   • Prenatal MV-Min-Fe Fum-FA-DHA (PRENATAL 1 PO) Take  by mouth.         Lab:   Recent Results (from the past 48 hour(s))   Hold Blood Bank Specimen (Not Tested)    Collection Time: 01/20/19 10:00 AM   Result Value Ref Range    Holding Tube - Bb DONE    CBC WITH DIFFERENTIAL    Collection Time: 01/20/19 10:00 AM   Result Value Ref Range    WBC 6.7 4.8 - 10.8 K/uL    RBC 4.57 4.20 - 5.40 M/uL    Hemoglobin 12.1 12.0 - 16.0 g/dL    Hematocrit 37.0 37.0 - 47.0 %    MCV 81.0 (L) 81.4 - 97.8 fL    MCH 26.5 (L) 27.0 - 33.0 pg    MCHC 32.7 (L) 33.6 - 35.0 g/dL    RDW 38.5 35.9 - 50.0 fL    Platelet Count 233 164 - 446 K/uL    MPV 10.1 9.0 - 12.9 fL    Neutrophils-Polys 72.60 (H) 44.00 - 72.00 %    Lymphocytes 19.20 (L) 22.00 - 41.00 %    Monocytes 5.90 0.00 - 13.40 %    Eosinophils 1.50 0.00 - 6.90 %    Basophils 0.50 0.00 - 1.80 %    Immature Granulocytes 0.30 0.00 - 0.90 %    Nucleated RBC 0.00 /100 WBC    Neutrophils (Absolute) 4.83 2.00 - 7.15 K/uL    Lymphs (Absolute) 1.28 1.00 - 4.80 K/uL    Monos (Absolute) 0.39 0.00 - 0.85 K/uL    Eos (Absolute) 0.10 0.00 - 0.51 K/uL    Baso (Absolute) 0.03  0.00 - 0.12 K/uL    Immature Granulocytes (abs) 0.02 0.00 - 0.11 K/uL    NRBC (Absolute) 0.00 K/uL   CBC without differential    Collection Time: 19  5:54 AM   Result Value Ref Range    WBC 8.9 4.8 - 10.8 K/uL    RBC 3.90 (L) 4.20 - 5.40 M/uL    Hemoglobin 10.5 (L) 12.0 - 16.0 g/dL    Hematocrit 31.7 (L) 37.0 - 47.0 %    MCV 81.3 (L) 81.4 - 97.8 fL    MCH 26.9 (L) 27.0 - 33.0 pg    MCHC 33.1 (L) 33.6 - 35.0 g/dL    RDW 39.3 35.9 - 50.0 fL    Platelet Count 222 164 - 446 K/uL    MPV 9.9 9.0 - 12.9 fL       Assessment and Plan  PPD#1 s/p  at term  Doing well postpartum, meeting all postpartum milestones    Continue Routine postpartum care  D/w pt possible d/c home today, will decide later  Unsure of contraception at this time  No issues identified  Normal lochia

## 2019-01-21 NOTE — CARE PLAN
Problem: Potential for postpartum infection related to presence of episiotomy/vaginal tear and/or uterine contamination  Goal: Patient will be absent from signs and symptoms of infection  Outcome: PROGRESSING AS EXPECTED  Vss, sats >90 on RA    Problem: Alteration in comfort related to episiotomy, vaginal repair and/or after birth pains  Goal: Patient is able to ambulate, care for self and infant  Outcome: PROGRESSING AS EXPECTED  Pain controlled with motrin

## 2019-01-21 NOTE — CARE PLAN
Problem: Altered physiologic condition related to immediate post-delivery state and potential for bleeding/hemorrhage  Goal: Patient physiologically stable as evidenced by normal lochia, palpable uterine involution and vital signs within normal limits  Outcome: PROGRESSING AS EXPECTED  Fundus firm, lochia light     Problem: Alteration in comfort related to episiotomy, vaginal repair and/or after birth pains  Goal: Patient is able to ambulate, care for self and infant  Outcome: PROGRESSING AS EXPECTED  Patient ambulating. Caring for self and infant

## 2019-01-21 NOTE — PROGRESS NOTES
2000 Assumed care from Labor and Delivery. Oriented patient to room, call light, emergency light, TV. Plan of care reviewed, verbalized understanding. Assessment completed, fundus firm, lochia light.   7597 Patient ambulated to restroom, tolerated well, voided without difficulty.

## 2019-01-22 VITALS
HEART RATE: 84 BPM | WEIGHT: 293 LBS | BODY MASS INDEX: 56.58 KG/M2 | OXYGEN SATURATION: 94 % | SYSTOLIC BLOOD PRESSURE: 122 MMHG | RESPIRATION RATE: 16 BRPM | TEMPERATURE: 97.3 F | DIASTOLIC BLOOD PRESSURE: 87 MMHG

## 2019-01-22 PROCEDURE — 700102 HCHG RX REV CODE 250 W/ 637 OVERRIDE(OP): Performed by: OBSTETRICS & GYNECOLOGY

## 2019-01-22 PROCEDURE — A9270 NON-COVERED ITEM OR SERVICE: HCPCS | Performed by: STUDENT IN AN ORGANIZED HEALTH CARE EDUCATION/TRAINING PROGRAM

## 2019-01-22 PROCEDURE — 700102 HCHG RX REV CODE 250 W/ 637 OVERRIDE(OP): Performed by: STUDENT IN AN ORGANIZED HEALTH CARE EDUCATION/TRAINING PROGRAM

## 2019-01-22 PROCEDURE — A9270 NON-COVERED ITEM OR SERVICE: HCPCS | Performed by: OBSTETRICS & GYNECOLOGY

## 2019-01-22 RX ORDER — VITAMIN A ACETATE, BETA CAROTENE, ASCORBIC ACID, CHOLECALCIFEROL, .ALPHA.-TOCOPHEROL ACETATE, DL-, THIAMINE MONONITRATE, RIBOFLAVIN, NIACINAMIDE, PYRIDOXINE HYDROCHLORIDE, FOLIC ACID, CYANOCOBALAMIN, CALCIUM CARBONATE, FERROUS FUMARATE, ZINC OXIDE, CUPRIC OXIDE 3080; 12; 120; 400; 1; 1.84; 3; 20; 22; 920; 25; 200; 27; 10; 2 [IU]/1; UG/1; MG/1; [IU]/1; MG/1; MG/1; MG/1; MG/1; MG/1; [IU]/1; MG/1; MG/1; MG/1; MG/1; MG/1
1 TABLET, FILM COATED ORAL EVERY MORNING
Qty: 30 TAB | Refills: 6 | Status: ON HOLD | OUTPATIENT
Start: 2019-01-22 | End: 2023-12-14

## 2019-01-22 RX ORDER — IBUPROFEN 600 MG/1
600 TABLET ORAL EVERY 6 HOURS PRN
Qty: 30 TAB | Refills: 1 | Status: ON HOLD | OUTPATIENT
Start: 2019-01-22 | End: 2023-12-14

## 2019-01-22 RX ORDER — HYDROCODONE BITARTRATE AND ACETAMINOPHEN 5; 325 MG/1; MG/1
1 TABLET ORAL EVERY 4 HOURS PRN
Qty: 5 TAB | Refills: 0 | Status: SHIPPED | OUTPATIENT
Start: 2019-01-22 | End: 2019-01-24

## 2019-01-22 RX ADMIN — IBUPROFEN 600 MG: 600 TABLET ORAL at 02:30

## 2019-01-22 RX ADMIN — Medication 1 TABLET: at 11:38

## 2019-01-22 ASSESSMENT — EDINBURGH POSTNATAL DEPRESSION SCALE (EPDS)
I HAVE FELT SAD OR MISERABLE: NO, NOT AT ALL
I HAVE LOOKED FORWARD WITH ENJOYMENT TO THINGS: AS MUCH AS I EVER DID
THE THOUGHT OF HARMING MYSELF HAS OCCURRED TO ME: NEVER
I HAVE BEEN SO UNHAPPY THAT I HAVE BEEN CRYING: NO, NEVER
I HAVE BEEN ABLE TO LAUGH AND SEE THE FUNNY SIDE OF THINGS: AS MUCH AS I ALWAYS COULD
THINGS HAVE BEEN GETTING ON TOP OF ME: NO, MOST OF THE TIME I HAVE COPED QUITE WELL
I HAVE FELT SCARED OR PANICKY FOR NO GOOD REASON: NO, NOT MUCH
I HAVE BLAMED MYSELF UNNECESSARILY WHEN THINGS WENT WRONG: NOT VERY OFTEN
I HAVE BEEN SO UNHAPPY THAT I HAVE HAD DIFFICULTY SLEEPING: NOT AT ALL
I HAVE BEEN ANXIOUS OR WORRIED FOR NO GOOD REASON: HARDLY EVER

## 2019-01-22 ASSESSMENT — PAIN SCALES - GENERAL
PAINLEVEL_OUTOF10: 0

## 2019-01-22 NOTE — PROGRESS NOTES
Mom and baby bonding well, updated with plan of care, call light in reach and encourage to call for assistance.

## 2019-01-22 NOTE — DISCHARGE SUMMARY
Discharge Summary:      Mayra Rojas      Admit Date:   2019  Discharge Date:  2019     Admitting diagnosis:  Pregnancy induction of labor  Pregnancy  Discharge Diagnosis: Status post vaginal, spontaneous.  Pregnancy Complications: none  Tubal Ligation:  no        History:  History reviewed. No pertinent past medical history.  OB History    Para Term  AB Living   5 2 2   2 2   SAB TAB Ectopic Molar Multiple Live Births   2         2      # Outcome Date GA Lbr Alfonso/2nd Weight Sex Delivery Anes PTL Lv   5 Current            4 Term 17 40w0d  3.345 kg (7 lb 6 oz) F Vag-Spont EPI N LINDA      Birth Comments: Pt states no complications   3 SAB  10w0d    SAB         Birth Comments: passed on its own   2 Term 10/30/15 41w0d  3.72 kg (8 lb 3.2 oz) F Vag-Spont EPI N LINDA      Birth Comments: Pt states no complications   1 SAB  5w0d             Birth Comments: no D&C needed           Cauliflower [brassica oleracea italica] and Nkda [no known drug allergy]  Patient Active Problem List    Diagnosis Date Noted   •  (normal spontaneous vaginal delivery) 2019   • Body mass index (BMI) of 50-59.9 in adult ([facility]) 2019   • Encounter for supervision of other normal pregnancy, third trimester 2015        Hospital Course:   23 y.o. , now para 3, was admitted with the above mentioned diagnosis, underwent Induction of Labor, vaginal, spontaneous. Patient postpartum course was unremarkable, with progressive advancement in diet , ambulation and toleration of oral analgesia. Patient without complaints today and desires discharge.      Vitals:    19 0000 19 0400 19 0800 19   BP: 111/71 102/63 109/80 132/81   Pulse: 100 93 79 93   Resp: 18 17 16 18   Temp: 36.8 °C (98.2 °F) 36.9 °C (98.4 °F) 36.5 °C (97.7 °F) 36 °C (96.8 °F)   TempSrc: Temporal Temporal Temporal Temporal   SpO2: 97% 91% 95% 95%   Weight:           Current  Facility-Administered Medications   Medication Dose   • D5LR infusion     • ondansetron (ZOFRAN ODT) dispertab 4 mg  4 mg    Or   • ondansetron (ZOFRAN) syringe/vial injection 4 mg  4 mg   • oxytocin (PITOCIN) infusion (for induction)  0.5-20 alexandra-units/min   • oxytocin (PITOCIN) infusion (for postpartum)   mL/hr   • ibuprofen (MOTRIN) tablet 600 mg  600 mg   • acetaminophen (TYLENOL) tablet 325 mg  325 mg   • HYDROcodone-acetaminophen (NORCO) 5-325 MG per tablet 1 Tab  1 Tab   • HYDROcodone/acetaminophen (NORCO)  MG per tablet 1 Tab  1 Tab   • ropivacaine (NAROPIN) injection     • LR infusion     • PRN oxytocin (PITOCIN) (20 Units/1000 mL) PRN for excessive uterine bleeding - See Admin Instr  125-999 mL/hr   • miSOPROStol (CYTOTEC) tablet 600 mcg  600 mcg   • methylergonovine (METHERGINE) injection 0.2 mg  0.2 mg   • carboPROST (HEMABATE) injection 250 mcg  250 mcg   • docusate sodium (COLACE) capsule 100 mg  100 mg   • prenatal plus vitamin (STUARTNATAL 1+1) 27-1 MG tablet 1 Tab  1 Tab   • bisacodyl (DULCOLAX) suppository 10 mg  10 mg       Exam:  Breast Exam: negative  Abdomen: Abdomen soft, non-tender. BS normal. No masses,  No organomegaly  Fundus Non Tender: yes  Incision: none  Perineum: perineum intact  Extremity: extremities, peripheral pulses and reflexes normal     Labs:  Recent Labs      01/20/19   1000  01/21/19   0554   WBC  6.7  8.9   RBC  4.57  3.90*   HEMOGLOBIN  12.1  10.5*   HEMATOCRIT  37.0  31.7*   MCV  81.0*  81.3*   MCH  26.5*  26.9*   MCHC  32.7*  33.1*   RDW  38.5  39.3   PLATELETCT  233  222   MPV  10.1  9.9        Activity:   Discharge to home  Pelvic Rest x 6 weeks    Assessment:  normal postpartum course  Discharge Assessment: No areas of skin breakdown/redness; surgical incision intact/healing     [facility]: [facility] or [facility] in 5 weeks for vaginal ; 1 week for incision check.      Discharge Meds:   Current Outpatient Prescriptions   Medication Sig Dispense  Refill   • HYDROcodone-acetaminophen (NORCO) 5-325 MG Tab per tablet Take 1 Tab by mouth every four hours as needed (for after delivery) for up to 2 days. 5 Tab 0   • ibuprofen (MOTRIN) 600 MG Tab Take 1 Tab by mouth every 6 hours as needed (For cramping after delivery; do not give if patient is receiving ketorolac (Toradol)). 30 Tab 1   • prenatal plus vitamin (STUARTNATAL 1+1) 27-1 MG Tab tablet Take 1 Tab by mouth every morning. 30 Tab 6       Patient states doing well this morning. Consented for narcotics. Is requesting only a few for home for back pain and sciatica. Sciatica relief measures given.     DENISSE LewN.P.

## 2019-01-22 NOTE — PROGRESS NOTES
0710- Bedside report received from KRYSTIN Gonzales.  Patient denied needs.  0858- Patient sleeping.  0943- Patient sleeping.  1138- Patient assessment done.  Patient stated that she is voiding without difficulty and passing flatus.  Patient denied dizziness and stated that she is walking without difficulty.  Discussed pain management plan and patient prefers to call for pain medication as needed.  Reviewed plan of care.  FOB at bedside.

## 2019-01-22 NOTE — DISCHARGE INSTRUCTIONS
POSTPARTUM DISCHARGE INSTRUCTIONS FOR MOM    YOB: 1995   Age: 23 y.o.               Admit Date: 1/20/2019     Discharge Date: 1/22/2019  Attending Doctor:  Dick Cassidy M.D.                  Allergies:  Cauliflower [brassica oleracea italica] and Nkda [no known drug allergy]    Discharged to home by car. Discharged via wheelchair, hospital escort: Yes.  Special equipment needed: Not Applicable  Belongings with: Personal  Be sure to schedule a follow-up appointment with your primary care doctor or any specialists as instructed.     Discharge Plan:   Diet Plan: Discussed  Activity Level: Discussed  Confirmed Follow up Appointment: Patient to Call and Schedule Appointment  Confirmed Symptoms Management: Discussed  Medication Reconciliation Updated: Yes  Influenza Vaccine Indication: Not indicated: Previously immunized this influenza season and > 8 years of age    REASONS TO CALL YOUR OBSTETRICIAN:  1.   Persistent fever or shaking chills (Temperature higher than 100.4)  2.   Heavy bleeding (soaking more than 1 pad per hour); Passing clots  3.   Foul odor from vagina  4.   Mastitis (Breast infection; breast pain, chills, fever, redness)  5.   Urinary pain, burning or frequency  6.   Severe depression longer than 24 hours    HAND WASHING  · Prior to handling the baby.  · Before breastfeeding or bottle feeding baby.  · After using the bathroom or changing the baby's diaper.    VAGINAL CARE  · Nothing inside vagina for 6 weeks: no sexual intercourse, tampons or douching.  · Bleeding may continue for 2-4 weeks.  Amount may vary.    · Call your physician for heavy bleeding which means soaking more than 1 pad per hour    BIRTH CONTROL  · It is possible to become pregnant at any time after delivery and while breastfeeding.  · Plan to discuss a method of birth control with your physician at your follow up visit. visit.    DIET AND ELIMINATION  · Eating more fiber (bran cereal, fruits, and vegetables) and  "drinking plenty of fluids will help to avoid constipation.  · Urinary frequency after childbirth is normal.    POSTPARTUM BLUES  During the first few days after birth, you may experience a sense of the \"blues\" which may include impatience, irritability or even crying.  These feeling come and go quickly.  However, as many as 1 in 10 women experience emotional symptoms known as postpartum depression.  Postpartum depression:  May start as early as the second or third day after delivery or take several weeks or months to develop.  Symptoms of \"blues\" are present, but are more intense:  Crying spells; loss of appetite; feelings of hopelessness or loss of control; fear of touching the baby; over concern or no concern at all about the baby; little or no concern about your own appearance/caring for yourself; and/or inability to sleep or excessive sleeping.  Contact your physician if you are experiencing any of these symptoms.  Crisis Hotline:  · [place] Crisis Hotline:  7-627-YXGQXQD  Or 1-466.188.1882  · [place] Crisis Hotline:  1-467.427.2588  Or 690-079-4297    PREVENTING SHAKEN BABY:  If you are angry or stressed, PUT THE BABY IN THE CRIB, step away, take some deep breaths, and wait until you are calm to care for the baby.  DO NOT SHAKE THE BABY.  You are not alone, call a supporter for help.  · Crisis Call Center 24/7 crisis line 884-019-7070 or 1-206.800.6421  · You can also text them, text \"ANSWER\" to 780340    QUIT SMOKING/TOBACCO USE:  I understand the use of any tobacco products increases my chance of suffering from future heart disease and could cause other illnesses which may shorten my life. Quitting the use of tobacco products is the single most important thing I can do to improve my health. For further information on smoking / tobacco cessation call a Toll Free Quit Line at 1-962.761.1705 (*[employer] [place]) or 1-797.280.5445 (American Lung Association) or you can access the web based program at " www.lungusa.org.  · [place] Tobacco Users Help Line:  (902) 378-5481       Toll Free: 1-175.229.5493  · Quit Tobacco Program On license of [facility] (728)919-5420    DEPRESSION / SUICIDE RISK:  As you are discharged from this [facility], it is important to learn how to keep safe from harming yourself.    Recognize the warning signs:  · Abrupt changes in personality, positive or negative- including increase in energy   · Giving away possessions  · Change in eating patterns- significant weight changes-  positive or negative  · Change in sleeping patterns- unable to sleep or sleeping all the time   · Unwillingness or inability to communicate  · Depression  · Unusual sadness, discouragement and loneliness  · Talk of wanting to die  · Neglect of personal appearance   · Rebelliousness- reckless behavior  · Withdrawal from people/activities they love  · Confusion- inability to concentrate     If you or a loved one observes any of these behaviors or has concerns about self-harm, here's what you can do:  · Talk about it- your feelings and reasons for harming yourself  · Remove any means that you might use to hurt yourself (examples: pills, rope, extension cords, firearm)  · Get professional help from the community (Mental Health, Substance Abuse, psychological counseling)  · Do not be alone:Call your Safe Contact- someone whom you trust who will be there for you.  · Call your local CRISIS HOTLINE 112-8886 or 832-076-6780  · Call your local Children's Mobile Crisis Response Team [place] (124) 279-4809 or www.LifePics  · Call the toll free National Suicide Prevention Hotlines   · National Suicide Prevention Lifeline 893-748-QVGT (5022)  · National Hope Line Network 800-SUICIDE (403-0051)    DISCHARGE SURVEY:  Thank you for choosing On license [facility].  We hope we provided you with very good care.  You may be receiving a survey in the mail.  Please fill it out.  Your opinion is valuable to  us.    My signature on this form indicates that:  1.  I have reviewed and understand the above information  2.  My questions regarding this information have been answered to my satisfaction.  3.  I have formulated a plan with my discharge nurse to obtain my prescribed medication for home.

## 2019-01-22 NOTE — LACTATION NOTE
Called to room by Primary RN to discuss when and if pumping at the left breast should begin due to poor latch at this breast.  Assistance provided with putting infant to the left breast in cross cradle position.  Deep latch obtained.  Provided MOB with extra pillows for placing underneath infant and her arms for maximum support and comfort.  See Lactation Assessment Flow Sheet for latch score and assessment.  MOB reported increased comfort with latch.  Will hold off on initiating pumping at this time.  However, if nipple pain and/or tissue damage to nipples and areolas increases, MOB should be provided with a hospital grade breast pump and alternate with putting infant to the breast with pumping every other feed.

## 2019-01-22 NOTE — PROGRESS NOTES
Pt care assumed and assessment completed.  FOB at bedside.  Appropriate bonding with infant.  Medicated pain per MAR.  POC discussed and q2 rounds in place

## 2019-01-22 NOTE — PROGRESS NOTES
1424- Patient stated that she is ready for discharge.  Patient discharged to home, no change noted in condition, via wheelchair with infant and FOB.

## 2019-01-22 NOTE — DISCHARGE PLANNING
Discharge Planning Assessment Post Partum    Reason for Referral: History of THC (2017).  Address: [address] [place] [address]. [place] [address]  Phone: [phone]  Type of Living Situation: living with FOB and children  Mom Diagnosis: Pregnancy  Baby Diagnosis: [place]  Primary Language: MOB speaks English    Name of Baby: Fernando Villalta (: 19)  Father of the Baby: Devin Villalta   Involved in baby’s care? Yes  Contact Information: [phone]    Prenatal Care: Yes  Mom's PCP: None  PCP for new [facility]: [facility] [address] [place] [address]    Support System: FOB  Coping/Bonding between mother & baby: Yes  Source of Feeding: breast and bottle  Supplies for Infant: prepared for infant; denies any needs    Mom's Insurance: [employer]  Baby Covered on Insurance:Yes  Mother Employed/School: Not currently  Other children in the home/names & ages: 3 year old daughter-Lisbeth Alonso (10/30/15) and 17 month old daughter-Marie Alonso (17)    Financial Hardship/Income: denies   Mom's Mental status: alert and oriented  Services used prior to admit: Medicaid and WIC    CPS History: denies  Psychiatric History: denies  Domestic Violence History: denies  Drug/ETOH History: history of marijuana use.  Last used in 2017 and denies using during pregnancy.  Infant's UDS is negative.    Resources Provided: children and family resource list, counseling resource specializing in post partum depression  Referrals Made: diaper bank referral provided     Clearance for Discharge: Infant is cleared to discharge home with parents.

## 2019-01-22 NOTE — LACTATION NOTE
This note was copied from a baby's chart.  Physical assessment of baby and mother provided. Introduction to basics of initiating breastfeeding shown at this time to include posture, angle of latch, hand expression, skin to skin and normal  feeding patterns and expectations.

## 2019-01-22 NOTE — LACTATION NOTE
Mother has been breastfeeding and following with formula supplements. Reports left breast has burning pain occasionally with latch, declines assistance with breastfeeding at this time. Not interested in pumping and feels comfortable with combination feeding, does have personal breast pump at home to use of desired. Has [facility] for ongoing support as needed. Provided and reviewed supplemental feeding guidelines. Mother denies needs/questions/concerns.

## 2019-02-26 ENCOUNTER — POST PARTUM (OUTPATIENT)
Dept: OBGYN | Facility: CLINIC | Age: 24
End: 2019-02-26
Payer: MEDICAID

## 2019-02-26 VITALS — WEIGHT: 293 LBS | BODY MASS INDEX: 53.92 KG/M2 | SYSTOLIC BLOOD PRESSURE: 110 MMHG | DIASTOLIC BLOOD PRESSURE: 66 MMHG

## 2019-02-26 DIAGNOSIS — N93.9 VAGINAL BLEEDING: ICD-10-CM

## 2019-02-26 PROCEDURE — 90040 PR PRENATAL FOLLOW UP: CPT | Performed by: PHYSICIAN ASSISTANT

## 2019-02-26 ASSESSMENT — EDINBURGH POSTNATAL DEPRESSION SCALE (EPDS)
I HAVE BEEN SO UNHAPPY THAT I HAVE BEEN CRYING: NO, NEVER
TOTAL SCORE: 1
I HAVE FELT SAD OR MISERABLE: NO, NOT AT ALL
I HAVE BLAMED MYSELF UNNECESSARILY WHEN THINGS WENT WRONG: NO, NEVER
I HAVE BEEN ABLE TO LAUGH AND SEE THE FUNNY SIDE OF THINGS: AS MUCH AS I ALWAYS COULD
THINGS HAVE BEEN GETTING ON TOP OF ME: NO, MOST OF THE TIME I HAVE COPED QUITE WELL
I HAVE BEEN SO UNHAPPY THAT I HAVE HAD DIFFICULTY SLEEPING: NOT AT ALL
I HAVE BEEN ANXIOUS OR WORRIED FOR NO GOOD REASON: NO, NOT AT ALL
I HAVE FELT SCARED OR PANICKY FOR NO GOOD REASON: NO, NOT AT ALL
I HAVE LOOKED FORWARD WITH ENJOYMENT TO THINGS: AS MUCH AS I EVER DID
THE THOUGHT OF HARMING MYSELF HAS OCCURRED TO ME: NEVER

## 2019-02-26 ASSESSMENT — ENCOUNTER SYMPTOMS
CONSTITUTIONAL NEGATIVE: 1
NEUROLOGICAL NEGATIVE: 1
PSYCHIATRIC NEGATIVE: 1
CARDIOVASCULAR NEGATIVE: 1
GASTROINTESTINAL NEGATIVE: 1
MUSCULOSKELETAL NEGATIVE: 1
DEPRESSION: 0
RESPIRATORY NEGATIVE: 1
EYES NEGATIVE: 1

## 2019-02-26 NOTE — PROGRESS NOTES
Pt here today for postpartum exam.  Delivery Date: 01/20/2019  Currently breast and formula feeding  BCM[facility] BTL, [facility].   LMP: not yet  WT: 324 lb  BP: 110/66  Pt states when ever she bends down she gets pain in her stomach. Pt states since she delivered she still passing clots the size or a quarter. States no other concerns or complaints.   Good ph: [phone]

## 2019-02-26 NOTE — PROGRESS NOTES
"Subjective:      Mayra Rojas is a 23 y.o. female who presents with postpartum visit today. Pt has no complaints- denies depression, intercourse, pain or problems with BF. Pt has had incr bleeding that hasnt stopped since delivery. Pt notes she uses 2 pads at a time and does pass \"quarter-sized\" often. Denies fever, chills or abd tenderness. PAP wnl 8/18 - repeat 8/21. BCM desired is BTL, so referral placed today and pt signed the consent in early pregnancy, but will re-sign today.        HPI    Review of Systems   Constitutional: Negative.    Eyes: Negative.    Respiratory: Negative.    Cardiovascular: Negative.    Gastrointestinal: Negative.    Genitourinary: Negative.    Musculoskeletal: Negative.    Skin: Negative.    Neurological: Negative.    Endo/Heme/Allergies: Negative.    Psychiatric/Behavioral: Negative.  Negative for depression.          Objective:     /66   Wt (!) 147 kg (324 lb)   LMP 03/25/2018   BMI 53.92 kg/m²      Physical Exam   Constitutional: She appears well-developed and well-nourished.   HENT:   Head: Normocephalic and atraumatic.   Eyes: Pupils are equal, round, and reactive to light.   Neck: Normal range of motion. Neck supple. No thyromegaly present.   Cardiovascular: Normal rate, regular rhythm and normal heart sounds.    Pulmonary/Chest: Effort normal and breath sounds normal. No respiratory distress.   Abdominal: Soft. Bowel sounds are normal. She exhibits no distension. There is no tenderness.   Genitourinary: Vagina normal and uterus normal. Pelvic exam was performed with patient supine. There is no rash or tenderness on the right labia. There is no rash or tenderness on the left labia. Uterus is not deviated, not enlarged and not tender. Cervix exhibits no motion tenderness. Right adnexum displays no mass and no tenderness. Left adnexum displays no mass and no tenderness. No erythema in the vagina. No foreign body in the vagina. No signs of injury around " the vagina. No vaginal discharge found.   Genitourinary Comments: Cervix closed, no CMT. Unable to assess size of uterus due to maternal obesity. Min blood noted on glove, no clots seen.   Neurological: She is alert. She has normal reflexes.   Skin: Skin is warm and dry. No erythema.   Psychiatric: She has a normal mood and affect. Her behavior is normal. Thought content normal.   Vitals reviewed.              Assessment/Plan:     1. Vaginal bleeding  - US-PELVIC TRANSVAGINAL ONLY; Future  - REFERRAL TO GYNECOLOGY    2. Postpartum care following vaginal delivery  - REFERRAL TO GYNECOLOGY for BTL consult, consent re-signed today  - PAP wnl 8/18 - repeat 8/21

## 2019-03-12 ENCOUNTER — GYNECOLOGY VISIT (OUTPATIENT)
Dept: OBGYN | Facility: CLINIC | Age: 24
End: 2019-03-12
Payer: MEDICAID

## 2019-03-12 VITALS — BODY MASS INDEX: 54.42 KG/M2 | SYSTOLIC BLOOD PRESSURE: 110 MMHG | WEIGHT: 293 LBS | DIASTOLIC BLOOD PRESSURE: 68 MMHG

## 2019-03-12 DIAGNOSIS — Z30.014 ENCOUNTER FOR INITIAL PRESCRIPTION OF INTRAUTERINE CONTRACEPTIVE DEVICE (IUD): ICD-10-CM

## 2019-03-12 PROCEDURE — 99214 OFFICE O/P EST MOD 30 MIN: CPT | Performed by: OBSTETRICS & GYNECOLOGY

## 2019-03-12 NOTE — PROGRESS NOTES
Subjective:      Mayra Rojas is a 23 y.o. female who presents with Gynecologic Exam (BTL consult )    CC: BTL consult    HPI: 24 yo  lmp unknown (breastfeeding), using condoms for contraception presents for consultation regarding BTL.  She has finished her childbearing and desires permanent sterilization.  She has 3 children of her own and an additional step child that she cares for.  Menses are irregular, sometimes 2 months between periods.  She had some bleeding for about 8 weeks after delivery, but it stopped 3 weeks ago.  She is breast feeding at night. She has no other complaints today.     ROS REVIEW OF SYSTEMS:    Pertinent positives and negatives mentioned in HPI.    All other systems reviewed and are negative or noncontributory.       Objective:     /68   Wt (!) 148.3 kg (327 lb)   LMP 2018   Breastfeeding? Yes   BMI 54.42 kg/m²      Physical Exam      GENERAL: Alert, in no apparent distress  PSYCHIATRIC: Appropriate affect, intact insight and judgement.  CARDIOVASCULAR: RRR, no murmur, gallop, or rub.  ABDOMEN: Soft, nontender, nondistended.  No palpable masses.  No rebound or guarding.  No inguinal lymphadenopathy.  No hepatosplenomegaly.  No hernias.  Abdomen is morbidly obese, with large pannus present.         Assessment/Plan:     1. Encounter for initial prescription of intrauterine contraceptive device (IUD)  Discussed all forms of contraception to include oral contraceptive pills (combined and progesterone only), Depo Provera, Ortho Evra, Nuva Ring, Nexplanon, Mirena, Marialuisa, and Paraguard IUDs, Permanent sterilization in the form of laparoscopic bilateral tubal ligation, vasectomy, and condom use.  Reviewed failure rates of less than 1% and common side effects.     We discussed that her weight puts her at increased risks of complications associated with laparoscopic BTL, and would make the procedure technically very difficult.  I have recommended a Mirena  IUD, which would provide contraception and potentially control future problems with bleeding associated with anovulation.  Discussed failure rates of <1% over five years and common side effects of amenorrhea, irregular, or unscheduled vaginal bleeding.  Recommend  Ibuprofen 800 mg PO 1 hour prior to insertion.  We also discussed weight loss.     F/U for Mirena IUD insertion.  Continue with condom use until procedure.     - REFERRAL TO GYNECOLOGY

## 2020-12-20 PROCEDURE — 99284 EMERGENCY DEPT VISIT MOD MDM: CPT

## 2020-12-20 PROCEDURE — 93005 ELECTROCARDIOGRAM TRACING: CPT

## 2020-12-20 PROCEDURE — 93005 ELECTROCARDIOGRAM TRACING: CPT | Performed by: EMERGENCY MEDICINE

## 2020-12-21 ENCOUNTER — HOSPITAL ENCOUNTER (EMERGENCY)
Facility: MEDICAL CENTER | Age: 25
End: 2020-12-21
Attending: EMERGENCY MEDICINE
Payer: MEDICAID

## 2020-12-21 VITALS
HEART RATE: 86 BPM | OXYGEN SATURATION: 95 % | HEIGHT: 65 IN | TEMPERATURE: 98 F | RESPIRATION RATE: 16 BRPM | BODY MASS INDEX: 48.82 KG/M2 | WEIGHT: 293 LBS | DIASTOLIC BLOOD PRESSURE: 90 MMHG | SYSTOLIC BLOOD PRESSURE: 134 MMHG

## 2020-12-21 DIAGNOSIS — U07.1 COVID-19: Primary | ICD-10-CM

## 2020-12-21 LAB — EKG IMPRESSION: NORMAL

## 2020-12-21 ASSESSMENT — ENCOUNTER SYMPTOMS
FEVER: 1
COUGH: 1
ABDOMINAL PAIN: 0
NAUSEA: 0
CHILLS: 1
SHORTNESS OF BREATH: 1
VOMITING: 0

## 2020-12-21 NOTE — ED TRIAGE NOTES
Chief Complaint   Patient presents with   • Shortness of Breath   • Chest Pain     Pt walked into triage with a steady gait c/o chest pain worse with coughing and SOB.  Pt tested positive for covid on Mon but has felt more SOB today    Pt & staff masked and in appropriate PPE during encounter.  Explained pt the triage process, made pt aware to tell the RN/staff of any changes/concerns, pt verbalized understanding of process and instructions given.  Pt to Senior lounge

## 2020-12-21 NOTE — ED PROVIDER NOTES
"ED Provider Note   2020  1:05 AM    Means of Arrival: Walk In  History obtained by: patient  Limitations:none    CHIEF COMPLAINT  Chief Complaint   Patient presents with   • Shortness of Breath   • Chest Pain       HPI  Mayrajessica Rojas is a 25 y.o. female with concerns of feeling short of breath and cough.  Multiple family members ill with COVID-19. Some of have  from disease recently.  She started to have symptoms 1 week ago.  She tested positive later in the week.  Initial symptoms were sore throat, headaches, cough, and fever.  She says now when she walks she feels more short of breath than before.  Denies any shortness of breath when at rest.  She says sometimes when she takes a deep breath or coughs she has some chest pain.  Chest pain is generalized discomfort.    REVIEW OF SYSTEMS  Review of Systems   Constitutional: Positive for chills, fever and malaise/fatigue.   Respiratory: Positive for cough and shortness of breath.    Gastrointestinal: Negative for abdominal pain, nausea and vomiting.     See HPI for further details.     PAST MEDICAL HISTORY   Obesity    SOCIAL HISTORY  Social History     Tobacco Use   • Smoking status: Never Smoker   • Smokeless tobacco: Never Used   Substance and Sexual Activity   • Alcohol use: No   • Drug use: Yes     Types: Inhaled     Comment: marijuana 2017   • Sexual activity: Yes     Partners: Male     Birth control/protection: Condom     Comment: Wants PP BTL       SURGICAL HISTORY  patient denies any surgical history    CURRENT MEDICATIONS  Home Medications    **Home medications have not yet been reviewed for this encounter**         ALLERGIES  Allergies   Allergen Reactions   • Cauliflower [Brassica Oleracea Italica] Anaphylaxis   • Nkda [No Known Drug Allergy]        PHYSICAL EXAM  VITAL SIGNS: /90   Pulse 86   Temp 36.7 °C (98 °F) (Temporal)   Resp 16   Ht 1.651 m (5' 5\")   Wt (!) 148 kg (326 lb 4.5 oz)   SpO2 95%   BMI 54.30 kg/m²  "   Pulse ox interpretation: I interpret this pulse ox as normal.  Constitutional: Alert in no apparent distress.  Elevated BMI body habitus.  HENT: Normocephalic, Atraumatic, Bilateral external ears normal. Nose normal.   Eyes: Pupils are equal. Conjunctiva normal, non-icteric.   Heart: Regular rate and rythm, no murmurs.    Lungs: No respiratory distress, regular respirations. Clear to auscultation bilaterally.  Abdomen: Normal appearance, nondistended, nontender.  Skin: Warm, Dry, No erythema, No rash.   Neurologic: Alert, Grossly non-focal. No slurred speech. Moving extremities normally.   MSK: Moving all extremities without pain or limitations.   Psychiatric: Affect normal, Judgment normal, Mood normal, Appears appropriate and not intoxicated.   Physical Exam      COURSE & MEDICAL DECISION MAKING  Pertinent Labs & Imaging studies reviewed. (See chart for details)    1:05 AM This is an emergent evaluation of a 25 y.o., female who presents with concerns of shortness of breath in setting of COVID-19.  In my opinion she is well-appearing.  She does have risk factor of obesity for worsening disease.  Fortunately she has normal vital signs including a normal oxygen saturation on room air.  After ambulating in room with pulse ox on there were no desaturations.  There is no indication for further diagnostics at this time.  She was encouraged to return if worsening symptoms.  She already knows to continue self-isolation.     EKG was done at triage and is normal.    Results for orders placed or performed during the hospital encounter of 20   EKG   Result Value Ref Range    Report       [facility] Emergency Dept.    Test Date:  2020  Pt Name:    IRAIS TAPIA             Department: ER  MRN:        [id number]                      Room:  Gender:     Female                       Technician: [id number]  :        1995                   Requested By:ER TRIAGE PROTOCOL  Order #:    [id number]                     Reading MD: Carlo De La Fuente II, MD    Measurements  Intervals                                Axis  Rate:       77                           P:          28  TN:         164                          QRS:        61  QRSD:       88                           T:          34  QT:         384  QTc:        435    Interpretive Statements  SINUS RHYTHM  Normal rate 77  Normal intervals.  No ST elevation or depression.  Impression: Normal sinus rhythm EKG  Compared to ECG 12/09/2018 15:23:19  No significant changes  Electronically Signed On 12- 1:44:36 PST by Carlo De La Fuente II, MD             FINAL IMPRESSION  1. COVID-19 Active           Electronically signed by: Carlo De La Fuente II, M.D., 12/21/2020 1:05 AM

## 2021-05-13 ENCOUNTER — APPOINTMENT (OUTPATIENT)
Dept: RADIOLOGY | Facility: MEDICAL CENTER | Age: 26
End: 2021-05-13
Attending: EMERGENCY MEDICINE
Payer: MEDICAID

## 2021-05-13 ENCOUNTER — HOSPITAL ENCOUNTER (EMERGENCY)
Facility: MEDICAL CENTER | Age: 26
End: 2021-05-13
Attending: EMERGENCY MEDICINE
Payer: MEDICAID

## 2021-05-13 VITALS
TEMPERATURE: 100 F | BODY MASS INDEX: 41.95 KG/M2 | SYSTOLIC BLOOD PRESSURE: 116 MMHG | DIASTOLIC BLOOD PRESSURE: 69 MMHG | RESPIRATION RATE: 18 BRPM | OXYGEN SATURATION: 96 % | HEART RATE: 91 BPM | WEIGHT: 293 LBS | HEIGHT: 70 IN

## 2021-05-13 DIAGNOSIS — B34.9 ACUTE VIRAL SYNDROME: ICD-10-CM

## 2021-05-13 DIAGNOSIS — J06.9 UPPER RESPIRATORY TRACT INFECTION, UNSPECIFIED TYPE: ICD-10-CM

## 2021-05-13 DIAGNOSIS — R91.1 PULMONARY NODULE: ICD-10-CM

## 2021-05-13 LAB
ALBUMIN SERPL BCP-MCNC: 4 G/DL (ref 3.2–4.9)
ALBUMIN/GLOB SERPL: 1.1 G/DL
ALP SERPL-CCNC: 105 U/L (ref 30–99)
ALT SERPL-CCNC: 32 U/L (ref 2–50)
ANION GAP SERPL CALC-SCNC: 9 MMOL/L (ref 7–16)
APPEARANCE UR: CLEAR
AST SERPL-CCNC: 22 U/L (ref 12–45)
BACTERIA #/AREA URNS HPF: ABNORMAL /HPF
BASOPHILS # BLD AUTO: 0.3 % (ref 0–1.8)
BASOPHILS # BLD: 0.02 K/UL (ref 0–0.12)
BILIRUB SERPL-MCNC: 0.5 MG/DL (ref 0.1–1.5)
BILIRUB UR QL STRIP.AUTO: NEGATIVE
BUN SERPL-MCNC: 11 MG/DL (ref 8–22)
CALCIUM SERPL-MCNC: 8.8 MG/DL (ref 8.5–10.5)
CHLORIDE SERPL-SCNC: 105 MMOL/L (ref 96–112)
CO2 SERPL-SCNC: 24 MMOL/L (ref 20–33)
COLOR UR: YELLOW
CREAT SERPL-MCNC: 0.64 MG/DL (ref 0.5–1.4)
EOSINOPHIL # BLD AUTO: 0.09 K/UL (ref 0–0.51)
EOSINOPHIL NFR BLD: 1.3 % (ref 0–6.9)
EPI CELLS #/AREA URNS HPF: ABNORMAL /HPF
ERYTHROCYTE [DISTWIDTH] IN BLOOD BY AUTOMATED COUNT: 42.6 FL (ref 35.9–50)
FLUAV AG SPEC QL IA: NEGATIVE
FLUBV AG SPEC QL IA: NEGATIVE
GLOBULIN SER CALC-MCNC: 3.5 G/DL (ref 1.9–3.5)
GLUCOSE SERPL-MCNC: 99 MG/DL (ref 65–99)
GLUCOSE UR STRIP.AUTO-MCNC: NEGATIVE MG/DL
HCG SERPL QL: NEGATIVE
HCT VFR BLD AUTO: 50.1 % (ref 37–47)
HGB BLD-MCNC: 16 G/DL (ref 12–16)
HYALINE CASTS #/AREA URNS LPF: ABNORMAL /LPF
IMM GRANULOCYTES # BLD AUTO: 0.02 K/UL (ref 0–0.11)
IMM GRANULOCYTES NFR BLD AUTO: 0.3 % (ref 0–0.9)
KETONES UR STRIP.AUTO-MCNC: NEGATIVE MG/DL
LACTATE BLD-SCNC: 1.7 MMOL/L (ref 0.5–2)
LEUKOCYTE ESTERASE UR QL STRIP.AUTO: ABNORMAL
LIPASE SERPL-CCNC: 20 U/L (ref 11–82)
LYMPHOCYTES # BLD AUTO: 0.7 K/UL (ref 1–4.8)
LYMPHOCYTES NFR BLD: 10 % (ref 22–41)
MCH RBC QN AUTO: 27.3 PG (ref 27–33)
MCHC RBC AUTO-ENTMCNC: 31.9 G/DL (ref 33.6–35)
MCV RBC AUTO: 85.3 FL (ref 81.4–97.8)
MICRO URNS: ABNORMAL
MONOCYTES # BLD AUTO: 0.24 K/UL (ref 0–0.85)
MONOCYTES NFR BLD AUTO: 3.4 % (ref 0–13.4)
NEUTROPHILS # BLD AUTO: 5.96 K/UL (ref 2–7.15)
NEUTROPHILS NFR BLD: 84.7 % (ref 44–72)
NITRITE UR QL STRIP.AUTO: NEGATIVE
NRBC # BLD AUTO: 0 K/UL
NRBC BLD-RTO: 0 /100 WBC
PH UR STRIP.AUTO: 6 [PH] (ref 5–8)
PLATELET # BLD AUTO: 225 K/UL (ref 164–446)
PMV BLD AUTO: 9.5 FL (ref 9–12.9)
POTASSIUM SERPL-SCNC: 3.7 MMOL/L (ref 3.6–5.5)
PROT SERPL-MCNC: 7.5 G/DL (ref 6–8.2)
PROT UR QL STRIP: NEGATIVE MG/DL
RBC # BLD AUTO: 5.87 M/UL (ref 4.2–5.4)
RBC # URNS HPF: ABNORMAL /HPF
RBC UR QL AUTO: NEGATIVE
SARS-COV+SARS-COV-2 AG RESP QL IA.RAPID: NOTDETECTED
SODIUM SERPL-SCNC: 138 MMOL/L (ref 135–145)
SP GR UR STRIP.AUTO: 1.02
SPECIMEN SOURCE: NORMAL
UROBILINOGEN UR STRIP.AUTO-MCNC: 1 MG/DL
WBC # BLD AUTO: 7 K/UL (ref 4.8–10.8)
WBC #/AREA URNS HPF: ABNORMAL /HPF

## 2021-05-13 PROCEDURE — U0003 INFECTIOUS AGENT DETECTION BY NUCLEIC ACID (DNA OR RNA); SEVERE ACUTE RESPIRATORY SYNDROME CORONAVIRUS 2 (SARS-COV-2) (CORONAVIRUS DISEASE [COVID-19]), AMPLIFIED PROBE TECHNIQUE, MAKING USE OF HIGH THROUGHPUT TECHNOLOGIES AS DESCRIBED BY CMS-2020-01-R: HCPCS

## 2021-05-13 PROCEDURE — 700117 HCHG RX CONTRAST REV CODE 255: Performed by: EMERGENCY MEDICINE

## 2021-05-13 PROCEDURE — 74177 CT ABD & PELVIS W/CONTRAST: CPT

## 2021-05-13 PROCEDURE — 84703 CHORIONIC GONADOTROPIN ASSAY: CPT

## 2021-05-13 PROCEDURE — 96374 THER/PROPH/DIAG INJ IV PUSH: CPT | Mod: XU

## 2021-05-13 PROCEDURE — 80053 COMPREHEN METABOLIC PANEL: CPT

## 2021-05-13 PROCEDURE — 99285 EMERGENCY DEPT VISIT HI MDM: CPT

## 2021-05-13 PROCEDURE — 36415 COLL VENOUS BLD VENIPUNCTURE: CPT

## 2021-05-13 PROCEDURE — 83605 ASSAY OF LACTIC ACID: CPT

## 2021-05-13 PROCEDURE — 81001 URINALYSIS AUTO W/SCOPE: CPT

## 2021-05-13 PROCEDURE — 71045 X-RAY EXAM CHEST 1 VIEW: CPT

## 2021-05-13 PROCEDURE — 87400 INFLUENZA A/B EACH AG IA: CPT

## 2021-05-13 PROCEDURE — 83690 ASSAY OF LIPASE: CPT

## 2021-05-13 PROCEDURE — 71275 CT ANGIOGRAPHY CHEST: CPT

## 2021-05-13 PROCEDURE — C9803 HOPD COVID-19 SPEC COLLECT: HCPCS | Performed by: EMERGENCY MEDICINE

## 2021-05-13 PROCEDURE — 85025 COMPLETE CBC W/AUTO DIFF WBC: CPT

## 2021-05-13 PROCEDURE — 700105 HCHG RX REV CODE 258: Performed by: EMERGENCY MEDICINE

## 2021-05-13 PROCEDURE — U0005 INFEC AGEN DETEC AMPLI PROBE: HCPCS

## 2021-05-13 PROCEDURE — 700111 HCHG RX REV CODE 636 W/ 250 OVERRIDE (IP): Performed by: EMERGENCY MEDICINE

## 2021-05-13 PROCEDURE — 87426 SARSCOV CORONAVIRUS AG IA: CPT

## 2021-05-13 RX ORDER — SODIUM CHLORIDE, SODIUM LACTATE, POTASSIUM CHLORIDE, AND CALCIUM CHLORIDE .6; .31; .03; .02 G/100ML; G/100ML; G/100ML; G/100ML
30 INJECTION, SOLUTION INTRAVENOUS ONCE
Status: COMPLETED | OUTPATIENT
Start: 2021-05-13 | End: 2021-05-13

## 2021-05-13 RX ORDER — KETOROLAC TROMETHAMINE 30 MG/ML
30 INJECTION, SOLUTION INTRAMUSCULAR; INTRAVENOUS ONCE
Status: COMPLETED | OUTPATIENT
Start: 2021-05-13 | End: 2021-05-13

## 2021-05-13 RX ADMIN — SODIUM CHLORIDE, POTASSIUM CHLORIDE, SODIUM LACTATE AND CALCIUM CHLORIDE 2055 ML: 600; 310; 30; 20 INJECTION, SOLUTION INTRAVENOUS at 19:45

## 2021-05-13 RX ADMIN — IOHEXOL 100 ML: 350 INJECTION, SOLUTION INTRAVENOUS at 22:15

## 2021-05-13 RX ADMIN — KETOROLAC TROMETHAMINE 30 MG: 30 INJECTION, SOLUTION INTRAMUSCULAR; INTRAVENOUS at 19:48

## 2021-05-13 NOTE — ED TRIAGE NOTES
"Chief Complaint   Patient presents with   • Flu Like Symptoms     reports lower abd/low back pain, last night she became nauseated. slept and awoke with body aches and chills. headache. denies cough. is sneezing.      Pt to triage for above. HR noted. Rechecked and is the same. Protocol ordered.  Last period was in February.    /88   Pulse (!) 126   Temp 37.2 °C (99 °F) (Temporal)   Resp 20   Ht 1.778 m (5' 10\")   Wt (!) 163 kg (359 lb 2.1 oz)   SpO2 95%   BMI 51.53 kg/m²     "

## 2021-05-14 LAB
SARS-COV-2 RNA RESP QL NAA+PROBE: NOTDETECTED
SPECIMEN SOURCE: NORMAL

## 2021-05-14 NOTE — ED PROVIDER NOTES
ED Provider Note    CHIEF COMPLAINT  Chief Complaint   Patient presents with   • Flu Like Symptoms     reports lower abd/low back pain, last night she became nauseated. slept and awoke with body aches and chills. headache. denies cough. is sneezing.        HPI  Mayra Rojas is a 25 y.o. female who presents to the emergency department with complaint of flulike symptoms, fever, chills, headache, slight low back pain and abdominal pain.  She states it all started earlier this morning after doing a night shift last night.  She denies cough, severe shortness of breath, nausea, vomiting, recent trauma, vaginal bleeding, vaginal discharge.  Her last menstrual cycle was on February 25 and she has had negative pregnancy test since then.  The patient does endorse the fact that she had a positive Covid in December 2020  REVIEW OF SYSTEMS  Positives as above. Pertinent negatives include recent trauma, nausea, vomiting  All other 10 review of systems are negative    PAST MEDICAL HISTORY  History reviewed. No pertinent past medical history.    FAMILY HISTORY  Noncontributory    SOCIAL HISTORY  Social History     Socioeconomic History   • Marital status: Single     Spouse name: Not on file   • Number of children: Not on file   • Years of education: Not on file   • Highest education level: Not on file   Occupational History   • Not on file   Tobacco Use   • Smoking status: Never Smoker   • Smokeless tobacco: Never Used   Substance and Sexual Activity   • Alcohol use: No   • Drug use: Yes     Types: Inhaled     Comment: marijuana 2017   • Sexual activity: Yes     Partners: Male     Birth control/protection: Condom     Comment: Wants PP BTL   Other Topics Concern   • Not on file   Social History Narrative   • Not on file     Social Determinants of Health     Financial Resource Strain:    • Difficulty of Paying Living Expenses:    Food Insecurity:    • Worried About Running Out of Food in the Last Year:    • Ran Out  "of Food in the Last Year:    Transportation Needs:    • Lack of Transportation (Medical):    • Lack of Transportation (Non-Medical):    Physical Activity:    • Days of Exercise per Week:    • Minutes of Exercise per Session:    Stress:    • Feeling of Stress :    Social Connections:    • Frequency of Communication with Friends and Family:    • Frequency of Social Gatherings with Friends and Family:    • Attends Oriental orthodox Services:    • Active Member of Clubs or Organizations:    • Attends Club or Organization Meetings:    • Marital Status:    Intimate Partner Violence:    • Fear of Current or Ex-Partner:    • Emotionally Abused:    • Physically Abused:    • Sexually Abused:        SURGICAL HISTORY  History reviewed. No pertinent surgical history.    CURRENT MEDICATIONS  Home Medications     Reviewed by Gildardo Longo R.N. (Registered Nurse) on 05/13/21 at 1629  Med List Status: Partial   Medication Last Dose Status   ibuprofen (MOTRIN) 600 MG Tab  Active   Prenatal MV-Min-Fe Fum-FA-DHA (PRENATAL 1 PO)  Active   prenatal plus vitamin (STUARTNATAL 1+1) 27-1 MG Tab tablet  Active                ALLERGIES  Allergies   Allergen Reactions   • Cauliflower [Brassica Oleracea Italica] Anaphylaxis   • Nkda [No Known Drug Allergy]        PHYSICAL EXAM  VITAL SIGNS: /62   Pulse 96   Temp 37.8 °C (100 °F) (Oral)   Resp 16   Ht 1.778 m (5' 10\")   Wt (!) 163 kg (359 lb 2.1 oz)   SpO2 94%   BMI 51.53 kg/m²      Constitutional: BMI of 51.53, No acute distress, Non-toxic appearance.   Eyes: PERRLA, EOMI, Conjunctiva normal, No discharge.   Cardiovascular: Tachycardic, normal rhythm, No murmurs, No rubs, No gallops, and intact distal pulses.   Thorax & Lungs:  No respiratory distress, no rales, no rhonchi, No wheezing, No chest wall tenderness.   Abdomen: Bowel sounds normal, Soft, slight suprapubic abdominal tenderness, no guarding no rebound, No pulsatile masses.   Skin: Warm, Dry, No erythema, No rash.   Extremities: " Full range of motion, no deformity, no edema.  Neurologic: Alert & oriented x 3, No focal deficits noted, acting appropriately on exam.  Psychiatric: Affect normal for clinical presentation.      RADIOLOGY/PROCEDURES  CT-ABDOMEN-PELVIS WITH   Final Result         1.  No acute abnormality.   2.  Hepatomegaly and diffuse hepatic steatosis      CT-CTA CHEST PULMONARY ARTERY W/ RECONS   Final Result         1.  No pulmonary embolus appreciated.   2.  Left upper lobe pulmonary nodule, see nodule follow-up recommendations below.      Fleischner Society pulmonary nodule recommendations:      Low Risk: No routine follow-up      High Risk: Optional CT at 12 months      Comments: Nodules less than 6 mm do not require routine follow-up, but certain patients at high risk with suspicious nodule morphology, upper lobe location, or both may warrant 12-month follow-up.      Low Risk - Minimal or absent history of smoking and of other known risk factors.      High Risk - History of smoking or of other known risk factors.      Note: These recommendations do not apply to lung cancer screening, patients with immunosuppression, or patients with known primary cancer.      Fleischner Society 2017 Guidelines for Management of Incidentally Detected Pulmonary Nodules in Adults      DX-CHEST-LIMITED (1 VIEW)   Final Result         1.  No acute cardiopulmonary disease.        Results for orders placed or performed during the hospital encounter of 05/13/21   CBC WITH DIFFERENTIAL   Result Value Ref Range    WBC 7.0 4.8 - 10.8 K/uL    RBC 5.87 (H) 4.20 - 5.40 M/uL    Hemoglobin 16.0 12.0 - 16.0 g/dL    Hematocrit 50.1 (H) 37.0 - 47.0 %    MCV 85.3 81.4 - 97.8 fL    MCH 27.3 27.0 - 33.0 pg    MCHC 31.9 (L) 33.6 - 35.0 g/dL    RDW 42.6 35.9 - 50.0 fL    Platelet Count 225 164 - 446 K/uL    MPV 9.5 9.0 - 12.9 fL    Neutrophils-Polys 84.70 (H) 44.00 - 72.00 %    Lymphocytes 10.00 (L) 22.00 - 41.00 %    Monocytes 3.40 0.00 - 13.40 %    Eosinophils  1.30 0.00 - 6.90 %    Basophils 0.30 0.00 - 1.80 %    Immature Granulocytes 0.30 0.00 - 0.90 %    Nucleated RBC 0.00 /100 WBC    Neutrophils (Absolute) 5.96 2.00 - 7.15 K/uL    Lymphs (Absolute) 0.70 (L) 1.00 - 4.80 K/uL    Monos (Absolute) 0.24 0.00 - 0.85 K/uL    Eos (Absolute) 0.09 0.00 - 0.51 K/uL    Baso (Absolute) 0.02 0.00 - 0.12 K/uL    Immature Granulocytes (abs) 0.02 0.00 - 0.11 K/uL    NRBC (Absolute) 0.00 K/uL   COMP METABOLIC PANEL   Result Value Ref Range    Sodium 138 135 - 145 mmol/L    Potassium 3.7 3.6 - 5.5 mmol/L    Chloride 105 96 - 112 mmol/L    Co2 24 20 - 33 mmol/L    Anion Gap 9.0 7.0 - 16.0    Glucose 99 65 - 99 mg/dL    Bun 11 8 - 22 mg/dL    Creatinine 0.64 0.50 - 1.40 mg/dL    Calcium 8.8 8.5 - 10.5 mg/dL    AST(SGOT) 22 12 - 45 U/L    ALT(SGPT) 32 2 - 50 U/L    Alkaline Phosphatase 105 (H) 30 - 99 U/L    Total Bilirubin 0.5 0.1 - 1.5 mg/dL    Albumin 4.0 3.2 - 4.9 g/dL    Total Protein 7.5 6.0 - 8.2 g/dL    Globulin 3.5 1.9 - 3.5 g/dL    A-G Ratio 1.1 g/dL   LIPASE   Result Value Ref Range    Lipase 20 11 - 82 U/L   HCG QUAL SERUM   Result Value Ref Range    Beta-Hcg Qualitative Serum Negative Negative   URINALYSIS,CULTURE IF INDICATED    Specimen: Urine   Result Value Ref Range    Color Yellow     Character Clear     Specific Gravity 1.025 <1.035    Ph 6.0 5.0 - 8.0    Glucose Negative Negative mg/dL    Ketones Negative Negative mg/dL    Protein Negative Negative mg/dL    Bilirubin Negative Negative    Urobilinogen, Urine 1.0 Negative    Nitrite Negative Negative    Leukocyte Esterase Trace (A) Negative    Occult Blood Negative Negative    Micro Urine Req Microscopic    ESTIMATED GFR   Result Value Ref Range    GFR If African American >60 >60 mL/min/1.73 m 2    GFR If Non African American >60 >60 mL/min/1.73 m 2   LACTIC ACID   Result Value Ref Range    Lactic Acid 1.7 0.5 - 2.0 mmol/L   URINE MICROSCOPIC (W/UA)   Result Value Ref Range    WBC 2-5 /hpf    RBC 2-5 (A) /hpf    Bacteria  Few (A) None /hpf    Epithelial Cells Few /hpf    Hyaline Cast 0-2 /lpf   CoV, Flu A/B RAPID ANTIGEN: Collect one dry nasal swab AND NP swab in VTM    Specimen: Nasopharyngeal; Respirate   Result Value Ref Range    Influenza A AG by GEN Negative Negative    Influenza B AG by GEN Negative Negative    SARS-COV ANTIGEN GEN NotDetected Not-Detected    SARS-CoV-2 Source Nasal Swab    SARS-CoV-2 PCR (24 hour In-House): Collect NP swab in VTM    Specimen: Respirate   Result Value Ref Range    SARS-CoV-2 Source NP Swab          COURSE & MEDICAL DECISION MAKING  Pertinent Labs & Imaging studies reviewed. (See chart for details)  This is a pleasant 25-year-old female that presents with tachycardia, probable infection.  Sepsis protocol was initiated.  The patient had normal lactic acid, she does not have leukocytosis and she is afebrile.  She received 30 mils per kilogram IV fluids secondary to profound tachycardia and she responded appropriately.  Urinalysis was negative for infection, x-rays negative as well as concern for possible occult pneumonia therefore CT pulmonary was completed.  CT body was negative for pneumonia or pulmonary embolism although patient does have pulmonary nodule and she is to follow-up with a primary care physician for this.  The CT abdomen pelvis was negative for acute appendicitis, diverticulitis, colitis or significant intra-abdominal infection.  Once again the patient has no pelvic etiology of her pain and I do not suspect her having PID or other infection.  Patient probably has a viral condition resulting in her presentation today.  On reevaluation, she is ambulating with normal saturation of oxygen, she is positive p.o., she is feeling much better.  The patient be discharged with strict return precautions.    FINAL IMPRESSION     1. Upper respiratory tract infection, unspecified type Active   2. Acute viral syndrome Active   3. Pulmonary nodule          DISPOSITION:  Patient will be discharged  home in stable condition.    FOLLOW UP:  [facility], Emergency Dept  [address] [facility] [id number] [phone]    If symptoms worsen    [address] [id number] [phone]  Schedule an appointment as soon as possible for a visit in 1 week  As needed      Electronically signed by: Toni Euceda D.O., 5/13/2021 7:24 PM

## 2021-05-14 NOTE — ED NOTES
To rm 11, placed on the monitor, c/o of generalized body aches, oral temp 100.0, c/o of abd and low back pian, urine sample sent to lab.

## 2021-05-14 NOTE — ED NOTES
"Pt discharged home. IV discontinued and gauze placed, pt in possession of belongings. Pt provided discharge education and information pertaining to medications and follow up appointments. Pt received copy of discharge instructions and verbalized understanding. /62   Pulse 96   Temp 37.8 °C (100 °F) (Oral)   Resp 16   Ht 1.778 m (5' 10\")   Wt (!) 163 kg (359 lb 2.1 oz)   SpO2 94%   BMI 51.53 kg/m²     "

## 2021-05-14 NOTE — DISCHARGE INSTRUCTIONS
Return to the emergency department if you have severe pain, nausea, vomiting, shortness of breath.  Take Tylenol and ibuprofen for your body aches or fever.    You will need to follow-up with your primary care physician for evaluation of your pulmonary nodule as this may be cancerous in etiology.

## 2021-08-29 ENCOUNTER — HOSPITAL ENCOUNTER (EMERGENCY)
Facility: MEDICAL CENTER | Age: 26
End: 2021-08-29
Attending: EMERGENCY MEDICINE
Payer: MEDICAID

## 2021-08-29 VITALS
BODY MASS INDEX: 48.82 KG/M2 | OXYGEN SATURATION: 95 % | HEART RATE: 78 BPM | RESPIRATION RATE: 16 BRPM | HEIGHT: 65 IN | SYSTOLIC BLOOD PRESSURE: 150 MMHG | TEMPERATURE: 98.5 F | DIASTOLIC BLOOD PRESSURE: 83 MMHG | WEIGHT: 293 LBS

## 2021-08-29 DIAGNOSIS — R11.2 NON-INTRACTABLE VOMITING WITH NAUSEA, UNSPECIFIED VOMITING TYPE: ICD-10-CM

## 2021-08-29 LAB
ALBUMIN SERPL BCP-MCNC: 3.8 G/DL (ref 3.2–4.9)
ALBUMIN/GLOB SERPL: 1.2 G/DL
ALP SERPL-CCNC: 97 U/L (ref 30–99)
ALT SERPL-CCNC: 35 U/L (ref 2–50)
ANION GAP SERPL CALC-SCNC: 11 MMOL/L (ref 7–16)
APPEARANCE UR: CLEAR
AST SERPL-CCNC: 24 U/L (ref 12–45)
BACTERIA #/AREA URNS HPF: NEGATIVE /HPF
BASOPHILS # BLD AUTO: 0.5 % (ref 0–1.8)
BASOPHILS # BLD: 0.04 K/UL (ref 0–0.12)
BILIRUB SERPL-MCNC: 0.2 MG/DL (ref 0.1–1.5)
BILIRUB UR QL STRIP.AUTO: NEGATIVE
BUN SERPL-MCNC: 9 MG/DL (ref 8–22)
CALCIUM SERPL-MCNC: 9.2 MG/DL (ref 8.5–10.5)
CHLORIDE SERPL-SCNC: 106 MMOL/L (ref 96–112)
CO2 SERPL-SCNC: 23 MMOL/L (ref 20–33)
COLOR UR: YELLOW
CREAT SERPL-MCNC: 0.68 MG/DL (ref 0.5–1.4)
EOSINOPHIL # BLD AUTO: 0.23 K/UL (ref 0–0.51)
EOSINOPHIL NFR BLD: 2.7 % (ref 0–6.9)
EPI CELLS #/AREA URNS HPF: NEGATIVE /HPF
ERYTHROCYTE [DISTWIDTH] IN BLOOD BY AUTOMATED COUNT: 41.6 FL (ref 35.9–50)
GLOBULIN SER CALC-MCNC: 3.3 G/DL (ref 1.9–3.5)
GLUCOSE SERPL-MCNC: 100 MG/DL (ref 65–99)
GLUCOSE UR STRIP.AUTO-MCNC: NEGATIVE MG/DL
HCG UR QL: NEGATIVE
HCT VFR BLD AUTO: 43.4 % (ref 37–47)
HGB BLD-MCNC: 14.1 G/DL (ref 12–16)
HYALINE CASTS #/AREA URNS LPF: NORMAL /LPF
IMM GRANULOCYTES # BLD AUTO: 0.02 K/UL (ref 0–0.11)
IMM GRANULOCYTES NFR BLD AUTO: 0.2 % (ref 0–0.9)
KETONES UR STRIP.AUTO-MCNC: NEGATIVE MG/DL
LEUKOCYTE ESTERASE UR QL STRIP.AUTO: ABNORMAL
LIPASE SERPL-CCNC: 25 U/L (ref 11–82)
LYMPHOCYTES # BLD AUTO: 2.16 K/UL (ref 1–4.8)
LYMPHOCYTES NFR BLD: 25.3 % (ref 22–41)
MCH RBC QN AUTO: 28.4 PG (ref 27–33)
MCHC RBC AUTO-ENTMCNC: 32.5 G/DL (ref 33.6–35)
MCV RBC AUTO: 87.5 FL (ref 81.4–97.8)
MICRO URNS: ABNORMAL
MONOCYTES # BLD AUTO: 0.42 K/UL (ref 0–0.85)
MONOCYTES NFR BLD AUTO: 4.9 % (ref 0–13.4)
NEUTROPHILS # BLD AUTO: 5.66 K/UL (ref 2–7.15)
NEUTROPHILS NFR BLD: 66.4 % (ref 44–72)
NITRITE UR QL STRIP.AUTO: NEGATIVE
NRBC # BLD AUTO: 0 K/UL
NRBC BLD-RTO: 0 /100 WBC
PH UR STRIP.AUTO: 5.5 [PH] (ref 5–8)
PLATELET # BLD AUTO: 271 K/UL (ref 164–446)
PMV BLD AUTO: 9.5 FL (ref 9–12.9)
POTASSIUM SERPL-SCNC: 3.6 MMOL/L (ref 3.6–5.5)
PROT SERPL-MCNC: 7.1 G/DL (ref 6–8.2)
PROT UR QL STRIP: NEGATIVE MG/DL
RBC # BLD AUTO: 4.96 M/UL (ref 4.2–5.4)
RBC # URNS HPF: NORMAL /HPF
RBC UR QL AUTO: NEGATIVE
SODIUM SERPL-SCNC: 140 MMOL/L (ref 135–145)
SP GR UR STRIP.AUTO: 1.02
UROBILINOGEN UR STRIP.AUTO-MCNC: 0.2 MG/DL
WBC # BLD AUTO: 8.5 K/UL (ref 4.8–10.8)
WBC #/AREA URNS HPF: NORMAL /HPF

## 2021-08-29 PROCEDURE — 81001 URINALYSIS AUTO W/SCOPE: CPT

## 2021-08-29 PROCEDURE — 85025 COMPLETE CBC W/AUTO DIFF WBC: CPT

## 2021-08-29 PROCEDURE — 99285 EMERGENCY DEPT VISIT HI MDM: CPT

## 2021-08-29 PROCEDURE — 83690 ASSAY OF LIPASE: CPT

## 2021-08-29 PROCEDURE — 700105 HCHG RX REV CODE 258: Performed by: EMERGENCY MEDICINE

## 2021-08-29 PROCEDURE — 81025 URINE PREGNANCY TEST: CPT

## 2021-08-29 PROCEDURE — 700111 HCHG RX REV CODE 636 W/ 250 OVERRIDE (IP): Performed by: EMERGENCY MEDICINE

## 2021-08-29 PROCEDURE — 36415 COLL VENOUS BLD VENIPUNCTURE: CPT

## 2021-08-29 PROCEDURE — 80053 COMPREHEN METABOLIC PANEL: CPT

## 2021-08-29 PROCEDURE — 96374 THER/PROPH/DIAG INJ IV PUSH: CPT

## 2021-08-29 RX ORDER — ONDANSETRON 2 MG/ML
4 INJECTION INTRAMUSCULAR; INTRAVENOUS ONCE
Status: COMPLETED | OUTPATIENT
Start: 2021-08-29 | End: 2021-08-29

## 2021-08-29 RX ORDER — SODIUM CHLORIDE, SODIUM LACTATE, POTASSIUM CHLORIDE, CALCIUM CHLORIDE 600; 310; 30; 20 MG/100ML; MG/100ML; MG/100ML; MG/100ML
1000 INJECTION, SOLUTION INTRAVENOUS ONCE
Status: COMPLETED | OUTPATIENT
Start: 2021-08-29 | End: 2021-08-29

## 2021-08-29 RX ORDER — ONDANSETRON 4 MG/1
4 TABLET, ORALLY DISINTEGRATING ORAL EVERY 6 HOURS PRN
Qty: 5 TABLET | Refills: 0 | Status: SHIPPED | OUTPATIENT
Start: 2021-08-29 | End: 2022-09-17

## 2021-08-29 RX ADMIN — SODIUM CHLORIDE, POTASSIUM CHLORIDE, SODIUM LACTATE AND CALCIUM CHLORIDE 1000 ML: 600; 310; 30; 20 INJECTION, SOLUTION INTRAVENOUS at 19:33

## 2021-08-29 RX ADMIN — ONDANSETRON 4 MG: 2 INJECTION INTRAMUSCULAR; INTRAVENOUS at 19:35

## 2021-08-29 ASSESSMENT — FIBROSIS 4 INDEX: FIB4 SCORE: 0.48

## 2021-08-30 NOTE — ED NOTES
PIV placed, labs drawn and sent. Pt medicated per MAR for nausea and LR bolus administered per MAR.

## 2021-08-30 NOTE — ED PROVIDER NOTES
ED Provider Note    CHIEF COMPLAINT  Chief Complaint   Patient presents with   • Vomiting     Pt has been vomiting since Friday night and unable to keep anything down. Pt denies abd pain.    • Pregnancy     Pt states she took 2 pregnancy tests at home that were positive but states someone told her they weren't accurate.         HPI    Primary care provider: Pcp Pt States None   History obtained from: Patient  History limited by: None     Mayra Rojas is a 26 y.o. female who presents to the ED complaining of vomiting for the past 2 days and positive home pregnancy test.  Patient states that she has had recurrent vomiting that started 2 days ago without gross blood noted.  She denies any pain except for her stomach being a little sore from all the vomiting.  She took 2 home pregnancy tests that were positive and wants to make sure.  She reports that her last menstrual period was around .  She was previously  with 3 prior miscarriages.  She denies vaginal bleeding.  She denies fever/chills/congestion/sore throat/cough/shortness of breath or difficulty breathing/diarrhea/constipation/hematochezia/melena/dysuria/hematuria/rash.  No recent travels or known ill contacts.  No suspicious oral intake.    REVIEW OF SYSTEMS  Please see HPI for pertinent positives/negatives.  All other systems reviewed and are negative.     PAST MEDICAL HISTORY  No past medical history on file.     SURGICAL HISTORY  No past surgical history on file.     SOCIAL HISTORY  Social History     Tobacco Use   • Smoking status: Never Smoker   • Smokeless tobacco: Never Used   Substance and Sexual Activity   • Alcohol use: No   • Drug use: Yes     Types: Inhaled     Comment: marijuana    • Sexual activity: Yes     Partners: Male     Birth control/protection: Condom     Comment: [facility] BTL        FAMILY HISTORY  Family History   Problem Relation Age of Onset   • Cancer Maternal Grandmother         breast   •  "Hypertension Mother         CURRENT MEDICATIONS  Home Medications     Reviewed by Nat Conde R.N. (Registered Nurse) on 08/29/21 at 1759  Med List Status: Not Addressed   Medication Last Dose Status   ibuprofen (MOTRIN) 600 MG Tab  Active   Prenatal MV-Min-Fe Fum-FA-DHA (PRENATAL 1 PO)  Active   prenatal plus vitamin (STUARTNATAL 1+1) 27-1 MG Tab tablet  Active                 ALLERGIES  Allergies   Allergen Reactions   • Cauliflower [Brassica Oleracea Italica] Anaphylaxis   • Nkda [No Known Drug Allergy]         PHYSICAL EXAM  VITAL SIGNS: /83   Pulse 78   Temp 36.9 °C (98.5 °F) (Temporal)   Resp 16   Ht 1.651 m (5' 5\")   Wt (!) 159 kg (351 lb 3.1 oz)   SpO2 95%   BMI 58.44 kg/m²  @AILIN[[id number]::@     Pulse ox interpretation: 96% I interpret this pulse ox as normal     Constitutional: Well developed, well nourished, alert in no apparent distress, nontoxic appearance    HENT: No external signs of trauma, normocephalic, mask on due to COVID-19 pandemic  Eyes: PERRL, conjunctiva without erythema, no discharge, no icterus    Neck: Soft and supple, trachea midline, no stridor, no tenderness, no LAD, no JVD, good ROM    Cardiovascular: Regular rate and rhythm, no murmurs/rubs/gallops, strong distal pulses and good perfusion    Thorax & Lungs: No respiratory distress, CTAB   Abdomen: Soft, nontender, nondistended, no guarding, no rebound, normal BS    Back: No CVAT    Extremities: No cyanosis, no edema, no gross deformity, good ROM, no tenderness, intact distal pulses with brisk cap refill    Skin: Warm, dry, no pallor/cyanosis, no rash noted    Lymphatic: No lymphadenopathy noted    Neuro: A/O times 3, no focal deficits noted    Psychiatric: Cooperative, normal mood and affect      DIAGNOSTIC STUDIES / PROCEDURES        LABS  All labs reviewed by me.     Results for orders placed or performed during the hospital encounter of 08/29/21   BETA-HCG QUALITATIVE URINE   Result Value Ref Range    Beta-Hcg " Urine Negative Negative   CBC WITH DIFFERENTIAL   Result Value Ref Range    WBC 8.5 4.8 - 10.8 K/uL    RBC 4.96 4.20 - 5.40 M/uL    Hemoglobin 14.1 12.0 - 16.0 g/dL    Hematocrit 43.4 37.0 - 47.0 %    MCV 87.5 81.4 - 97.8 fL    MCH 28.4 27.0 - 33.0 pg    MCHC 32.5 (L) 33.6 - 35.0 g/dL    RDW 41.6 35.9 - 50.0 fL    Platelet Count 271 164 - 446 K/uL    MPV 9.5 9.0 - 12.9 fL    Neutrophils-Polys 66.40 44.00 - 72.00 %    Lymphocytes 25.30 22.00 - 41.00 %    Monocytes 4.90 0.00 - 13.40 %    Eosinophils 2.70 0.00 - 6.90 %    Basophils 0.50 0.00 - 1.80 %    Immature Granulocytes 0.20 0.00 - 0.90 %    Nucleated RBC 0.00 /100 WBC    Neutrophils (Absolute) 5.66 2.00 - 7.15 K/uL    Lymphs (Absolute) 2.16 1.00 - 4.80 K/uL    Monos (Absolute) 0.42 0.00 - 0.85 K/uL    Eos (Absolute) 0.23 0.00 - 0.51 K/uL    Baso (Absolute) 0.04 0.00 - 0.12 K/uL    Immature Granulocytes (abs) 0.02 0.00 - 0.11 K/uL    NRBC (Absolute) 0.00 K/uL   COMP METABOLIC PANEL   Result Value Ref Range    Sodium 140 135 - 145 mmol/L    Potassium 3.6 3.6 - 5.5 mmol/L    Chloride 106 96 - 112 mmol/L    Co2 23 20 - 33 mmol/L    Anion Gap 11.0 7.0 - 16.0    Glucose 100 (H) 65 - 99 mg/dL    Bun 9 8 - 22 mg/dL    Creatinine 0.68 0.50 - 1.40 mg/dL    Calcium 9.2 8.5 - 10.5 mg/dL    AST(SGOT) 24 12 - 45 U/L    ALT(SGPT) 35 2 - 50 U/L    Alkaline Phosphatase 97 30 - 99 U/L    Total Bilirubin 0.2 0.1 - 1.5 mg/dL    Albumin 3.8 3.2 - 4.9 g/dL    Total Protein 7.1 6.0 - 8.2 g/dL    Globulin 3.3 1.9 - 3.5 g/dL    A-G Ratio 1.2 g/dL   URINALYSIS (UA)    Specimen: Urine, Clean Catch   Result Value Ref Range    Color Yellow     Character Clear     Specific Gravity 1.018 <1.035    Ph 5.5 5.0 - 8.0    Glucose Negative Negative mg/dL    Ketones Negative Negative mg/dL    Protein Negative Negative mg/dL    Bilirubin Negative Negative    Urobilinogen, Urine 0.2 Negative    Nitrite Negative Negative    Leukocyte Esterase Trace (A) Negative    Occult Blood Negative Negative    Micro  Urine Req Microscopic    LIPASE   Result Value Ref Range    Lipase 25 11 - 82 U/L   ESTIMATED GFR   Result Value Ref Range    GFR If African American >60 >60 mL/min/1.73 m 2    GFR If Non African American >60 >60 mL/min/1.73 m 2   URINE MICROSCOPIC (W/UA)   Result Value Ref Range    WBC 2-5 /hpf    RBC 0-2 /hpf    Bacteria Negative None /hpf    Epithelial Cells Negative /hpf    Hyaline Cast 0-2 /lpf        RADIOLOGY  The radiologist's interpretation of all radiological studies have been reviewed by me.     No orders to display          COURSE & MEDICAL DECISION MAKING  Nursing notes, VS, PMSFHx reviewed in chart.     Review of past medical records shows the patient was last seen in this ED May 13, 2021 for flulike symptoms.      Differential diagnoses considered include but are not limited to: Appendicitis, AAA, bowel perforation/obstruction, ileus, cholecystitis/ascending cholangitis, gallstone/biliary colic, pancreatitis, PUD, gastritis, GERD, muscle strain, pregnancy/ectopic      History and physical exam as above. Patient's pregnancy test actually returned negative. The rest of her labs are fairly unremarkable. She was treated with IV fluid and Zofran and tolerated oral fluids without further vomiting. I discussed the findings with the patient. This is a pleasant well-appearing patient in no acute distress and nontoxic in appearance and clinically stable during her ED stay. No signs of acute surgical abdomen. I discussed with patient worrisome signs and symptoms and return to ED precautions and outpatient follow-up. She will be prescribed Zofran to use as needed. She was also noted to have elevated blood pressure reading for which she can follow-up on outpatient basis for further management. Patient verbalized understanding and agreed with plan of care with no further questions or concerns.      HYDRATION: Based on the patient's presentation of Acute Vomiting the patient was given IV fluids. IV Hydration was  used because oral hydration was not as rapid as required. Upon recheck following hydration, the patient was improved.      The patient is referred to a primary physician for blood pressure management, diabetic screening, and for all other preventative health concerns.       FINAL IMPRESSION  1. Non-intractable vomiting with nausea, unspecified vomiting type Acute          DISPOSITION  Patient will be discharged home in stable condition.       FOLLOW UP  Please follow-up with your doctor    Call in 1 day      [facility], Emergency Dept  [facility] [facility] [address] [phone]    If symptoms worsen         OUTPATIENT MEDICATIONS  Discharge Medication List as of 8/29/2021  9:33 PM      START taking these medications    Details   ondansetron (ZOFRAN ODT) 4 MG TABLET DISPERSIBLE Take 1 Tablet by mouth every 6 hours as needed., Disp-5 Tablet, R-0, Print Rx Paper                Electronically signed by: Toy Ponce D.O., 8/29/2021 7:16 PM      Portions of this record were made with voice recognition software.  Despite my review, spelling/grammar/context errors may still remain.  Interpretation of this chart should be taken in this context.

## 2021-08-30 NOTE — ED TRIAGE NOTES
"Chief Complaint   Patient presents with   • Vomiting     Pt has been vomiting since Friday night and unable to keep anything down. Pt denies abd pain.    • Pregnancy     Pt states she took 2 pregnancy tests at home that were positive but states someone told her they weren't accurate.      /93   Pulse 92   Temp 36.6 °C (97.9 °F) (Temporal)   Resp 16   Ht 1.651 m (5' 5\")   Wt (!) 159 kg (351 lb 3.1 oz)   SpO2 96%   BMI 58.44 kg/m²     Patient arrived to ED for the above complaint. Triage process explained to patient. Patient placed in lobby and told to notify staff of any changes.   "

## 2021-08-30 NOTE — ED NOTES
"Pt discharged home. Pt is A/O x 4, ambulatory with a steady gait. IV discontinued and gauze placed, pt in possession of belongings. Pt provided discharge education and information pertaining to medications and follow up appointments. Provided a zofran script, pt verbalized understanding. Educated to follow up with PCP in a day, pt aware. Discussed signs and symptoms to return to the ER, patient verbalized understanding. Pt received copy of discharge instructions and verbalized understanding.     /83   Pulse 78   Temp 36.9 °C (98.5 °F) (Temporal)   Resp 16   Ht 1.651 m (5' 5\")   Wt (!) 159 kg (351 lb 3.1 oz)   SpO2 95%   BMI 58.44 kg/m²     "

## 2022-09-16 PROCEDURE — 99285 EMERGENCY DEPT VISIT HI MDM: CPT

## 2022-09-16 ASSESSMENT — FIBROSIS 4 INDEX: FIB4 SCORE: 0.43

## 2022-09-17 ENCOUNTER — HOSPITAL ENCOUNTER (EMERGENCY)
Facility: MEDICAL CENTER | Age: 27
End: 2022-09-17
Attending: EMERGENCY MEDICINE
Payer: MEDICAID

## 2022-09-17 VITALS
TEMPERATURE: 97.3 F | OXYGEN SATURATION: 97 % | RESPIRATION RATE: 16 BRPM | WEIGHT: 293 LBS | DIASTOLIC BLOOD PRESSURE: 80 MMHG | SYSTOLIC BLOOD PRESSURE: 117 MMHG | BODY MASS INDEX: 58.11 KG/M2 | HEART RATE: 74 BPM

## 2022-09-17 DIAGNOSIS — R52 BODY ACHES: ICD-10-CM

## 2022-09-17 DIAGNOSIS — R68.83 CHILLS: ICD-10-CM

## 2022-09-17 DIAGNOSIS — B34.9 ACUTE VIRAL SYNDROME: ICD-10-CM

## 2022-09-17 DIAGNOSIS — N39.0 URINARY TRACT INFECTION WITHOUT HEMATURIA, SITE UNSPECIFIED: ICD-10-CM

## 2022-09-17 DIAGNOSIS — R11.2 NON-INTRACTABLE VOMITING WITH NAUSEA, UNSPECIFIED VOMITING TYPE: ICD-10-CM

## 2022-09-17 LAB
ALBUMIN SERPL BCP-MCNC: 3.8 G/DL (ref 3.2–4.9)
ALBUMIN/GLOB SERPL: 1.3 G/DL
ALP SERPL-CCNC: 95 U/L (ref 30–99)
ALT SERPL-CCNC: 34 U/L (ref 2–50)
ANION GAP SERPL CALC-SCNC: 10 MMOL/L (ref 7–16)
APPEARANCE UR: ABNORMAL
AST SERPL-CCNC: 21 U/L (ref 12–45)
BACTERIA #/AREA URNS HPF: ABNORMAL /HPF
BASOPHILS # BLD AUTO: 0.7 % (ref 0–1.8)
BASOPHILS # BLD: 0.06 K/UL (ref 0–0.12)
BILIRUB SERPL-MCNC: 0.2 MG/DL (ref 0.1–1.5)
BILIRUB UR QL STRIP.AUTO: NEGATIVE
BUN SERPL-MCNC: 9 MG/DL (ref 8–22)
CALCIUM SERPL-MCNC: 9 MG/DL (ref 8.5–10.5)
CAOX CRY #/AREA URNS HPF: ABNORMAL /HPF
CHLORIDE SERPL-SCNC: 106 MMOL/L (ref 96–112)
CO2 SERPL-SCNC: 23 MMOL/L (ref 20–33)
COLOR UR: YELLOW
CREAT SERPL-MCNC: 0.68 MG/DL (ref 0.5–1.4)
EOSINOPHIL # BLD AUTO: 0.32 K/UL (ref 0–0.51)
EOSINOPHIL NFR BLD: 3.8 % (ref 0–6.9)
EPI CELLS #/AREA URNS HPF: NEGATIVE /HPF
ERYTHROCYTE [DISTWIDTH] IN BLOOD BY AUTOMATED COUNT: 42 FL (ref 35.9–50)
FLUAV RNA SPEC QL NAA+PROBE: NEGATIVE
FLUBV RNA SPEC QL NAA+PROBE: NEGATIVE
GFR SERPLBLD CREATININE-BSD FMLA CKD-EPI: 121 ML/MIN/1.73 M 2
GLOBULIN SER CALC-MCNC: 3 G/DL (ref 1.9–3.5)
GLUCOSE SERPL-MCNC: 118 MG/DL (ref 65–99)
GLUCOSE UR STRIP.AUTO-MCNC: NEGATIVE MG/DL
HCG SERPL QL: NEGATIVE
HCT VFR BLD AUTO: 42.9 % (ref 37–47)
HGB BLD-MCNC: 14.6 G/DL (ref 12–16)
HYALINE CASTS #/AREA URNS LPF: ABNORMAL /LPF
IMM GRANULOCYTES # BLD AUTO: 0.03 K/UL (ref 0–0.11)
IMM GRANULOCYTES NFR BLD AUTO: 0.4 % (ref 0–0.9)
KETONES UR STRIP.AUTO-MCNC: ABNORMAL MG/DL
LEUKOCYTE ESTERASE UR QL STRIP.AUTO: ABNORMAL
LIPASE SERPL-CCNC: 27 U/L (ref 11–82)
LYMPHOCYTES # BLD AUTO: 2.58 K/UL (ref 1–4.8)
LYMPHOCYTES NFR BLD: 31 % (ref 22–41)
MCH RBC QN AUTO: 29.3 PG (ref 27–33)
MCHC RBC AUTO-ENTMCNC: 34 G/DL (ref 33.6–35)
MCV RBC AUTO: 86.1 FL (ref 81.4–97.8)
MICRO URNS: ABNORMAL
MONOCYTES # BLD AUTO: 0.49 K/UL (ref 0–0.85)
MONOCYTES NFR BLD AUTO: 5.9 % (ref 0–13.4)
NEUTROPHILS # BLD AUTO: 4.85 K/UL (ref 2–7.15)
NEUTROPHILS NFR BLD: 58.2 % (ref 44–72)
NITRITE UR QL STRIP.AUTO: NEGATIVE
NRBC # BLD AUTO: 0 K/UL
NRBC BLD-RTO: 0 /100 WBC
PH UR STRIP.AUTO: 5.5 [PH] (ref 5–8)
PLATELET # BLD AUTO: 243 K/UL (ref 164–446)
PMV BLD AUTO: 9.5 FL (ref 9–12.9)
POTASSIUM SERPL-SCNC: 3.5 MMOL/L (ref 3.6–5.5)
PROT SERPL-MCNC: 6.8 G/DL (ref 6–8.2)
PROT UR QL STRIP: NEGATIVE MG/DL
RBC # BLD AUTO: 4.98 M/UL (ref 4.2–5.4)
RBC # URNS HPF: ABNORMAL /HPF
RBC UR QL AUTO: NEGATIVE
RSV RNA SPEC QL NAA+PROBE: NEGATIVE
SARS-COV-2 RNA RESP QL NAA+PROBE: NOTDETECTED
SODIUM SERPL-SCNC: 139 MMOL/L (ref 135–145)
SP GR UR STRIP.AUTO: 1.03
SPECIMEN SOURCE: NORMAL
UROBILINOGEN UR STRIP.AUTO-MCNC: 0.2 MG/DL
WBC # BLD AUTO: 8.3 K/UL (ref 4.8–10.8)
WBC #/AREA URNS HPF: ABNORMAL /HPF

## 2022-09-17 PROCEDURE — 83690 ASSAY OF LIPASE: CPT

## 2022-09-17 PROCEDURE — 700102 HCHG RX REV CODE 250 W/ 637 OVERRIDE(OP): Performed by: EMERGENCY MEDICINE

## 2022-09-17 PROCEDURE — 84703 CHORIONIC GONADOTROPIN ASSAY: CPT

## 2022-09-17 PROCEDURE — 700111 HCHG RX REV CODE 636 W/ 250 OVERRIDE (IP): Performed by: EMERGENCY MEDICINE

## 2022-09-17 PROCEDURE — 81001 URINALYSIS AUTO W/SCOPE: CPT

## 2022-09-17 PROCEDURE — 0241U HCHG SARS-COV-2 COVID-19 NFCT DS RESP RNA 4 TRGT MIC: CPT

## 2022-09-17 PROCEDURE — C9803 HOPD COVID-19 SPEC COLLECT: HCPCS | Performed by: EMERGENCY MEDICINE

## 2022-09-17 PROCEDURE — 36415 COLL VENOUS BLD VENIPUNCTURE: CPT

## 2022-09-17 PROCEDURE — 80053 COMPREHEN METABOLIC PANEL: CPT

## 2022-09-17 PROCEDURE — A9270 NON-COVERED ITEM OR SERVICE: HCPCS | Performed by: EMERGENCY MEDICINE

## 2022-09-17 PROCEDURE — 85025 COMPLETE CBC W/AUTO DIFF WBC: CPT

## 2022-09-17 PROCEDURE — 96374 THER/PROPH/DIAG INJ IV PUSH: CPT

## 2022-09-17 PROCEDURE — 700105 HCHG RX REV CODE 258: Performed by: EMERGENCY MEDICINE

## 2022-09-17 RX ORDER — ACETAMINOPHEN 500 MG
1000 TABLET ORAL ONCE
Status: COMPLETED | OUTPATIENT
Start: 2022-09-17 | End: 2022-09-17

## 2022-09-17 RX ORDER — SODIUM CHLORIDE 9 MG/ML
1000 INJECTION, SOLUTION INTRAVENOUS ONCE
Status: COMPLETED | OUTPATIENT
Start: 2022-09-17 | End: 2022-09-17

## 2022-09-17 RX ORDER — ONDANSETRON 2 MG/ML
4 INJECTION INTRAMUSCULAR; INTRAVENOUS ONCE
Status: COMPLETED | OUTPATIENT
Start: 2022-09-17 | End: 2022-09-17

## 2022-09-17 RX ORDER — CEPHALEXIN 500 MG/1
500 CAPSULE ORAL 2 TIMES DAILY
Qty: 10 CAPSULE | Refills: 0 | Status: SHIPPED | OUTPATIENT
Start: 2022-09-17 | End: 2022-09-22

## 2022-09-17 RX ORDER — DICYCLOMINE HCL 20 MG
20 TABLET ORAL ONCE
Status: COMPLETED | OUTPATIENT
Start: 2022-09-17 | End: 2022-09-17

## 2022-09-17 RX ORDER — ONDANSETRON 4 MG/1
4 TABLET, ORALLY DISINTEGRATING ORAL EVERY 6 HOURS PRN
Qty: 10 TABLET | Refills: 0 | Status: ON HOLD | OUTPATIENT
Start: 2022-09-17 | End: 2023-12-14

## 2022-09-17 RX ORDER — CEPHALEXIN 500 MG/1
500 CAPSULE ORAL ONCE
Status: DISCONTINUED | OUTPATIENT
Start: 2022-09-17 | End: 2022-09-17 | Stop reason: HOSPADM

## 2022-09-17 RX ADMIN — ONDANSETRON 4 MG: 2 INJECTION INTRAMUSCULAR; INTRAVENOUS at 01:11

## 2022-09-17 RX ADMIN — ACETAMINOPHEN 1000 MG: 500 TABLET ORAL at 01:11

## 2022-09-17 RX ADMIN — DICYCLOMINE HYDROCHLORIDE 20 MG: 20 TABLET ORAL at 01:15

## 2022-09-17 RX ADMIN — SODIUM CHLORIDE 1000 ML: 9 INJECTION, SOLUTION INTRAVENOUS at 01:15

## 2022-09-17 NOTE — ED PROVIDER NOTES
ED Provider Note    CHIEF COMPLAINT  Chief Complaint   Patient presents with    Flu Like Symptoms     X 2 wks. HA. N/V. Body aches.     Abdominal Cramping     Intermittent cramping. Pregnancy status unknown. LMP aug 24.        HPI  Patient is a 27-year-old female who presents emergency room for concerns regarding abdominal cramping and persistent nausea and vomiting in addition to body aches chills and low-grade headache.  Patient states that she has been having some intermittent body aches and chills over the last several weeks and does report having some intermittent abdominal cramping.  This has not been associated with any dysuria, frequent urination, no diarrheal illness or vaginal spotting.  She has had no vaginal pain otherwise reports that her overall symptomology including the low-grade headaches persistent nausea progression to nonbilious nonbloody emesis over the last several days prompted her to come in for further evaluation.  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  No skin lesions, no neck pain, no vision changes or productive cough.    PPE Note: I personally donned full PPE for all patient encounters during this visit, including being clean-shaven with an N95 respirator mask, gloves, and goggles.     REVIEW OF SYSTEMS  See HPI for further details. All other systems are negative.     PAST MEDICAL HISTORY       SOCIAL HISTORY  Social History     Tobacco Use    Smoking status: Never    Smokeless tobacco: Never   Substance and Sexual Activity    Alcohol use: No    Drug use: Yes     Types: Inhaled     Comment: marijuana 2017    Sexual activity: Yes     Partners: Male     Birth control/protection: Condom     Comment: Wants PP BTL       SURGICAL HISTORY  patient denies any surgical history    CURRENT MEDICATIONS  Home Medications    **Home medications have not yet been reviewed for this encounter**          ALLERGIES  Allergies   Allergen Reactions    Cauliflower [Brassica Oleracea Italica] Anaphylaxis    Nkda [No Known Drug Allergy]        PHYSICAL EXAM  VITAL SIGNS: /84   Pulse 79   Temp 36.2 °C (97.1 °F) (Temporal)   Resp 16   Wt (!) 158 kg (349 lb 3.3 oz)   LMP 08/24/2022   SpO2 97%   BMI 58.11 kg/m²   Pulse ox interpretation: I interpret this pulse ox as normal.  Genl: F sitting in gurney uncomfortably, speaking clearly, appears in mild distress   Head: NC/AT   ENT: Mucous membranes slightly dry, posterior pharynx clear, uvula midline, nares patent bilaterally   Eyes: Normal sclera, pupils equal round reactive to light  Neck: Supple, FROM, no LAD appreciated   Pulmonary: Lungs are clear to auscultation bilaterally  Chest: No TTP  CV:  RRR, no murmur appreciated, pulses 2+ in both upper and lower extremities,  Abdomen: soft, epigastric discomfort, no true Harmon sign, no McBurney's point tenderness, mild suprapubic discomfort that is inconsistent on reexamination.  ND; no rebound/guarding, no masses palpated, no HSM   : no CVA tenderness   Musculoskeletal: Pain free ROM of the neck. Moving upper and lower extremities and spontaneous in coordinated fashion  Neuro: A&Ox4 (person, place, time, situation), speech fluent, gait steady, no focal deficits appreciated, No cerebellar signs. Sensation is grossly intact in the distal upper and lower extremities.  5/5 strength in  and dorsiflexion/plantar flexion of the ankles  Psych: Patient has an appropriate affect and behavior  Skin: No rash or lesions.  No pallor or jaundice.  No cyanosis.  Warm and dry.     DIAGNOSTIC STUDIES / PROCEDURES    LABS  Labs Reviewed   COMP METABOLIC PANEL - Abnormal; Notable for the following components:       Result Value    Potassium 3.5 (*)     Glucose 118 (*)     All other components within normal limits   URINALYSIS - Abnormal; Notable for the following components:    Character Cloudy (*)     Ketones Trace (*)      Leukocyte Esterase Small (*)     All other components within normal limits   URINE MICROSCOPIC (W/UA) - Abnormal; Notable for the following components:    WBC  (*)     Bacteria Few (*)     All other components within normal limits   CBC WITH DIFFERENTIAL   LIPASE   HCG QUAL SERUM   COV-2, FLU A/B, AND RSV BY PCR (CEPHEID)   ESTIMATED GFR     RADIOLOGY  No orders to display     COURSE & MEDICAL DECISION MAKING  Pertinent Labs & Imaging studies reviewed. (See chart for details)    DDX:  Viral syndrome, dehydration, peptic ulcer disease, esophagitis, gastritis, colitis, UTI, pregnancy, ectopic, pyelitis, nephrolithiasis, pyelonephritis unlikely, appendicitis    MDM  Initial evaluation at 1233:  Patient presents emergency room for symptoms as described above.  The patient was nontoxic, with stable vital signs and describe having multiple weeks of intermittent symptoms consistent with a viral illness.  She does not have localizing abdominal pain, she remains afebrile, she has no true flank discomfort but does have some suprapubic discomfort.  She does not have any acute respiratory symptoms, with reassuring vital signs and I believe advanced medical imaging x-ray is currently indicated.  Lab work obtained shows negative pregnancy test, no leukocytosis or anemia, no evidence of LFT obstructive pathologies or pancreatitis, COVID tests are negative, pregnancy test is negative, at this time urinalysis was somewhat delayed after she had received some fluids because she was vomiting.  Subsequently also received some Tylenol, Zofran and Bentyl and felt substantially improved.  Urinalysis came back showing substantial amounts of WBCs with bacteria and leukocyte esterase consistent with early UTI.  She will be treated with antibiotics, she could have concurrent viral illness causing her body aches and discomfort and is counseled regarding the possibility of coinfection.  She does not have clinical signs of  pyelonephritis, she is not septic, she can be discharged home with routine outpatient follow-up.    HYDRATION: Based on the patient's presentation of Acute Vomiting and Dehydration the patient was given IV fluids. IV Hydration was used because oral hydration was not adequate alone. Upon recheck following hydration, the patient was improved.    FINAL IMPRESSION  Visit Diagnoses     ICD-10-CM   1. Urinary tract infection without hematuria, site unspecified  N39.0   2. Body aches  R52   3. Chills  R68.83     Electronically signed by: Tacho Valle M.D., 9/17/2022 12:34 AM

## 2023-06-19 ENCOUNTER — GYNECOLOGY VISIT (OUTPATIENT)
Dept: OBGYN | Facility: CLINIC | Age: 28
End: 2023-06-19
Payer: MEDICAID

## 2023-06-19 VITALS
BODY MASS INDEX: 48.82 KG/M2 | WEIGHT: 293 LBS | HEIGHT: 65 IN | SYSTOLIC BLOOD PRESSURE: 100 MMHG | DIASTOLIC BLOOD PRESSURE: 70 MMHG

## 2023-06-19 DIAGNOSIS — N93.8 DUB (DYSFUNCTIONAL UTERINE BLEEDING): ICD-10-CM

## 2023-06-19 DIAGNOSIS — O21.9 NAUSEA AND VOMITING DURING PREGNANCY: ICD-10-CM

## 2023-06-19 DIAGNOSIS — Z34.90 PREGNANCY, UNSPECIFIED GESTATIONAL AGE: ICD-10-CM

## 2023-06-19 LAB
POCT INT CON NEG: NEGATIVE
POCT INT CON POS: POSITIVE
POCT URINE PREGNANCY TEST: POSITIVE

## 2023-06-19 PROCEDURE — 3074F SYST BP LT 130 MM HG: CPT | Performed by: OBSTETRICS & GYNECOLOGY

## 2023-06-19 PROCEDURE — 81025 URINE PREGNANCY TEST: CPT | Performed by: OBSTETRICS & GYNECOLOGY

## 2023-06-19 PROCEDURE — 76830 TRANSVAGINAL US NON-OB: CPT | Performed by: OBSTETRICS & GYNECOLOGY

## 2023-06-19 PROCEDURE — 99203 OFFICE O/P NEW LOW 30 MIN: CPT | Mod: 25 | Performed by: OBSTETRICS & GYNECOLOGY

## 2023-06-19 PROCEDURE — 3078F DIAST BP <80 MM HG: CPT | Performed by: OBSTETRICS & GYNECOLOGY

## 2023-06-19 RX ORDER — ONDANSETRON 4 MG/1
4 TABLET, FILM COATED ORAL EVERY 4 HOURS PRN
Qty: 20 TABLET | Refills: 0 | Status: SHIPPED | OUTPATIENT
Start: 2023-06-19 | End: 2023-07-09

## 2023-06-19 ASSESSMENT — ENCOUNTER SYMPTOMS
MUSCULOSKELETAL NEGATIVE: 1
CARDIOVASCULAR NEGATIVE: 1
NEUROLOGICAL NEGATIVE: 1
CONSTITUTIONAL NEGATIVE: 1
GASTROINTESTINAL NEGATIVE: 1
EYES NEGATIVE: 1
PSYCHIATRIC NEGATIVE: 1
RESPIRATORY NEGATIVE: 1

## 2023-06-19 ASSESSMENT — FIBROSIS 4 INDEX: FIB4 SCORE: 0.44

## 2023-06-19 NOTE — PROGRESS NOTES
"GYN PROBLEM VISIT    CC:  Gynecologic Exam ()       HPI: Patient is a 28 y.o.  who presents with missed menses and a positive home pregnancy test. She reports that she had a positive home pregnancy test in May. She is having a lot of nausea and is requesting prescription nausea medication. She was also advised to take unisom/b6 at night to help with the nausea. She has not had any spotting or cramping.        ROS:   Review of Systems   Constitutional: Negative.    HENT: Negative.     Eyes: Negative.    Respiratory: Negative.     Cardiovascular: Negative.    Gastrointestinal: Negative.    Genitourinary: Negative.    Musculoskeletal: Negative.    Skin: Negative.    Neurological: Negative.    Endo/Heme/Allergies: Negative.    Psychiatric/Behavioral: Negative.           PFSH:  I personally reviewed the past medical and surgical histories.     Social History     Tobacco Use    Smoking status: Never    Smokeless tobacco: Never   Substance Use Topics    Alcohol use: No    Drug use: Yes     Types: Inhaled     Comment: marijuana 2017       Social History     Substance and Sexual Activity   Sexual Activity Yes    Partners: Male    Birth control/protection: Condom        ALLERGIES / REACTIONS:  Allergies   Allergen Reactions    Cauliflower [Brassica Oleracea Italica] Anaphylaxis    Nkda [No Known Drug Allergy]                            PHYSICAL EXAMINATION:  Vital Signs:   Vitals:    23 1117   BP: 100/70   Weight: (!) 351 lb   Height: 5' 5\"     Body mass index is 58.41 kg/m².    Physical Exam  Vitals reviewed. Exam conducted with a chaperone present.   Constitutional:       Appearance: She is obese.   HENT:      Head: Normocephalic.   Eyes:      Extraocular Movements: Extraocular movements intact.      Pupils: Pupils are equal, round, and reactive to light.   Cardiovascular:      Rate and Rhythm: Normal rate.   Pulmonary:      Effort: Pulmonary effort is normal.   Abdominal:      General: Abdomen is flat.      " Palpations: Abdomen is soft.   Genitourinary:     General: Normal vulva.      Exam position: Lithotomy position.      Comments: Transvaginal ultrasound performed. A single live intrauterine pregnancy is identified. The CRL measures 2.10cm which is inconsistent with her period making her 8w5d today with an EDC of 2024. FHT are 169. She has no obvious adnexal masses.   Musculoskeletal:         General: Normal range of motion.      Cervical back: Normal range of motion.   Skin:     General: Skin is warm and dry.   Neurological:      General: No focal deficit present.      Mental Status: She is alert and oriented to person, place, and time.   Psychiatric:         Mood and Affect: Mood normal.         Behavior: Behavior normal.          ASSESSMENT AND PLAN:  28 y.o.      1. DUB (dysfunctional uterine bleeding)  - POCT Pregnancy    2. Pregnancy, unspecified gestational age  - GLUCOSE 1HR GESTATIONAL; Future  - PREG CNTR PRENATAL PN; Future  - US-OB 2ND 3RD TRI COMPLETE; Future  - URINE DRUG SCREEN W/CONF (AR); Future    3. Nausea and vomiting during pregnancy  - ondansetron (ZOFRAN) 4 MG Tab tablet; Take 1 Tablet by mouth every four hours as needed for Nausea/Vomiting for up to 20 days.  Dispense: 20 Tablet; Refill: 0    Plan:  -Patient was alerted of ultrasound findings and notified that her EDC is 2024  -Counseled on unisom/B6 for nausea, zofran was sent in case of severe vomiting  -Initial prenatal labs were ordered today including an early one hour  -She will need a pap and cultures at the next visit  -Miscarriage and pregnancy precautions reviewed  -Return to clinic in 4 weeks     Zeb Torre M.D.  Obstetrics & Gynecology   [id number]  12:27 PM

## 2023-07-18 ENCOUNTER — APPOINTMENT (OUTPATIENT)
Dept: RADIOLOGY | Facility: MEDICAL CENTER | Age: 28
End: 2023-07-18
Attending: EMERGENCY MEDICINE
Payer: MEDICAID

## 2023-07-18 ENCOUNTER — HOSPITAL ENCOUNTER (EMERGENCY)
Facility: MEDICAL CENTER | Age: 28
End: 2023-07-18
Attending: EMERGENCY MEDICINE
Payer: MEDICAID

## 2023-07-18 VITALS
DIASTOLIC BLOOD PRESSURE: 83 MMHG | RESPIRATION RATE: 18 BRPM | SYSTOLIC BLOOD PRESSURE: 118 MMHG | WEIGHT: 293 LBS | HEIGHT: 65 IN | BODY MASS INDEX: 48.82 KG/M2 | TEMPERATURE: 97.5 F | HEART RATE: 81 BPM | OXYGEN SATURATION: 95 %

## 2023-07-18 DIAGNOSIS — N30.00 ACUTE CYSTITIS WITHOUT HEMATURIA: ICD-10-CM

## 2023-07-18 DIAGNOSIS — K02.9 PAIN DUE TO DENTAL CARIES: ICD-10-CM

## 2023-07-18 DIAGNOSIS — O20.0 THREATENED MISCARRIAGE: ICD-10-CM

## 2023-07-18 LAB
ALBUMIN SERPL BCP-MCNC: 3.5 G/DL (ref 3.2–4.9)
ALBUMIN/GLOB SERPL: 1.1 G/DL
ALP SERPL-CCNC: 83 U/L (ref 30–99)
ALT SERPL-CCNC: 30 U/L (ref 2–50)
ANION GAP SERPL CALC-SCNC: 14 MMOL/L (ref 7–16)
APPEARANCE UR: CLEAR
AST SERPL-CCNC: 16 U/L (ref 12–45)
B-HCG SERPL-ACNC: ABNORMAL MIU/ML (ref 0–5)
BACTERIA #/AREA URNS HPF: ABNORMAL /HPF
BASOPHILS # BLD AUTO: 0.5 % (ref 0–1.8)
BASOPHILS # BLD: 0.03 K/UL (ref 0–0.12)
BILIRUB SERPL-MCNC: 0.2 MG/DL (ref 0.1–1.5)
BILIRUB UR QL STRIP.AUTO: NEGATIVE
BUN SERPL-MCNC: 7 MG/DL (ref 8–22)
CALCIUM ALBUM COR SERPL-MCNC: 10 MG/DL (ref 8.5–10.5)
CALCIUM SERPL-MCNC: 9.6 MG/DL (ref 8.5–10.5)
CHLORIDE SERPL-SCNC: 105 MMOL/L (ref 96–112)
CO2 SERPL-SCNC: 21 MMOL/L (ref 20–33)
COLOR UR: YELLOW
CREAT SERPL-MCNC: 0.73 MG/DL (ref 0.5–1.4)
EOSINOPHIL # BLD AUTO: 0.17 K/UL (ref 0–0.51)
EOSINOPHIL NFR BLD: 2.6 % (ref 0–6.9)
EPI CELLS #/AREA URNS HPF: ABNORMAL /HPF
ERYTHROCYTE [DISTWIDTH] IN BLOOD BY AUTOMATED COUNT: 43.4 FL (ref 35.9–50)
GFR SERPLBLD CREATININE-BSD FMLA CKD-EPI: 113 ML/MIN/1.73 M 2
GLOBULIN SER CALC-MCNC: 3.1 G/DL (ref 1.9–3.5)
GLUCOSE SERPL-MCNC: 82 MG/DL (ref 65–99)
GLUCOSE UR STRIP.AUTO-MCNC: NEGATIVE MG/DL
HCT VFR BLD AUTO: 41.2 % (ref 37–47)
HGB BLD-MCNC: 13.3 G/DL (ref 12–16)
HYALINE CASTS #/AREA URNS LPF: ABNORMAL /LPF
IMM GRANULOCYTES # BLD AUTO: 0.03 K/UL (ref 0–0.11)
IMM GRANULOCYTES NFR BLD AUTO: 0.5 % (ref 0–0.9)
KETONES UR STRIP.AUTO-MCNC: NEGATIVE MG/DL
LEUKOCYTE ESTERASE UR QL STRIP.AUTO: ABNORMAL
LIPASE SERPL-CCNC: 27 U/L (ref 11–82)
LYMPHOCYTES # BLD AUTO: 1.99 K/UL (ref 1–4.8)
LYMPHOCYTES NFR BLD: 30.8 % (ref 22–41)
MCH RBC QN AUTO: 28.2 PG (ref 27–33)
MCHC RBC AUTO-ENTMCNC: 32.3 G/DL (ref 32.2–35.5)
MCV RBC AUTO: 87.5 FL (ref 81.4–97.8)
MICRO URNS: ABNORMAL
MONOCYTES # BLD AUTO: 0.41 K/UL (ref 0–0.85)
MONOCYTES NFR BLD AUTO: 6.3 % (ref 0–13.4)
NEUTROPHILS # BLD AUTO: 3.83 K/UL (ref 1.82–7.42)
NEUTROPHILS NFR BLD: 59.3 % (ref 44–72)
NITRITE UR QL STRIP.AUTO: NEGATIVE
NRBC # BLD AUTO: 0 K/UL
NRBC BLD-RTO: 0 /100 WBC (ref 0–0.2)
PH UR STRIP.AUTO: 5.5 [PH] (ref 5–8)
PLATELET # BLD AUTO: 257 K/UL (ref 164–446)
PMV BLD AUTO: 9.6 FL (ref 9–12.9)
POTASSIUM SERPL-SCNC: 3.8 MMOL/L (ref 3.6–5.5)
PROT SERPL-MCNC: 6.6 G/DL (ref 6–8.2)
PROT UR QL STRIP: NEGATIVE MG/DL
RBC # BLD AUTO: 4.71 M/UL (ref 4.2–5.4)
RBC # URNS HPF: ABNORMAL /HPF
RBC UR QL AUTO: NEGATIVE
SODIUM SERPL-SCNC: 140 MMOL/L (ref 135–145)
SP GR UR STRIP.AUTO: 1.02
UROBILINOGEN UR STRIP.AUTO-MCNC: 0.2 MG/DL
WBC # BLD AUTO: 6.5 K/UL (ref 4.8–10.8)
WBC #/AREA URNS HPF: ABNORMAL /HPF

## 2023-07-18 PROCEDURE — 81001 URINALYSIS AUTO W/SCOPE: CPT

## 2023-07-18 PROCEDURE — A9270 NON-COVERED ITEM OR SERVICE: HCPCS | Mod: UD | Performed by: EMERGENCY MEDICINE

## 2023-07-18 PROCEDURE — 80053 COMPREHEN METABOLIC PANEL: CPT

## 2023-07-18 PROCEDURE — 700102 HCHG RX REV CODE 250 W/ 637 OVERRIDE(OP): Mod: UD | Performed by: EMERGENCY MEDICINE

## 2023-07-18 PROCEDURE — 83690 ASSAY OF LIPASE: CPT

## 2023-07-18 PROCEDURE — 36415 COLL VENOUS BLD VENIPUNCTURE: CPT

## 2023-07-18 PROCEDURE — 76815 OB US LIMITED FETUS(S): CPT

## 2023-07-18 PROCEDURE — 85025 COMPLETE CBC W/AUTO DIFF WBC: CPT

## 2023-07-18 PROCEDURE — 87086 URINE CULTURE/COLONY COUNT: CPT

## 2023-07-18 PROCEDURE — 84702 CHORIONIC GONADOTROPIN TEST: CPT

## 2023-07-18 PROCEDURE — 99284 EMERGENCY DEPT VISIT MOD MDM: CPT

## 2023-07-18 RX ORDER — AMOXICILLIN AND CLAVULANATE POTASSIUM 875; 125 MG/1; MG/1
1 TABLET, FILM COATED ORAL ONCE
Status: COMPLETED | OUTPATIENT
Start: 2023-07-18 | End: 2023-07-18

## 2023-07-18 RX ORDER — AMOXICILLIN AND CLAVULANATE POTASSIUM 875; 125 MG/1; MG/1
1 TABLET, FILM COATED ORAL 2 TIMES DAILY
Qty: 14 TABLET | Refills: 0 | Status: ACTIVE | OUTPATIENT
Start: 2023-07-18 | End: 2023-07-25

## 2023-07-18 RX ADMIN — AMOXICILLIN AND CLAVULANATE POTASSIUM 1 TABLET: 875; 125 TABLET, FILM COATED ORAL at 06:46

## 2023-07-18 ASSESSMENT — FIBROSIS 4 INDEX: FIB4 SCORE: 0.44

## 2023-07-18 NOTE — ED TRIAGE NOTES
Chief Complaint   Patient presents with    Abdominal Cramping     To lower abdomen and back X 3 days, pain 4/10. +n/v, spotting 3 days ago. Approximately 13 weeks pregnant.     Tooth Ache     To R jaw, 7/10, radiating to R ear starting today     Pt ambulatory to triage for above complaints. VSS, in NAD

## 2023-07-18 NOTE — ED PROVIDER NOTES
ER Provider Note    Scribed for Ted Van M.D. by Jack Sky. 2023  4:23 AM    Primary Care Provider: Pcp Pt States None    CHIEF COMPLAINT  Chief Complaint   Patient presents with    Abdominal Cramping     To lower abdomen and back X 3 days, pain 4/10. +n/v, spotting 3 days ago. Approximately 13 weeks pregnant.     Tooth Ache     To R jaw, 7/10, radiating to R ear starting today     EXTERNAL RECORDS REVIEWED  Outpatient Notes Seen in clinic  for prenatal care where they documented single live entry pregnancy.   Inpatient Notes: She is RH+, last admitted 2019 for childbirth.  : Notes: She shows no narcotic prescriptions.       HPI/ROS  LIMITATION TO HISTORY   Select: : None  OUTSIDE HISTORIAN(S):  Family At bedside    Mayra Montenegro a 28 y.o. ~ 13 weeks pregnant female who presents to the ED for evaluation of abdominal cramping as well as toothache. Patient reports she had some abdominal pain but it has since gone away except for current cramping. Primarily she is here for her tooth pain which started 3 days ago. She states she was at work when she felt a sudden, sharp pain to her right lower teeth. Since then she has been experiencing pain to the area. She has been taking Tylenol with little relief. Patient denies any associated jaw or dental swelling, and additionally denies any dysuria.     PAST MEDICAL HISTORY  Past Medical History:   Diagnosis Date    Allergy     Patient denies medical problems        SURGICAL HISTORY  History reviewed. No pertinent surgical history.    FAMILY HISTORY  Family History   Problem Relation Age of Onset    Hypertension Mother     Multiple Sclerosis Sister     Other Sister         Pt states both her sisters have endometriosis    No Known Problems Brother     Diabetes Maternal Grandmother     Cancer Maternal Grandmother         breast       SOCIAL HISTORY   reports that she has never smoked. She has never used smokeless  "tobacco. She reports current drug use. Drug: Inhaled. She reports that she does not drink alcohol.    CURRENT MEDICATIONS  Previous Medications    IBUPROFEN (MOTRIN) 600 MG TAB    Take 1 Tab by mouth every 6 hours as needed (For cramping after delivery; do not give if patient is receiving ketorolac (Toradol)).    ONDANSETRON (ZOFRAN ODT) 4 MG TABLET DISPERSIBLE    Take 1 Tablet by mouth every 6 hours as needed for Nausea.    PRENATAL MV-MIN-FE FUM-FA-DHA (PRENATAL 1 PO)    Take  by mouth.    PRENATAL PLUS VITAMIN (STUARTNATAL 1+1) 27-1 MG TAB TABLET    Take 1 Tab by mouth every morning.       ALLERGIES  Allergies   Allergen Reactions    Cauliflower [Brassica Oleracea Italica] Anaphylaxis    Nkda [No Known Drug Allergy]         PHYSICAL EXAM  BP (!) 149/105   Pulse 91   Temp 36.2 °C (97.2 °F) (Temporal)   Resp 16   Ht 1.651 m (5' 5\")   Wt (!) 159 kg (351 lb 3.1 oz)   LMP 04/10/2023 (Approximate)   SpO2 99%   BMI 58.44 kg/m²     Nursing note and vitals reviewed.  Constitutional: Obese. Well-developed and well-nourished. No distress.   HENT: Several eroded teeth. No evidence of dental abscess. Floor of mouth is soft. No facial tenderness. Head is normocephalic and atraumatic. Oropharynx is clear and moist without exudate or erythema.   Eyes: Pupils are equal, round, and reactive to light. Conjunctiva are normal.   Cardiovascular: Normal rate and regular rhythm. No murmur heard. Normal radial pulses.  Pulmonary/Chest: Breath sounds normal. No wheezes or rales.   Abdominal: Unable to elicit any abdominal tenderness. Soft, non-distended. Normal active bowel sounds. No guarding or peritoneal signs.  No palpable abdominal aortic aneurysm. No masses. No tenderness at McBurney's point.   Musculoskeletal: Extremities exhibit normal range of motion without edema or tenderness.   Neurological: Awake, alert and oriented to person, place, and time. No focal deficits noted.  Skin: Skin is warm and dry. No " rash.  Psychiatric: Normal mood and affect. Appropriate for clinical situation     DIAGNOSTIC STUDIES    Labs:   Labs Reviewed   COMP METABOLIC PANEL - Abnormal; Notable for the following components:       Result Value    Bun 7 (*)     All other components within normal limits   HCG QUANTITATIVE - Abnormal; Notable for the following components:    Bhcg 52414.0 (*)     All other components within normal limits   URINALYSIS,CULTURE IF INDICATED - Abnormal; Notable for the following components:    Leukocyte Esterase Moderate (*)     All other components within normal limits    Narrative:     Indication for culture:->Pregnant women: fever and/or  asymptomatic screening   URINE MICROSCOPIC (W/UA) - Abnormal; Notable for the following components:    WBC  (*)     RBC 2-5 (*)     Bacteria Few (*)     Hyaline Cast 3-5 (*)     All other components within normal limits    Narrative:     Indication for culture:->Pregnant women: fever and/or  asymptomatic screening   CBC WITH DIFFERENTIAL   LIPASE   CORRECTED CALCIUM   ESTIMATED GFR   URINE CULTURE(NEW)    Narrative:     Indication for culture:->Pregnant women: fever and/or  asymptomatic screening       Radiology:   The attending emergency physician has independently interpreted the diagnostic imaging associated with this visit and am waiting the final reading from the radiologist.   Preliminary interpretation is a follows: IUP of 13 weeks noted  Radiologist interpretation:   US-OB LIMITED TRANSABDOMINAL   Final Result      Single intrauterine pregnancy of an estimated gestational age of 13 weeks, 0 days with an estimated date of delivery of 01/23/2024.      No acute process seen..          COURSE & MEDICAL DECISION MAKING     4:23 AM - Patient seen and examined at bedside. Discussed plan of care, including labs and radiology. Patient agrees to the plan of care. Ordered for Urine microscopic, Corrected Calcium, Estimated GFR, CBC with diff, CMP, Lipase, HCG quant, UA, and  US-OB to evaluate her symptoms. Differential is a dental infection and threat of miscarriage. Patient has a remarkable [place] 29[address] and an unremarkable CBC  and metabolic panel.     ED Observation Status? Yes; I am placing the patient in to an observation status due to a diagnostic uncertainty as well as therapeutic intensity. Patient placed in observation status at 5:30 AM, 7/18/2023.     Observation plan is as follows: Pending ultrasound and urinalysis.     Upon Reevaluation, the patient's condition has: Improved; and will be discharged.    Patient discharged from ED Observation status at 8:15 AM 07/18/23.    6:24 AM UA was consistent with a UTI. Recommended Augmentin 875-125 mg tablet PO should be sufficient for her dental pain and UTI.     8:08 AM US showed 13 week IUP. Discussed discharge instructions and return precautions with the patient and they were cleared for discharge. Patient was given the opportunity to ask any further questions. Patient is comfortable with discharge at this time.       INITIAL ASSESSMENT, COURSE AND PLAN  Care Narrative: Patient presents today with 2 problems #1 is dental pain.  I feel she is got infected dental carry.  Will be treated with antibiotics.    Second is abdominal pain in pregnancy.  Patient was evaluated.  Has a single live intrauterine pregnancy.      DISPOSITION AND DISCUSSIONS  I have discussed management of the patient with the following physicians and GIOVANNI's:  None     Discussion of management with other QHP or appropriate source(s): None     Escalation of care considered, and ultimately not performed: acute inpatient care management, however at this time, the patient is most appropriate for outpatient management.    Barriers to care at this time, including but not limited to: Patient does not have established PCP.     Decision tools and prescription drugs considered including, but not limited to: Antibiotics Augmentin prescribed and Pain Medications recommended  Tylenol for pain .I considered prescribing narcotic pain medication, however I feel pain should be adequately controlled with over the counter NSAIDs.      FOLLOW UP:  [facility], Emergency Dept  [address]  [place] [address] [phone]    If symptoms worsen    Pregnancy Deepthi Cristina M.D.  [address]  [place] [address]  [phone]    Schedule an appointment as soon as possible for a visit         OUTPATIENT MEDICATIONS:  New Prescriptions    AMOXICILLIN-CLAVULANATE (AUGMENTIN) 875-125 MG TAB    Take 1 Tablet by mouth 2 times a day for 7 days.      FINAL DIANGOSIS  1. Threatened miscarriage    2. Pain due to dental caries    3. Acute cystitis without hematuria            IJack (Scribe), am scribing for, and in the presence of, Ted Van M.D..    Electronically signed by: Jack Sky (Eloisaibjuan), 7/18/2023    ITed M.D. personally performed the services described in this documentation, as scribed by Jack Sky in my presence, and it is both accurate and complete.    The note accurately reflects work and decisions made by me.  Ted Van M.D.  7/18/2023  11:38 AM

## 2023-07-20 LAB
BACTERIA UR CULT: NORMAL
SIGNIFICANT IND 70042: NORMAL
SITE SITE: NORMAL
SOURCE SOURCE: NORMAL

## 2023-08-20 ENCOUNTER — HOSPITAL ENCOUNTER (EMERGENCY)
Facility: MEDICAL CENTER | Age: 28
End: 2023-08-20
Attending: STUDENT IN AN ORGANIZED HEALTH CARE EDUCATION/TRAINING PROGRAM
Payer: MEDICAID

## 2023-08-20 ENCOUNTER — APPOINTMENT (OUTPATIENT)
Dept: RADIOLOGY | Facility: MEDICAL CENTER | Age: 28
End: 2023-08-20
Attending: STUDENT IN AN ORGANIZED HEALTH CARE EDUCATION/TRAINING PROGRAM
Payer: MEDICAID

## 2023-08-20 VITALS
OXYGEN SATURATION: 94 % | TEMPERATURE: 97.6 F | BODY MASS INDEX: 48.82 KG/M2 | RESPIRATION RATE: 17 BRPM | SYSTOLIC BLOOD PRESSURE: 136 MMHG | HEIGHT: 65 IN | WEIGHT: 293 LBS | DIASTOLIC BLOOD PRESSURE: 68 MMHG | HEART RATE: 92 BPM

## 2023-08-20 DIAGNOSIS — M79.605 PAIN OF LEFT LOWER EXTREMITY: ICD-10-CM

## 2023-08-20 DIAGNOSIS — I80.02 THROMBOPHLEBITIS OF SUPERFICIAL VEINS OF LEFT LOWER EXTREMITY: ICD-10-CM

## 2023-08-20 PROCEDURE — 700102 HCHG RX REV CODE 250 W/ 637 OVERRIDE(OP): Mod: UD | Performed by: STUDENT IN AN ORGANIZED HEALTH CARE EDUCATION/TRAINING PROGRAM

## 2023-08-20 PROCEDURE — 99284 EMERGENCY DEPT VISIT MOD MDM: CPT

## 2023-08-20 PROCEDURE — 93971 EXTREMITY STUDY: CPT | Mod: LT

## 2023-08-20 PROCEDURE — A9270 NON-COVERED ITEM OR SERVICE: HCPCS | Mod: UD | Performed by: STUDENT IN AN ORGANIZED HEALTH CARE EDUCATION/TRAINING PROGRAM

## 2023-08-20 RX ORDER — ACETAMINOPHEN 500 MG
1000 TABLET ORAL ONCE
Status: COMPLETED | OUTPATIENT
Start: 2023-08-20 | End: 2023-08-20

## 2023-08-20 RX ADMIN — ACETAMINOPHEN 1000 MG: 500 TABLET, FILM COATED ORAL at 02:07

## 2023-08-20 ASSESSMENT — PAIN DESCRIPTION - PAIN TYPE: TYPE: ACUTE PAIN

## 2023-08-20 ASSESSMENT — LIFESTYLE VARIABLES
EVER HAD A DRINK FIRST THING IN THE MORNING TO STEADY YOUR NERVES TO GET RID OF A HANGOVER: NO
HAVE YOU EVER FELT YOU SHOULD CUT DOWN ON YOUR DRINKING: NO
DO YOU DRINK ALCOHOL: NO
TOTAL SCORE: 0
TOTAL SCORE: 0
CONSUMPTION TOTAL: INCOMPLETE
HAVE PEOPLE ANNOYED YOU BY CRITICIZING YOUR DRINKING: NO
TOTAL SCORE: 0
EVER FELT BAD OR GUILTY ABOUT YOUR DRINKING: NO

## 2023-08-20 ASSESSMENT — FIBROSIS 4 INDEX: FIB4 SCORE: 0.34

## 2023-08-20 NOTE — ED NOTES
Patient provided MTM number and gave instructions on arranging ride. Patient verbalized understanding.

## 2023-08-20 NOTE — ED NOTES
Pt discharged to home. Discharge paperwork provided. Education provided by ERP. Reinforced discharge instructions.  Pt was given follow up instructions and over the counter medication.   Pt verbalized understanding of all instructions for discharge.   Patient went out of the ER ambulatory with steady gait., alert and oriented x 4, with all belongings.

## 2023-08-20 NOTE — DISCHARGE INSTRUCTIONS
Your ultrasound does not show any clot in the deep vessels of your leg.  You do not need blood thinners.  You can continue to take Tylenol 1000 mg every 6 hours.    Return to the ER with worse pain, numbness or weakness or if you are otherwise feeling worse

## 2023-08-20 NOTE — ED PROVIDER NOTES
ED Provider Note    CHIEF COMPLAINT  Chief Complaint   Patient presents with    Leg Pain     From mid-thigh down to the popliteal since Thursday. Denies trauma, SOB or history of DVT. 17 weeks pregnant ().       EXTERNAL RECORDS REVIEWED  Outpatient Notes patient seen the emergency department on 2023 for lower abdominal cramping.  Noted to be 13 weeks gestation at that time.  Discharged with Augmentin for cystitis.    HPI/ROS  LIMITATION TO HISTORY   Select: : None      Mayra Rojas is a 28 y.o. female who presents to the emergency department for evaluation of left leg pain.  Patient reports she is worried she has a blood clot.  She states pain for the last 3 days.  Initially pain was located to the left upper inner thigh but is since radiated to behind her left knee.  She denies any falls or injuries.  She does report a feeling of fullness to the area.  She denies any redness, external evidence of swelling, fevers, chills.  Pain is worse with any pressure on the area.  She has no history of DVT, PE but is 17 weeks gestation.    PAST MEDICAL HISTORY   has a past medical history of Allergy and Patient denies medical problems.    SURGICAL HISTORY  patient denies any surgical history    FAMILY HISTORY  Family History   Problem Relation Age of Onset    Hypertension Mother     Multiple Sclerosis Sister     Other Sister         Pt states both her sisters have endometriosis    No Known Problems Brother     Diabetes Maternal Grandmother     Cancer Maternal Grandmother         breast       SOCIAL HISTORY  Social History     Tobacco Use    Smoking status: Never    Smokeless tobacco: Never   Vaping Use    Vaping Use: Never used   Substance and Sexual Activity    Alcohol use: No    Drug use: Yes     Types: Inhaled     Comment: marijuana 2017    Sexual activity: Yes     Partners: Male     Birth control/protection: Condom       CURRENT MEDICATIONS  Home Medications       Reviewed by Oneil Tarango R.N.  "(Registered Nurse) on 08/20/23 at 0056  Med List Status: Partial     Medication Last Dose Status   ibuprofen (MOTRIN) 600 MG Tab  Active   ondansetron (ZOFRAN ODT) 4 MG TABLET DISPERSIBLE  Active   Prenatal MV-Min-Fe Fum-FA-DHA (PRENATAL 1 PO)  Active   prenatal plus vitamin (STUARTNATAL 1+1) 27-1 MG Tab tablet  Active                    ALLERGIES  Allergies   Allergen Reactions    Cauliflower [Brassica Oleracea Italica] Anaphylaxis    Nkda [No Known Drug Allergy]        PHYSICAL EXAM  VITAL SIGNS: /64   Pulse 97   Temp 36.4 °C (97.6 °F) (Temporal)   Resp 17   Ht 1.651 m (5' 5\")   Wt (!) 158 kg (348 lb 5.2 oz)   LMP 04/10/2023 (Approximate)   SpO2 94%   BMI 57.96 kg/m²    Constitutional: No acute distress  HEENT: Atraumatic, normocephalic, pupils are equal round reactive to light, nose normal, mouth shows moist mucous membranes  Neck: Supple, no JVD, no tracheal deviation  Cardiovascular: Tachycardic, regular rhythm no murmur, no murmur, rub or gallop, 2+ pulses peripherally x4  Thorax & Lungs: No respiratory distress, no wheezes, rales or rhonchi, no chest wall tenderness.  GI: Soft, non-distended, non-tender, no rebound  Skin: Warm, dry, no acute rash or lesion  Musculoskeletal: Moving all extremities, no acute deformity, no edema, no tenderness  Neurologic: A&Ox3, at baseline mentation, cranial nerves II through XII are grossly intact, no sensory deficit, no ataxia  Psychiatric: Appropriate affect for situation at this time      DIAGNOSTIC STUDIES / PROCEDURES    RADIOLOGY  I have independently interpreted the diagnostic imaging associated with this visit and am waiting the final reading from the radiologist.   My preliminary interpretation is as follows: No evidence of deep vein thrombosis  Radiologist interpretation:   US-EXTREMITY VENOUS LOWER UNILAT LEFT   Final Result            COURSE & MEDICAL DECISION MAKING    ED Observation Status? No; Patient does not meet criteria for ED Observation. "     INITIAL ASSESSMENT, COURSE AND PLAN  Care Narrative: \    Patient presents to the ER for evaluation of left leg swelling.  She is concerned for DVT.  Notably she is slightly tachycardic however 17 weeks gestation, stable blood pressure and heart rate is within realm of normal for her gestational age.  No pregnancy related complaints today.  Extremity appears atraumatic with no obvious deformity.  No external evidence of cellulitis.  No fever and I doubt infectious etiology of symptoms.  We will perform DVT ultrasound study.  Will medicate with Tylenol.    Ultrasound with superficial venous thrombosis but no evidence of deep vein thrombosis.  Discussed these results with the patient.  She is feeling better after Tylenol.  She will follow-up with her primary care team and obstetrician for ongoing pregnancy care.  Discussed that she can continue to take Tylenol as needed for ongoing pain.  Return precautions discussed and all questions answered and she was discharged in stable condition.      ADDITIONAL PROBLEM LIST  17 weeks gestation, superficial thrombophlebitis    DISPOSITION AND DISCUSSIONS  I have discussed management of the patient with the following physicians and GIOVANNI's: None    Escalation of care considered, and ultimately not performed:blood analysis    FINAL DIAGNOSIS  1. Thrombophlebitis of superficial veins of left lower extremity    2. Pain of left lower extremity           Electronically signed by: Freeman Renteria M.D., 8/20/2023 1:56 AM

## 2023-08-20 NOTE — ED TRIAGE NOTES
Mayra Rojas  28 y.o. female  Chief Complaint   Patient presents with    Leg Pain     From mid-thigh down to the popliteal since Thursday. Denies trauma, SOB or history of DVT. 17 weeks pregnant ().     Pt ambulatory to triage with steady gait for above complaint.       Pt is GCS 15, speaking in full sentences, follows commands and responds appropriately to questions. Resp are even and unlabored.      Pt placed in ED lobby. Pt educated on triage process. Pt encouraged to alert staff for any changes.       Vitals:    23 0045   BP: (!) 119/90   Pulse:    Resp:    Temp:    SpO2:

## 2023-08-20 NOTE — ED NOTES
Patient room round done. Pt appears to be sleeping on gurney, breathing normally on room air. Patient hooked up with cardiac monitor. Patient moderate risk of fall. Bed locked, is in lower position and side rails up.

## 2023-08-20 NOTE — ED NOTES
Patient was ambulatory from triage to yellow 61. Patient changed into gown and plugged into monitor.

## 2023-08-20 NOTE — ED NOTES
Checked on bed, connected to monitor,  with unlabored respirations. Vital signs is stable.   Denied any new complaints. No current needs identified.  Gurney in low position, side rail up for pt safety. Call light within reach.

## 2023-08-24 ENCOUNTER — HOSPITAL ENCOUNTER (EMERGENCY)
Facility: MEDICAL CENTER | Age: 28
End: 2023-08-24
Attending: EMERGENCY MEDICINE
Payer: MEDICAID

## 2023-08-24 ENCOUNTER — APPOINTMENT (OUTPATIENT)
Dept: RADIOLOGY | Facility: MEDICAL CENTER | Age: 28
End: 2023-08-24
Attending: EMERGENCY MEDICINE
Payer: MEDICAID

## 2023-08-24 VITALS
TEMPERATURE: 97.8 F | OXYGEN SATURATION: 94 % | BODY MASS INDEX: 48.82 KG/M2 | HEIGHT: 65 IN | SYSTOLIC BLOOD PRESSURE: 117 MMHG | DIASTOLIC BLOOD PRESSURE: 68 MMHG | WEIGHT: 293 LBS | RESPIRATION RATE: 18 BRPM | HEART RATE: 89 BPM

## 2023-08-24 DIAGNOSIS — O20.0 THREATENED MISCARRIAGE: ICD-10-CM

## 2023-08-24 DIAGNOSIS — R82.71 BACTERIA IN URINE: ICD-10-CM

## 2023-08-24 LAB
ALBUMIN SERPL BCP-MCNC: 2.8 G/DL (ref 3.2–4.9)
ALBUMIN/GLOB SERPL: 0.8 G/DL
ALP SERPL-CCNC: 130 U/L (ref 30–99)
ALT SERPL-CCNC: 93 U/L (ref 2–50)
ANION GAP SERPL CALC-SCNC: 9 MMOL/L (ref 7–16)
APPEARANCE UR: ABNORMAL
AST SERPL-CCNC: 75 U/L (ref 12–45)
B-HCG SERPL-ACNC: 7807 MIU/ML (ref 0–5)
BACTERIA #/AREA URNS HPF: ABNORMAL /HPF
BASOPHILS # BLD AUTO: 0 % (ref 0–1.8)
BASOPHILS # BLD: 0 K/UL (ref 0–0.12)
BILIRUB SERPL-MCNC: 0.5 MG/DL (ref 0.1–1.5)
BILIRUB UR QL STRIP.AUTO: ABNORMAL
BUN SERPL-MCNC: 5 MG/DL (ref 8–22)
BURR CELLS BLD QL SMEAR: NORMAL
CALCIUM ALBUM COR SERPL-MCNC: 9.5 MG/DL (ref 8.5–10.5)
CALCIUM SERPL-MCNC: 8.5 MG/DL (ref 8.5–10.5)
CHLORIDE SERPL-SCNC: 106 MMOL/L (ref 96–112)
CO2 SERPL-SCNC: 22 MMOL/L (ref 20–33)
COLOR UR: ABNORMAL
COMMENT NL1176: NORMAL
CREAT SERPL-MCNC: 0.53 MG/DL (ref 0.5–1.4)
EOSINOPHIL # BLD AUTO: 0.12 K/UL (ref 0–0.51)
EOSINOPHIL NFR BLD: 1.7 % (ref 0–6.9)
EPI CELLS #/AREA URNS HPF: ABNORMAL /HPF
ERYTHROCYTE [DISTWIDTH] IN BLOOD BY AUTOMATED COUNT: 50.4 FL (ref 35.9–50)
GFR SERPLBLD CREATININE-BSD FMLA CKD-EPI: 127 ML/MIN/1.73 M 2
GLOBULIN SER CALC-MCNC: 3.5 G/DL (ref 1.9–3.5)
GLUCOSE SERPL-MCNC: 79 MG/DL (ref 65–99)
GLUCOSE UR STRIP.AUTO-MCNC: NEGATIVE MG/DL
HCT VFR BLD AUTO: 38.4 % (ref 37–47)
HGB BLD-MCNC: 13 G/DL (ref 12–16)
HYALINE CASTS #/AREA URNS LPF: ABNORMAL /LPF
KETONES UR STRIP.AUTO-MCNC: ABNORMAL MG/DL
LEUKOCYTE ESTERASE UR QL STRIP.AUTO: ABNORMAL
LYMPHOCYTES # BLD AUTO: 4.24 K/UL (ref 1–4.8)
LYMPHOCYTES NFR BLD: 58.1 % (ref 22–41)
MANUAL DIFF BLD: NORMAL
MCH RBC QN AUTO: 29.5 PG (ref 27–33)
MCHC RBC AUTO-ENTMCNC: 33.9 G/DL (ref 32.2–35.5)
MCV RBC AUTO: 87.1 FL (ref 81.4–97.8)
MICRO URNS: ABNORMAL
MONOCYTES # BLD AUTO: 0.25 K/UL (ref 0–0.85)
MONOCYTES NFR BLD AUTO: 3.4 % (ref 0–13.4)
MORPHOLOGY BLD-IMP: NORMAL
NEUTROPHILS # BLD AUTO: 2.69 K/UL (ref 1.82–7.42)
NEUTROPHILS NFR BLD: 36.8 % (ref 44–72)
NITRITE UR QL STRIP.AUTO: NEGATIVE
NRBC # BLD AUTO: 0 K/UL
NRBC BLD-RTO: 0 /100 WBC (ref 0–0.2)
NUMBER OF RH DOSES IND 8505RD: NORMAL
PH UR STRIP.AUTO: 6.5 [PH] (ref 5–8)
PLATELET # BLD AUTO: 195 K/UL (ref 164–446)
PLATELET BLD QL SMEAR: NORMAL
PMV BLD AUTO: 8.8 FL (ref 9–12.9)
POIKILOCYTOSIS BLD QL SMEAR: NORMAL
POTASSIUM SERPL-SCNC: 4.3 MMOL/L (ref 3.6–5.5)
PROT SERPL-MCNC: 6.3 G/DL (ref 6–8.2)
PROT UR QL STRIP: NEGATIVE MG/DL
RBC # BLD AUTO: 4.41 M/UL (ref 4.2–5.4)
RBC # URNS HPF: ABNORMAL /HPF
RBC BLD AUTO: PRESENT
RBC UR QL AUTO: ABNORMAL
RH BLD: NORMAL
SMUDGE CELLS BLD QL SMEAR: NORMAL
SODIUM SERPL-SCNC: 137 MMOL/L (ref 135–145)
SP GR UR STRIP.AUTO: 1.02
TRICHOMONAS #/AREA URNS HPF: ABNORMAL /HPF
UROBILINOGEN UR STRIP.AUTO-MCNC: 1 MG/DL
VARIANT LYMPHS BLD QL SMEAR: NORMAL
WBC # BLD AUTO: 7.3 K/UL (ref 4.8–10.8)
WBC #/AREA URNS HPF: ABNORMAL /HPF

## 2023-08-24 PROCEDURE — 76815 OB US LIMITED FETUS(S): CPT

## 2023-08-24 PROCEDURE — 84702 CHORIONIC GONADOTROPIN TEST: CPT

## 2023-08-24 PROCEDURE — 99284 EMERGENCY DEPT VISIT MOD MDM: CPT

## 2023-08-24 PROCEDURE — 87086 URINE CULTURE/COLONY COUNT: CPT

## 2023-08-24 PROCEDURE — 85007 BL SMEAR W/DIFF WBC COUNT: CPT

## 2023-08-24 PROCEDURE — 86901 BLOOD TYPING SEROLOGIC RH(D): CPT

## 2023-08-24 PROCEDURE — 81001 URINALYSIS AUTO W/SCOPE: CPT

## 2023-08-24 PROCEDURE — 85025 COMPLETE CBC W/AUTO DIFF WBC: CPT

## 2023-08-24 PROCEDURE — 80053 COMPREHEN METABOLIC PANEL: CPT

## 2023-08-24 PROCEDURE — 36415 COLL VENOUS BLD VENIPUNCTURE: CPT

## 2023-08-24 RX ORDER — NITROFURANTOIN 25; 75 MG/1; MG/1
100 CAPSULE ORAL 2 TIMES DAILY
Qty: 10 CAPSULE | Refills: 0 | Status: ACTIVE | OUTPATIENT
Start: 2023-08-24 | End: 2023-08-29

## 2023-08-24 ASSESSMENT — FIBROSIS 4 INDEX: FIB4 SCORE: 0.34

## 2023-08-24 NOTE — ED TRIAGE NOTES
"Chief Complaint   Patient presents with    Vaginal Bleeding     Light vaginal bleeding staring this AM. Pt currently 18wks pregnant.   Endorsing mild abd cramping      /80   Pulse 72   Temp 36.2 °C (97.2 °F) (Temporal)   Resp 16   Ht 1.651 m (5' 5\")   Wt (!) 156 kg (343 lb 14.7 oz)   LMP 04/10/2023 (Approximate)   SpO2 97%   BMI 57.23 kg/m²     Pt is alert/oriented and follows commands. Pt speaking in full sentences and responds appropriately to questions. No acute distress noted in triage and respirations are even and unlabored.      Pt placed in lobby and educated on triage process. Pt encouraged to alert staff for any changes in condition.    "

## 2023-08-24 NOTE — ED PROVIDER NOTES
ED Provider Note    CHIEF COMPLAINT  Chief Complaint   Patient presents with    Vaginal Bleeding     Light vaginal bleeding staring this AM. Pt currently 18wks pregnant.   Endorsing mild abd cramping        EXTERNAL RECORDS REVIEWED  Reviewed outpatient clinic visits, obstetrical records    HPI/ROS  LIMITATION TO HISTORY   None  OUTSIDE HISTORIAN(S):  Sister provided additional history    Mayra Rojas is a 28 y.o. female who presents for evaluation of some crampy lower abdominal pain and some spotting described as very minimal without passage of large clots or tissue.  The patient is approximately 18 weeks based on previous ultrasound.  She is already established at the pregnancy center.  She has had no complications with this pregnancy before she.  She has had 8 pregnancies with 3 live births 2 miscarriages and 1 elective .  She denies high fevers or chills.  No urinary symptoms such as dysuria or hematuria.  No lightheadedness dizziness night sweats or weight loss    PAST MEDICAL HISTORY   has a past medical history of Allergy and Patient denies medical problems.    SURGICAL HISTORY  patient denies any surgical history    FAMILY HISTORY  Family History   Problem Relation Age of Onset    Hypertension Mother     Multiple Sclerosis Sister     Other Sister         Pt states both her sisters have endometriosis    No Known Problems Brother     Diabetes Maternal Grandmother     Cancer Maternal Grandmother         breast       SOCIAL HISTORY  Social History     Tobacco Use    Smoking status: Never    Smokeless tobacco: Never   Vaping Use    Vaping Use: Never used   Substance and Sexual Activity    Alcohol use: No    Drug use: Yes     Types: Inhaled     Comment: marijuana 2017    Sexual activity: Yes     Partners: Male     Birth control/protection: Condom       CURRENT MEDICATIONS  Home Medications       Reviewed by Joanie Benites R.N. (Registered Nurse) on 23 at 1011  Med List Status:  "Not Addressed     Medication Last Dose Status   ibuprofen (MOTRIN) 600 MG Tab  Active   ondansetron (ZOFRAN ODT) 4 MG TABLET DISPERSIBLE  Active   Prenatal MV-Min-Fe Fum-FA-DHA (PRENATAL 1 PO)  Active   prenatal plus vitamin (STUARTNATAL 1+1) 27-1 MG Tab tablet  Active                    ALLERGIES  Allergies   Allergen Reactions    Cauliflower [Brassica Oleracea Italica] Anaphylaxis    Nkda [No Known Drug Allergy]        PHYSICAL EXAM  VITAL SIGNS: /68   Pulse 89   Temp 36.2 °C (97.2 °F) (Temporal)   Resp 18   Ht 1.651 m (5' 5\")   Wt (!) 156 kg (343 lb 14.7 oz)   LMP 04/10/2023 (Approximate)   SpO2 94%   BMI 57.23 kg/m²    Pulse ox interpretation: I interpret this pulse ox as normal.  Constitutional: Alert and oriented x 3, no acute distress  HEENT: Atraumatic normocephalic, pupils are equal round reactive to light extraocular movements are intact. The nares is clear, external ears are normal, mouth shows moist mucous membranes normal dentition for age  Neck: Supple, no JVD no tracheal deviation  Cardiovascular: Regular rate and rhythm no murmur rub or gallop 2+ pulses peripherally x4  Thorax & Lungs: No respiratory distress, no wheezes rales or rhonchi, No chest tenderness.   GI: Gravid uterus to approximately 18 weeks no rebound or guarding   skin: Warm dry no acute rash or lesion  Musculoskeletal: Moving all extremities with full range and 5 of 5 strength no acute  deformity  Neurologic: Cranial nerves III through XII are grossly intact no sensory deficit no cerebellar dysfunction   Psychiatric: Anxious          DIAGNOSTIC STUDIES / PROCEDURES    LABS  Results for orders placed or performed during the hospital encounter of 08/24/23   CBC WITH DIFFERENTIAL   Result Value Ref Range    WBC 7.3 4.8 - 10.8 K/uL    RBC 4.41 4.20 - 5.40 M/uL    Hemoglobin 13.0 12.0 - 16.0 g/dL    Hematocrit 38.4 37.0 - 47.0 %    MCV 87.1 81.4 - 97.8 fL    MCH 29.5 27.0 - 33.0 pg    MCHC 33.9 32.2 - 35.5 g/dL    RDW 50.4 " (H) 35.9 - 50.0 fL    Platelet Count 195 164 - 446 K/uL    MPV 8.8 (L) 9.0 - 12.9 fL    Neutrophils-Polys 36.80 (L) 44.00 - 72.00 %    Lymphocytes 58.10 (H) 22.00 - 41.00 %    Monocytes 3.40 0.00 - 13.40 %    Eosinophils 1.70 0.00 - 6.90 %    Basophils 0.00 0.00 - 1.80 %    Nucleated RBC 0.00 0.00 - 0.20 /100 WBC    Neutrophils (Absolute) 2.69 1.82 - 7.42 K/uL    Lymphs (Absolute) 4.24 1.00 - 4.80 K/uL    Monos (Absolute) 0.25 0.00 - 0.85 K/uL    Eos (Absolute) 0.12 0.00 - 0.51 K/uL    Baso (Absolute) 0.00 0.00 - 0.12 K/uL    NRBC (Absolute) 0.00 K/uL   COMP METABOLIC PANEL   Result Value Ref Range    Sodium 137 135 - 145 mmol/L    Potassium 4.3 3.6 - 5.5 mmol/L    Chloride 106 96 - 112 mmol/L    Co2 22 20 - 33 mmol/L    Anion Gap 9.0 7.0 - 16.0    Glucose 79 65 - 99 mg/dL    Bun 5 (L) 8 - 22 mg/dL    Creatinine 0.53 0.50 - 1.40 mg/dL    Calcium 8.5 8.5 - 10.5 mg/dL    Correct Calcium 9.5 8.5 - 10.5 mg/dL    AST(SGOT) 75 (H) 12 - 45 U/L    ALT(SGPT) 93 (H) 2 - 50 U/L    Alkaline Phosphatase 130 (H) 30 - 99 U/L    Total Bilirubin 0.5 0.1 - 1.5 mg/dL    Albumin 2.8 (L) 3.2 - 4.9 g/dL    Total Protein 6.3 6.0 - 8.2 g/dL    Globulin 3.5 1.9 - 3.5 g/dL    A-G Ratio 0.8 g/dL   RH TYPE FOR RHOGAM FROM E.D.   Result Value Ref Range    Emergency Department Rh Typing POS     Number Of Rh Doses Indicated ZERO    HCG QUANTITATIVE   Result Value Ref Range    Bhcg 7807.0 (H) 0.0 - 5.0 mIU/mL   URINALYSIS,CULTURE IF INDICATED    Specimen: Urine   Result Value Ref Range    Color DK Yellow     Character Cloudy (A)     Specific Gravity 1.023 <1.035    Ph 6.5 5.0 - 8.0    Glucose Negative Negative mg/dL    Ketones Trace (A) Negative mg/dL    Protein Negative Negative mg/dL    Bilirubin Small (A) Negative    Urobilinogen, Urine 1.0 Negative    Nitrite Negative Negative    Leukocyte Esterase Large (A) Negative    Occult Blood Trace (A) Negative    Micro Urine Req Microscopic    ESTIMATED GFR   Result Value Ref Range    GFR (CKD-EPI) 127  >60 mL/min/1.73 m 2   URINE MICROSCOPIC (W/UA)   Result Value Ref Range    WBC 10-20 (A) /hpf    RBC 0-2 /hpf    Bacteria Few (A) None /hpf    Epithelial Cells Few /hpf    Hyaline Cast 0-2 /lpf    Trichomonas Few (A) None /hpf   DIFFERENTIAL MANUAL   Result Value Ref Range    Manual Diff Status PERFORMED     Comment See Comment    PERIPHERAL SMEAR REVIEW   Result Value Ref Range    Peripheral Smear Review see below    PLATELET ESTIMATE   Result Value Ref Range    Plt Estimation Normal    MORPHOLOGY   Result Value Ref Range    RBC Morphology Present     Poikilocytosis 2+     Echinocytes 2+     Smudge Cells Moderate     Reactive Lymphocytes Few    URINE CULTURE(NEW)    Specimen: Urine   Result Value Ref Range    Significant Indicator NEG     Source UR     Site -     Culture Result -          RADIOLOGY  I have independently interpreted the diagnostic imaging associated with this visit and am waiting the final reading from the radiologist.   My preliminary interpretation is as follows: Uterine pregnancy with normal fetal heart tones  Radiologist interpretation:   US-OB LIMITED TRANSABDOMINAL   Final Result      1.  Single intrauterine pregnancy without visualized abnormality   2.  This does not constitute a formal fetal diagnostic anatomic survey.      INTERPRETING LOCATION: [address] [place] [address], [facility]          COURSE & MEDICAL DECISION MAKING    ED Observation Status? Yes; I am placing the patient in to an observation status due to a diagnostic uncertainty as well as therapeutic intensity. Patient placed in observation status at 11 AM , 8/24/2023.     Observation plan is as follows: Establish an IV perform extensive blood test.  Monitor vital signs for fever tachycardia hypotension and monitor for clinically significant blood loss.  Perform diagnostic studies such as ultrasound    Upon Reevaluation, the patient's condition has: Improved; and will be discharged.    Patient discharged from ED Observation status at 12:45  PM (Time) 8/24 (Date).     INITIAL ASSESSMENT, COURSE AND PLAN  Care Narrative:     This is a very pleasant 28-year-old female who presents here with some mild crampy lower abdominal pain and some spotting.  On exam she had reassuring vital signs.  Specifically no high fever tachycardia or hypotension or hypoxia.  She did not have any peritoneal signs and by history she reported only minimal spotting without passage of large clots or tissue.  Her hemoglobin here is normal and white blood cell count is normal.  Rh is positive and quantitative hCG is appropriate for dates.  She did have some slight elevation in transaminases but no right upper quadrant pain to suggest acute cholecystitis or gallbladder disease.  Abdominal ultrasound does confirm a viable intrauterine pregnancy.  She was observed for several hours and did not have any tachycardia or hypotension or signs of ongoing bleeding.  She does have some asymptomatic bacteria for which we will cover her with Macrobid I counseled her to partake in pelvic rest and to follow-up with the pregnancy center and return here as needed for new or worsening symptoms      ADDITIONAL PROBLEM LIST    DISPOSITION AND DISCUSSIONS  I have discussed management of the patient with the following physicians and GIOVANNI's: None    Discussion of management with other QHP or appropriate source(s): None    Escalation of care considered, and ultimately not performed: None none    Barriers to care at this time, including but not limited to: none    Decision tools and prescription drugs considered including, but not limited to: None    FINAL DIAGNOSIS  1. Threatened miscarriage    2. Bacteria in urine           Electronically signed by: Marin Lee M.D., 8/24/2023 11:01 AM

## 2023-08-26 LAB
BACTERIA UR CULT: NORMAL
SIGNIFICANT IND 70042: NORMAL
SITE SITE: NORMAL
SOURCE SOURCE: NORMAL

## 2023-09-05 ENCOUNTER — APPOINTMENT (OUTPATIENT)
Dept: OBGYN | Facility: CLINIC | Age: 28
End: 2023-09-05
Payer: MEDICAID

## 2023-09-07 ENCOUNTER — APPOINTMENT (OUTPATIENT)
Dept: RADIOLOGY | Facility: IMAGING CENTER | Age: 28
End: 2023-09-07
Attending: OBSTETRICS & GYNECOLOGY
Payer: MEDICAID

## 2023-09-08 ENCOUNTER — APPOINTMENT (OUTPATIENT)
Dept: RADIOLOGY | Facility: IMAGING CENTER | Age: 28
End: 2023-09-08
Attending: OBSTETRICS & GYNECOLOGY
Payer: MEDICAID

## 2023-09-08 ENCOUNTER — APPOINTMENT (OUTPATIENT)
Dept: OBGYN | Facility: CLINIC | Age: 28
End: 2023-09-08
Payer: MEDICAID

## 2023-09-08 DIAGNOSIS — Z34.90 PREGNANCY, UNSPECIFIED GESTATIONAL AGE: ICD-10-CM

## 2023-09-08 PROCEDURE — 76805 OB US >/= 14 WKS SNGL FETUS: CPT | Mod: TC | Performed by: OBSTETRICS & GYNECOLOGY

## 2023-09-18 ENCOUNTER — APPOINTMENT (OUTPATIENT)
Dept: OBGYN | Facility: CLINIC | Age: 28
End: 2023-09-18
Payer: MEDICAID

## 2023-09-19 ENCOUNTER — HOSPITAL ENCOUNTER (OUTPATIENT)
Dept: LAB | Facility: MEDICAL CENTER | Age: 28
End: 2023-09-19
Attending: NURSE PRACTITIONER
Payer: MEDICAID

## 2023-09-19 ENCOUNTER — INITIAL PRENATAL (OUTPATIENT)
Dept: OBGYN | Facility: CLINIC | Age: 28
End: 2023-09-19
Payer: MEDICAID

## 2023-09-19 ENCOUNTER — HOSPITAL ENCOUNTER (OUTPATIENT)
Facility: MEDICAL CENTER | Age: 28
End: 2023-09-19
Attending: NURSE PRACTITIONER
Payer: MEDICAID

## 2023-09-19 VITALS — WEIGHT: 293 LBS | SYSTOLIC BLOOD PRESSURE: 120 MMHG | DIASTOLIC BLOOD PRESSURE: 64 MMHG | BODY MASS INDEX: 55.25 KG/M2

## 2023-09-19 DIAGNOSIS — Z34.90 PREGNANCY, UNSPECIFIED GESTATIONAL AGE: ICD-10-CM

## 2023-09-19 DIAGNOSIS — O09.299 HISTORY OF MACROSOMIA IN INFANT IN PRIOR PREGNANCY, CURRENTLY PREGNANT: ICD-10-CM

## 2023-09-19 DIAGNOSIS — O99.212 OBESITY AFFECTING PREGNANCY IN SECOND TRIMESTER: ICD-10-CM

## 2023-09-19 DIAGNOSIS — O09.892 SUPERVISION OF OTHER HIGH RISK PREGNANCIES, SECOND TRIMESTER: Primary | ICD-10-CM

## 2023-09-19 DIAGNOSIS — O09.892 SUPERVISION OF OTHER HIGH RISK PREGNANCIES, SECOND TRIMESTER: ICD-10-CM

## 2023-09-19 DIAGNOSIS — Z20.9 EXPOSURE TO POTENTIAL INFECTION: ICD-10-CM

## 2023-09-19 LAB
ABO GROUP BLD: NORMAL
BLD GP AB SCN SERPL QL: NORMAL
ERYTHROCYTE [DISTWIDTH] IN BLOOD BY AUTOMATED COUNT: 52.1 FL (ref 35.9–50)
EST. AVERAGE GLUCOSE BLD GHB EST-MCNC: 91 MG/DL
HBA1C MFR BLD: 4.8 % (ref 4–5.6)
HBV SURFACE AG SER QL: ABNORMAL
HCT VFR BLD AUTO: 40.4 % (ref 37–47)
HCV AB SER QL: ABNORMAL
HGB BLD-MCNC: 13.1 G/DL (ref 12–16)
HIV 1+2 AB+HIV1 P24 AG SERPL QL IA: NORMAL
MCH RBC QN AUTO: 29.6 PG (ref 27–33)
MCHC RBC AUTO-ENTMCNC: 32.4 G/DL (ref 32.2–35.5)
MCV RBC AUTO: 91.4 FL (ref 81.4–97.8)
PLATELET # BLD AUTO: 230 K/UL (ref 164–446)
PMV BLD AUTO: 9.4 FL (ref 9–12.9)
RBC # BLD AUTO: 4.42 M/UL (ref 4.2–5.4)
RH BLD: NORMAL
RUBV AB SER QL: 37.8 IU/ML
T PALLIDUM AB SER QL IA: ABNORMAL
WBC # BLD AUTO: 8.3 K/UL (ref 4.8–10.8)

## 2023-09-19 PROCEDURE — 0501F PRENATAL FLOW SHEET: CPT | Performed by: NURSE PRACTITIONER

## 2023-09-19 PROCEDURE — 87624 HPV HI-RISK TYP POOLED RSLT: CPT

## 2023-09-19 PROCEDURE — 83036 HEMOGLOBIN GLYCOSYLATED A1C: CPT

## 2023-09-19 PROCEDURE — 36415 COLL VENOUS BLD VENIPUNCTURE: CPT

## 2023-09-19 PROCEDURE — 88175 CYTOPATH C/V AUTO FLUID REDO: CPT

## 2023-09-19 PROCEDURE — 3078F DIAST BP <80 MM HG: CPT | Performed by: NURSE PRACTITIONER

## 2023-09-19 PROCEDURE — 86850 RBC ANTIBODY SCREEN: CPT

## 2023-09-19 PROCEDURE — 3074F SYST BP LT 130 MM HG: CPT | Performed by: NURSE PRACTITIONER

## 2023-09-19 PROCEDURE — 86803 HEPATITIS C AB TEST: CPT

## 2023-09-19 PROCEDURE — 80307 DRUG TEST PRSMV CHEM ANLYZR: CPT

## 2023-09-19 PROCEDURE — 86762 RUBELLA ANTIBODY: CPT

## 2023-09-19 PROCEDURE — 85027 COMPLETE CBC AUTOMATED: CPT

## 2023-09-19 PROCEDURE — 86900 BLOOD TYPING SEROLOGIC ABO: CPT

## 2023-09-19 PROCEDURE — 86780 TREPONEMA PALLIDUM: CPT

## 2023-09-19 PROCEDURE — 87389 HIV-1 AG W/HIV-1&-2 AB AG IA: CPT

## 2023-09-19 PROCEDURE — 87340 HEPATITIS B SURFACE AG IA: CPT

## 2023-09-19 PROCEDURE — 87086 URINE CULTURE/COLONY COUNT: CPT

## 2023-09-19 PROCEDURE — 86901 BLOOD TYPING SEROLOGIC RH(D): CPT

## 2023-09-19 PROCEDURE — 87491 CHLMYD TRACH DNA AMP PROBE: CPT

## 2023-09-19 PROCEDURE — 87591 N.GONORRHOEAE DNA AMP PROB: CPT

## 2023-09-19 ASSESSMENT — ENCOUNTER SYMPTOMS
COUGH: 1
EYES NEGATIVE: 1
HEMOPTYSIS: 0
CARDIOVASCULAR NEGATIVE: 1
CONSTITUTIONAL NEGATIVE: 1
SPUTUM PRODUCTION: 0
MUSCULOSKELETAL NEGATIVE: 1
WHEEZING: 0
PSYCHIATRIC NEGATIVE: 1
SHORTNESS OF BREATH: 0
GASTROINTESTINAL NEGATIVE: 1
NEUROLOGICAL NEGATIVE: 1

## 2023-09-19 ASSESSMENT — FIBROSIS 4 INDEX: FIB4 SCORE: 1.2

## 2023-09-19 NOTE — PROGRESS NOTES
S:  Mayra Rojas is a 28 y.o.  who presents for her new OB exam.  She is 21w6d with and KATLYN of Estimated Date of Delivery: 24 based off of US . She has no complaints.  She is currently not working. Discussed heavy lifting and chemical exposure. Has had a few ER visits for bleeding. US was done and all was normal. No other care in this pregnancy.     Too late for AFP. Declines NIPT. Declines CF.  Denies VB, LOF, or cramping.  Denies dysuria, vaginal DC. Reports good fetal movement.     Pt is single and lives with her best friend and kids.  Pregnancy is unplanned but desired. FOB is not involved. She is working on legal issues surrounding support. She has been very stressed about this and has little to no appetite.      Her past partner was diagnosed this year with HSV. Denies any outbreaks during or after their relationship.    Discussed diet and exercise during pregnancy. Encouraged good nutrition, and daily exercise including walking or swimming. Discussed expected weight gain during pregnancy. Discussed adequate hydration during pregnancy.    Past Medical History:   Diagnosis Date    Allergy     Patient denies medical problems      Family History   Problem Relation Age of Onset    Hypertension Mother     Multiple Sclerosis Sister     Other Sister         Pt states both her sisters have endometriosis    No Known Problems Brother     Diabetes Maternal Grandmother     Cancer Maternal Grandmother         breast     Social History     Socioeconomic History    Marital status: Single     Spouse name: Not on file    Number of children: Not on file    Years of education: Not on file    Highest education level: Not on file   Occupational History    Not on file   Tobacco Use    Smoking status: Never    Smokeless tobacco: Never   Vaping Use    Vaping Use: Never used   Substance and Sexual Activity    Alcohol use: No    Drug use: Yes     Types: Inhaled     Comment: marijuana 2017    Sexual  activity: Yes     Partners: Male     Birth control/protection: Condom   Other Topics Concern    Not on file   Social History Narrative    Not on file     Social Determinants of Health     Financial Resource Strain: Not on file   Food Insecurity: Not on file   Transportation Needs: Not on file   Physical Activity: Not on file   Stress: Not on file   Social Connections: Not on file   Intimate Partner Violence: Not on file   Housing Stability: Not on file     OB History    Para Term  AB Living   8 3 3   4 3   SAB IAB Ectopic Molar Multiple Live Births   3         3      # Outcome Date GA Lbr Alfonso/2nd Weight Sex Delivery Anes PTL Lv   8 Current            7 AB 22 8w0d             Birth Comments: Pt states no complications.   6 SAB               Birth Comments: Pt states no D/C needed.   5 Term 19 40w0d  8 lb 14.3 oz M Vag-Spont EPI N LINDA   4 Term 17 40w0d  7 lb 6 oz F Vag-Spont EPI N LINDA      Birth Comments: Pt states no complications   3 2016 10w0d    SAB         Birth Comments: passed on its own   2 Term 10/30/15 41w0d  8 lb 3.2 oz F Vag-Spont EPI N LINDA      Birth Comments: Pt states no complications   1 2013 5w0d             Birth Comments: no D&C needed       History of Varicella Virus: yes  History of HSV I or II in self or partner: no. Past partner has HSV, but not while they were together.   History of Thyroid problems: no    O:  /64   Wt (!) 332 lb    See Prenatal Physical.    Wet mount: deferred, no s/sx  Chaperone offered: yes    A:   1.  IUP @ 21w6d per US done at 8w5d        2.  S=D        3.  See problem list below        4.  Obesity in pregnancy        5.  History of macrosomia             Patient Active Problem List    Diagnosis Date Noted    Supervision of other high risk pregnancies, second trimester 2023    Obesity affecting pregnancy in second trimester 2023    History of macrosomia in infant in prior pregnancy, currently pregnant  09/19/2023    Vaginal bleeding 02/26/2019    Body mass index (BMI) of 50-59.9 in adult ([facility]) 01/09/2019         P:  1.  GC/CT & pap done        2.  Prenatal labs ordered - lab slip given        3.  Discussed PNV, diet, avoidances and adequate water intake        4.  NOB packet given        5.  Return to office in 4 wks        6.  Complete OB US already done        7.  Hemoglobin A1C    Orders Placed This Encounter    PREG CNTR PRENATAL PN    URINE DRUG SCREEN    THINPREP PAP W/HPV AND CTNG    HEMOGLOBIN A1C        HPI    Review of Systems   Constitutional: Negative.    HENT: Negative.     Eyes: Negative.    Respiratory:  Positive for cough. Negative for hemoptysis, sputum production, shortness of breath and wheezing.    Cardiovascular: Negative.    Gastrointestinal: Negative.    Genitourinary: Negative.    Musculoskeletal: Negative.    Skin: Negative.    Neurological: Negative.    Endo/Heme/Allergies: Negative.    Psychiatric/Behavioral: Negative.     All other systems reviewed and are negative.        Objective     /64   Wt (!) 332 lb   LMP 04/10/2023 (Approximate)   BMI 55.25 kg/m²      Physical Exam  Vitals and nursing note reviewed. Exam conducted with a chaperone present.   Constitutional:       Appearance: Normal appearance. She is obese.   HENT:      Head: Normocephalic.      Nose: Nose normal.   Eyes:      Extraocular Movements: Extraocular movements intact.   Cardiovascular:      Rate and Rhythm: Normal rate and regular rhythm.      Pulses: Normal pulses.      Heart sounds: Normal heart sounds.   Pulmonary:      Effort: Pulmonary effort is normal.      Breath sounds: Normal breath sounds.   Abdominal:      Palpations: Abdomen is soft.   Genitourinary:     General: Normal vulva.      Rectum: Normal.   Musculoskeletal:         General: Normal range of motion.      Cervical back: Normal range of motion and neck supple.   Skin:     General: Skin is warm and dry.      Capillary Refill: Capillary refill  takes less than 2 seconds.   Neurological:      General: No focal deficit present.      Mental Status: She is alert and oriented to person, place, and time.   Psychiatric:         Mood and Affect: Mood normal.         Behavior: Behavior normal.         Thought Content: Thought content normal.         Judgment: Judgment normal.       Assessment & Plan       1. Supervision of other high risk pregnancies, second trimester  KATLYN 1/24/2024 per US  - PREG CNTR PRENATAL PN; Future  - URINE DRUG SCREEN; Future  - THINPREP PAP W/HPV AND CTNG; Future  - HEMOGLOBIN A1C; Future    2. Obesity affecting pregnancy in second trimester  - HEMOGLOBIN A1C; Future    3. History of macrosomia in infant in prior pregnancy, currently pregnant  - HEMOGLOBIN A1C; Future

## 2023-09-19 NOTE — PROGRESS NOTES
NOB today  LMP: 04/10/23  Last pap:2018 normal  Phone # [phone] (home)    Pharmacy confirmed  On PNV  c/o none

## 2023-09-20 DIAGNOSIS — O09.892 SUPERVISION OF OTHER HIGH RISK PREGNANCIES, SECOND TRIMESTER: ICD-10-CM

## 2023-09-21 LAB
AMPHET CTO UR CFM-MCNC: NEGATIVE NG/ML
BACTERIA UR CULT: NORMAL
BARBITURATES CTO UR CFM-MCNC: NEGATIVE NG/ML
BENZODIAZ CTO UR CFM-MCNC: NEGATIVE NG/ML
CANNABINOIDS CTO UR CFM-MCNC: NEGATIVE NG/ML
COCAINE CTO UR CFM-MCNC: NEGATIVE NG/ML
DRUG COMMENT 753798: NORMAL
METHADONE CTO UR CFM-MCNC: NEGATIVE NG/ML
OPIATES CTO UR CFM-MCNC: NEGATIVE NG/ML
PCP CTO UR CFM-MCNC: NEGATIVE NG/ML
PROPOXYPH CTO UR CFM-MCNC: NEGATIVE NG/ML
SIGNIFICANT IND 70042: NORMAL
SITE SITE: NORMAL
SOURCE SOURCE: NORMAL

## 2023-09-22 LAB
C TRACH RRNA CVX QL NAA+PROBE: NEGATIVE
CYTOLOGIST CVX/VAG CYTO: NORMAL
CYTOLOGY CVX/VAG DOC CYTO: NORMAL
CYTOLOGY CVX/VAG DOC THIN PREP: NORMAL
HPV I/H RISK 4 DNA CVX QL PROBE+SIG AMP: NEGATIVE
N GONORRHOEA RRNA CVX QL NAA+PROBE: NEGATIVE
NOTE NL11727A: NORMAL
OTHER STN SPEC: NORMAL
STAT OF ADQ CVX/VAG CYTO-IMP: NORMAL

## 2023-10-19 ENCOUNTER — ROUTINE PRENATAL (OUTPATIENT)
Dept: OBGYN | Facility: CLINIC | Age: 28
End: 2023-10-19
Payer: MEDICAID

## 2023-10-19 VITALS — DIASTOLIC BLOOD PRESSURE: 62 MMHG | WEIGHT: 293 LBS | BODY MASS INDEX: 54.91 KG/M2 | SYSTOLIC BLOOD PRESSURE: 114 MMHG

## 2023-10-19 DIAGNOSIS — O09.892 SUPERVISION OF OTHER HIGH RISK PREGNANCIES, SECOND TRIMESTER: ICD-10-CM

## 2023-10-19 PROCEDURE — 3078F DIAST BP <80 MM HG: CPT | Performed by: PHYSICIAN ASSISTANT

## 2023-10-19 PROCEDURE — 3074F SYST BP LT 130 MM HG: CPT | Performed by: PHYSICIAN ASSISTANT

## 2023-10-19 PROCEDURE — 0502F SUBSEQUENT PRENATAL CARE: CPT | Performed by: PHYSICIAN ASSISTANT

## 2023-10-19 ASSESSMENT — FIBROSIS 4 INDEX: FIB4 SCORE: 1.01

## 2023-10-19 NOTE — PROGRESS NOTES
Pt. Here for OB/FU. Reports Good FM.   Good # [phone]  Pt states having some nausea, spotting last week and lower back pain.   Pt given 1 HR GTT, RPR and CBC lab slip today along with instructions.   Flu vaccine offered and declined.

## 2023-10-19 NOTE — PROGRESS NOTES
Pt has no complaints with cramping, bleeding or pain, though pt has had incr low back pain. Pt also has a lot of stressors - daughter had seizure of unknown reasoning today, also this pregnancy is product of one-night stand, who wont be in the picture and FOB of her other children just went to [facility]. Pt states she has good support with friends though. +FM. US, PNL, PAP wnl - pt notified of results. HgA1C 4.8%. 1hr GTT, CBC, T pall lab slip given. Declines Flu vax. RTC 4 wk or sooner prn.

## 2023-10-31 ENCOUNTER — HOSPITAL ENCOUNTER (OUTPATIENT)
Dept: LAB | Facility: MEDICAL CENTER | Age: 28
End: 2023-10-31
Attending: PHYSICIAN ASSISTANT
Payer: MEDICAID

## 2023-10-31 DIAGNOSIS — O09.892 SUPERVISION OF OTHER HIGH RISK PREGNANCIES, SECOND TRIMESTER: ICD-10-CM

## 2023-10-31 LAB
ERYTHROCYTE [DISTWIDTH] IN BLOOD BY AUTOMATED COUNT: 45.2 FL (ref 35.9–50)
GLUCOSE 1H P 50 G GLC PO SERPL-MCNC: 119 MG/DL (ref 70–139)
HCT VFR BLD AUTO: 39.8 % (ref 37–47)
HGB BLD-MCNC: 12.5 G/DL (ref 12–16)
MCH RBC QN AUTO: 29.6 PG (ref 27–33)
MCHC RBC AUTO-ENTMCNC: 31.4 G/DL (ref 32.2–35.5)
MCV RBC AUTO: 94.1 FL (ref 81.4–97.8)
PLATELET # BLD AUTO: 235 K/UL (ref 164–446)
PMV BLD AUTO: 10.1 FL (ref 9–12.9)
RBC # BLD AUTO: 4.23 M/UL (ref 4.2–5.4)
T PALLIDUM AB SER QL IA: NORMAL
WBC # BLD AUTO: 8.5 K/UL (ref 4.8–10.8)

## 2023-10-31 PROCEDURE — 36415 COLL VENOUS BLD VENIPUNCTURE: CPT

## 2023-10-31 PROCEDURE — 86780 TREPONEMA PALLIDUM: CPT

## 2023-10-31 PROCEDURE — 85027 COMPLETE CBC AUTOMATED: CPT

## 2023-10-31 PROCEDURE — 82950 GLUCOSE TEST: CPT

## 2023-11-08 ENCOUNTER — ROUTINE PRENATAL (OUTPATIENT)
Dept: OBGYN | Facility: CLINIC | Age: 28
End: 2023-11-08
Payer: MEDICAID

## 2023-11-08 VITALS — DIASTOLIC BLOOD PRESSURE: 70 MMHG | BODY MASS INDEX: 55.41 KG/M2 | WEIGHT: 293 LBS | SYSTOLIC BLOOD PRESSURE: 110 MMHG

## 2023-11-08 DIAGNOSIS — O09.892 SUPERVISION OF OTHER HIGH RISK PREGNANCIES, SECOND TRIMESTER: ICD-10-CM

## 2023-11-08 PROCEDURE — 0502F SUBSEQUENT PRENATAL CARE: CPT | Performed by: PHYSICIAN ASSISTANT

## 2023-11-08 PROCEDURE — 90471 IMMUNIZATION ADMIN: CPT | Performed by: PHYSICIAN ASSISTANT

## 2023-11-08 PROCEDURE — 3078F DIAST BP <80 MM HG: CPT | Performed by: PHYSICIAN ASSISTANT

## 2023-11-08 PROCEDURE — 3074F SYST BP LT 130 MM HG: CPT | Performed by: PHYSICIAN ASSISTANT

## 2023-11-08 PROCEDURE — 90715 TDAP VACCINE 7 YRS/> IM: CPT | Performed by: PHYSICIAN ASSISTANT

## 2023-11-08 ASSESSMENT — FIBROSIS 4 INDEX: FIB4 SCORE: 0.99

## 2023-11-08 NOTE — PROGRESS NOTES
"Pt has no complaints with cramping, UCs, VB, LOF. +FM - [facility]KC sheet given today. TDaP given today, pt declines Flu vax. Declines BTL. 1hr GTT, CBC, T pall wnl - pt notified of results. Pt notes she wants to be under alias in hospital, doesn't want FOB to be aware of baby's birth, also planning on him not being on the birth certificate. He has had some difficult behavior, threatening toward the patient and even \"faked his own suicide.\" Pt has good support with friends, planning on paternity testing after delivery if needed. RTC 2 wk or sooner prn.   "

## 2023-11-08 NOTE — PROGRESS NOTES
OB follow up   + fetal movement.   No VB, LOF or UC's.  Phone # [phone] (home)   Preferred pharmacy confirmed.  Pt wants to know if they can do DNA testing.

## 2023-11-13 ENCOUNTER — TELEPHONE (OUTPATIENT)
Dept: OBGYN | Facility: CLINIC | Age: 28
End: 2023-11-13

## 2023-11-13 NOTE — TELEPHONE ENCOUNTER
Pt called stating she may have a STD due to previous partner testing positive for one. Pt states that back in August provider did not check urine for std. Informed pt recent pap and labs are wnl. Informed pt only way to tell if she has std is to get swabbed tested. Pt has appt already on 12/7/23 informed pt she may go to a UC to be tested if she is concerned. Pt understood

## 2023-11-14 ENCOUNTER — HOSPITAL ENCOUNTER (EMERGENCY)
Facility: MEDICAL CENTER | Age: 28
End: 2023-11-14
Attending: OBSTETRICS & GYNECOLOGY | Admitting: OBSTETRICS & GYNECOLOGY
Payer: MEDICAID

## 2023-11-14 VITALS
HEART RATE: 91 BPM | WEIGHT: 293 LBS | RESPIRATION RATE: 16 BRPM | DIASTOLIC BLOOD PRESSURE: 57 MMHG | TEMPERATURE: 97.7 F | OXYGEN SATURATION: 95 % | SYSTOLIC BLOOD PRESSURE: 113 MMHG | HEIGHT: 65 IN | BODY MASS INDEX: 48.82 KG/M2

## 2023-11-14 LAB
AMORPH CRY #/AREA URNS HPF: PRESENT /HPF
APPEARANCE UR: CLEAR
APPEARANCE UR: CLEAR
BACTERIA #/AREA URNS HPF: ABNORMAL /HPF
BILIRUB UR QL STRIP.AUTO: NEGATIVE
CANDIDA DNA VAG QL PROBE+SIG AMP: NEGATIVE
COLOR UR AUTO: YELLOW
COLOR UR: YELLOW
EPI CELLS #/AREA URNS HPF: NEGATIVE /HPF
G VAGINALIS DNA VAG QL PROBE+SIG AMP: NEGATIVE
GLUCOSE UR QL STRIP.AUTO: NEGATIVE MG/DL
GLUCOSE UR STRIP.AUTO-MCNC: NEGATIVE MG/DL
KETONES UR QL STRIP.AUTO: NEGATIVE MG/DL
KETONES UR STRIP.AUTO-MCNC: NEGATIVE MG/DL
LEUKOCYTE ESTERASE UR QL STRIP.AUTO: ABNORMAL
LEUKOCYTE ESTERASE UR QL STRIP.AUTO: ABNORMAL
MICRO URNS: ABNORMAL
NITRITE UR QL STRIP.AUTO: NEGATIVE
NITRITE UR QL STRIP.AUTO: NEGATIVE
PH UR STRIP.AUTO: 7 [PH] (ref 5–8)
PH UR STRIP.AUTO: 7.5 [PH] (ref 5–8)
PROT UR QL STRIP: ABNORMAL MG/DL
PROT UR QL STRIP: NEGATIVE MG/DL
RBC # URNS HPF: ABNORMAL /HPF
RBC UR QL AUTO: NEGATIVE
RBC UR QL AUTO: NEGATIVE
SP GR UR STRIP.AUTO: 1.01
SP GR UR STRIP.AUTO: 1.02 (ref 1–1.03)
T VAGINALIS DNA VAG QL PROBE+SIG AMP: POSITIVE
UROBILINOGEN UR STRIP.AUTO-MCNC: 1 MG/DL
WBC #/AREA URNS HPF: ABNORMAL /HPF

## 2023-11-14 PROCEDURE — 81001 URINALYSIS AUTO W/SCOPE: CPT

## 2023-11-14 PROCEDURE — 87660 TRICHOMONAS VAGIN DIR PROBE: CPT

## 2023-11-14 PROCEDURE — 99284 EMERGENCY DEPT VISIT MOD MDM: CPT

## 2023-11-14 PROCEDURE — 87510 GARDNER VAG DNA DIR PROBE: CPT

## 2023-11-14 PROCEDURE — 87086 URINE CULTURE/COLONY COUNT: CPT

## 2023-11-14 PROCEDURE — 87591 N.GONORRHOEAE DNA AMP PROB: CPT

## 2023-11-14 PROCEDURE — 81002 URINALYSIS NONAUTO W/O SCOPE: CPT | Mod: XU

## 2023-11-14 PROCEDURE — 59025 FETAL NON-STRESS TEST: CPT

## 2023-11-14 PROCEDURE — 87491 CHLMYD TRACH DNA AMP PROBE: CPT

## 2023-11-14 PROCEDURE — 87480 CANDIDA DNA DIR PROBE: CPT

## 2023-11-14 RX ORDER — METRONIDAZOLE 500 MG/1
500 TABLET ORAL 2 TIMES DAILY
Qty: 14 TABLET | Refills: 0 | Status: SHIPPED | OUTPATIENT
Start: 2023-11-14 | End: 2023-11-21

## 2023-11-14 ASSESSMENT — PAIN DESCRIPTION - PAIN TYPE: TYPE: ACUTE PAIN

## 2023-11-14 ASSESSMENT — FIBROSIS 4 INDEX: FIB4 SCORE: 0.99

## 2023-11-14 NOTE — PROGRESS NOTES
, 29.6 weeks (EDC 24) presents to L&D with c/o low back pain, cramping and desire for STI testing. Pt endorses +FM. Denies vaginal bleeding or LOF. Pt states she recently was told that ex-partner tested positive for trichomonas; last intercourse was in July per report. Pt states she has noticed an increased amount of white discharge. VSS. Monitors applied x2.  1129: Report called to Dr. Bejarano, orders received.  1140: Labs collected. Scant amount of bright red blood noted in vagina on sterile speculum exam. Pt states she has had some spotting over the last couple of days, but none today.   1218: Dr. Bejarano at bedside. Per MD, may take baby off monitor at this time. TOCO to remain in place to monitor for contractions.   1307: Call made to Dr. Bejarano with update on labs. DC orders received. MD provided with pt's contact info to call patient with results.  1320: Discharge instructions given to patient, verbalizes good understanding. Pt is discharged in stable condition to home.

## 2023-11-14 NOTE — PROGRESS NOTES
Patient has a history of being exposed to trichomonas last summer.  She only just recently learned that her partner was positive.  She had a test performed in triage at [facility] but it took a number of hours for the results to get back therefore there was a delay in the results.  Therefore the prescription has been sent to her pharmacy and patient will be informed to take Flagyl 500 mg 1 p.o. twice daily.  Patient has a follow-up appointment in 2 weeks and can be retested at that time.  Patient has not been sexually active since September therefore partner treatment is not required at this time.

## 2023-11-14 NOTE — ED TRIAGE NOTES
Computed KFRE 2-Year unavailable. Necessary lab results were not found in the last year.OB ED EVALUATION NOTE    SUBJECTIVE:  Mayra Rojas is a 28 y.o.,  at 29w6d who presents for complaint and 7 beat pelvic cramping.  Patient recently learned that a partner she was with last summer has tested positive for trichomonas and she is concerned she may have been exposed.  Patient had negative screening test for chlamydia and gonorrhea in September and states she has not been sexually active since then.  Patient called the office requesting testing and was told there was no access and patient was advised to come to triage for testing.    She denies vaginal bleeding, leakage of fluid, or contractions. She states the baby is moving.     I personally reviewed the past medical and surgical histories, as well as the problem list.    OBJECTIVE:  Vital Signs:   Vitals:    23 1114   BP: 113/57   Pulse: 91   Resp: 16   Temp: 36.5 °C (97.7 °F)   SpO2: 95%     GEN: NAD  Abd: soft, NT, gravid  Ext: no edema    Pelvic:   SSE: Deferred    NST read: Baseline 150 bpm, moderate variability, accelerations noted 10 x 10 at least 2 within 20 minutes consistent with gestational age.  Reactive nonstress test.  Pierson: Possible minimal irritability versus tracing artifact.    LABS / IMAGING:  Results for orders placed or performed during the hospital encounter of 23   VAGINAL PATHOGENS DNA PANEL   Result Value Ref Range    Candida species DNA Probe Negative Negative    Trichamonas vaginalis DNA Probe POSITIVE (A) Negative    Gardnerella vaginalis DNA Probe Negative Negative   Chlamydia/GC, PCR (Urine)    Specimen: Urine   Result Value Ref Range    Source Urine    URINALYSIS    Specimen: Urine, Clean Catch   Result Value Ref Range    Color Yellow     Character Clear     Specific Gravity 1.015 <1.035    Ph 7.0 5.0 - 8.0    Glucose Negative Negative mg/dL    Ketones Negative Negative mg/dL    Protein Negative  Negative mg/dL    Bilirubin Negative Negative    Urobilinogen, Urine 1.0 Negative    Nitrite Negative Negative    Leukocyte Esterase Moderate (A) Negative    Occult Blood Negative Negative    Micro Urine Req Microscopic    URINE MICROSCOPIC (W/UA)   Result Value Ref Range    WBC 5-10 (A) /hpf    RBC 0-2 /hpf    Bacteria Moderate (A) None /hpf    Epithelial Cells Negative /hpf    Amorphous Crystal Present /hpf   POCT urinalysis device results   Result Value Ref Range    POC Color Yellow     POC Appearance Clear     POC Glucose Negative Negative mg/dL    POC Ketones Negative Negative mg/dL    POC Specific Gravity 1.020 1.005 - 1.030    POC Blood Negative Negative    POC Urine PH 7.5 5.0 - 8.0    POC Protein Trace (A) Negative mg/dL    POC Nitrites Negative Negative    POC Leukocyte Esterase Small (A) Negative         ASSESSMENT AND PLAN:  28 y.o.    Patient Active Problem List    Diagnosis Date Noted    Supervision of other high risk pregnancies, second trimester 2023    Obesity affecting pregnancy in second trimester 2023    History of macrosomia in infant in prior pregnancy, currently pregnant 2023    Exposure to potential infection 2023    Vaginal bleeding 2019    Body mass index (BMI) of 50-59.9 in adult ([facility]) 2019     The patient was tested for trichomonas gonorrhea and chlamydia.  A urine analysis was obtained and revealed positive leukocyte Estrace a culture was sent.  The results of the trichomonas test are positive and patient will be contacted and treated with antibiotics.  It took a number of hours for these results to come back and therefore the patient was not required to wait and see till the results were back she was sent home.  The gonorrhea and Chlamydia test are negative.    The patient was instructed to follow up in the office in 2 weeks as scheduled. She was counseled to call or return for vaginal bleeding, regular contractions, leakage of fluid or  decreased fetal movement.    Michelle Bejarano M.D.

## 2023-11-15 LAB
C TRACH DNA SPEC QL NAA+PROBE: NEGATIVE
N GONORRHOEA DNA SPEC QL NAA+PROBE: NEGATIVE
SPECIMEN SOURCE: NORMAL

## 2023-11-16 LAB
BACTERIA UR CULT: NORMAL
SIGNIFICANT IND 70042: NORMAL
SITE SITE: NORMAL
SOURCE SOURCE: NORMAL

## 2023-12-07 ENCOUNTER — ROUTINE PRENATAL (OUTPATIENT)
Dept: OBGYN | Facility: CLINIC | Age: 28
End: 2023-12-07
Payer: MEDICAID

## 2023-12-07 VITALS — DIASTOLIC BLOOD PRESSURE: 66 MMHG | WEIGHT: 293 LBS | BODY MASS INDEX: 55.81 KG/M2 | SYSTOLIC BLOOD PRESSURE: 112 MMHG

## 2023-12-07 DIAGNOSIS — O09.892 SUPERVISION OF OTHER HIGH RISK PREGNANCIES, SECOND TRIMESTER: ICD-10-CM

## 2023-12-07 LAB — BACTERIA GENITAL QL WET PREP: NORMAL

## 2023-12-07 PROCEDURE — 3078F DIAST BP <80 MM HG: CPT | Performed by: PHYSICIAN ASSISTANT

## 2023-12-07 PROCEDURE — 87210 SMEAR WET MOUNT SALINE/INK: CPT | Performed by: PHYSICIAN ASSISTANT

## 2023-12-07 PROCEDURE — 3074F SYST BP LT 130 MM HG: CPT | Performed by: PHYSICIAN ASSISTANT

## 2023-12-07 PROCEDURE — 0502F SUBSEQUENT PRENATAL CARE: CPT | Performed by: PHYSICIAN ASSISTANT

## 2023-12-07 ASSESSMENT — FIBROSIS 4 INDEX: FIB4 SCORE: 0.99

## 2023-12-07 NOTE — PROGRESS NOTES
Pt. Here for OB/FU. Reports baby movement is decreasing a little but still passing kick count sheet.   Good # [phone]  Pt. States having some back pain, and [place].   + Trich flagyl taken 11/14/2023 BLANK today

## 2023-12-07 NOTE — PROGRESS NOTES
Pt has no complaints with cramping, regular or strong UCs, Vb, LOF. +FM. GBS next visit. Pt was seen in L&D as she was told she was exposed to trich by ex boyfriend, last time they had intercourse was 7/4/23 - PAP in 9/23 showed no signs of infection - will flag for potential missed diagnosis at that time. Pt took meds, has much improved symptoms. Wet mt: Neg today for BV, Trich or yeast - pt notified. PTL Precautions reviewed. RTC 2 wk or sooner prn.

## 2023-12-14 ENCOUNTER — HOSPITAL ENCOUNTER (EMERGENCY)
Facility: MEDICAL CENTER | Age: 28
End: 2023-12-14
Attending: OBSTETRICS & GYNECOLOGY | Admitting: OBSTETRICS & GYNECOLOGY
Payer: MEDICAID

## 2023-12-14 VITALS
TEMPERATURE: 97.5 F | HEIGHT: 65 IN | DIASTOLIC BLOOD PRESSURE: 75 MMHG | RESPIRATION RATE: 18 BRPM | WEIGHT: 293 LBS | OXYGEN SATURATION: 95 % | HEART RATE: 85 BPM | SYSTOLIC BLOOD PRESSURE: 122 MMHG | BODY MASS INDEX: 48.82 KG/M2

## 2023-12-14 LAB
APPEARANCE UR: CLEAR
COLOR UR AUTO: YELLOW
GLUCOSE UR QL STRIP.AUTO: NEGATIVE MG/DL
KETONES UR QL STRIP.AUTO: NEGATIVE MG/DL
LEUKOCYTE ESTERASE UR QL STRIP.AUTO: ABNORMAL
NITRITE UR QL STRIP.AUTO: NEGATIVE
PH UR STRIP.AUTO: 6.5 [PH] (ref 5–8)
PROT UR QL STRIP: NEGATIVE MG/DL
RBC UR QL AUTO: NEGATIVE
SP GR UR STRIP.AUTO: <=1.005 (ref 1–1.03)

## 2023-12-14 PROCEDURE — 59025 FETAL NON-STRESS TEST: CPT | Mod: 26 | Performed by: OBSTETRICS & GYNECOLOGY

## 2023-12-14 PROCEDURE — 81002 URINALYSIS NONAUTO W/O SCOPE: CPT

## 2023-12-14 PROCEDURE — 99283 EMERGENCY DEPT VISIT LOW MDM: CPT

## 2023-12-14 PROCEDURE — 99283 EMERGENCY DEPT VISIT LOW MDM: CPT | Mod: 25,GC | Performed by: OBSTETRICS & GYNECOLOGY

## 2023-12-14 PROCEDURE — 59025 FETAL NON-STRESS TEST: CPT

## 2023-12-14 ASSESSMENT — FIBROSIS 4 INDEX: FIB4 SCORE: 0.99

## 2023-12-14 NOTE — PROGRESS NOTES
1310: pt to labor and delivery, pt c/o nausea, dizziness, and cramping for the last 2 days. Pt reports some vaginal spotting when she wipes.  Efm and toco applied. Vss.  Sve FT/50/-3, no blood seen on exam glove.  Pt reports her son had dental surgery recently and she has been stressed and not sleeping well.  Pt given oral hydration.    1400: dr. Hills at bs, discussed poc    1630: pt up to br, pt feeling better. Pt denies nausea, dizziness, or cramping.  Ua done, wnl.  Report to dr. Hills.    1700: dr. Hampton at bs, orders to discharge home. Pt discharged in stable condition. Pt given  labor precautions and encouraged to orally hydrate up to 3 liters per day.

## 2023-12-14 NOTE — ED PROVIDER NOTES
LABOR AND DELIVERY TRIAGE NOTE    PATIENT ID:  NAME:  Mayra Rojas  MRN:               [id number]  YOB: 1995     28 y.o. female  at 34w1d.    Subjective: Pt reports with complaints of lightheadedness and nausea for 2 to 3 days.  She does report that she has been anxious/nervous regarding her son's dental procedure which occurred earlier today.  She also reports that she is been emotional regarding the father of her other children who was not present for his son's dental procedure; they have been apart for roughly a year now.  She also reports that she has had some emotional disturbances regarding the father of her current child who is faked his own suicide and has been name-calling towards her.  She states that at the beginning of this pregnancy she lost 40 pounds but has recently gained back 4 of those pounds and with her history of an eating disorder this is also caused some emotional disturbances.  She otherwise feels well, she reports good fetal movement and denies vaginal bleeding, loss of fluids, abdominal pain, and contractions.  Additionally review of systems is negative for headache, vision changes, dysphagia, chest pain, shortness of breath, vomiting, abdominal pain, dysuria or other urinary changes, constipation, rashes, lower extremity edema.  She reports that she has been drinking 1.9 L of water per day and having 2 square meals plus a snack per day.  She is currently living with her friend, her friend's child, and her own children and taking care of them during the day while the friend is at work.    Pregnancy complicated by trichomonas infection treated with metronidazole.  She also had infrequent prenatal care with a 3-month From  to .    negative for contractions  negative pain   negative for LOF  negative for vaginal bleeding  positive for fetal movement    PNL:  Blood Type AB+, Rubella immune, HIV neg, TrepAb neg, HBsAg NR, GC/CT neg/neg  1 HR GTT:  119  GBS testing not yet indicated      ROS: Patient denies any fever chills, nausea, vomiting, headache, chest pain, shortness of breath, or dysuria or unusual swelling of hands or feet.     PMHx:   Past Medical History:   Diagnosis Date    Allergy     Patient denies medical problems         OB History    Para Term  AB Living   8 3 3   4 3   SAB IAB Ectopic Molar Multiple Live Births   3         3      # Outcome Date GA Lbr Alfonso/2nd Weight Sex Delivery Anes PTL Lv   8 Current            7 AB 22 8w0d             Birth Comments: Pt states no complications.   6 SAB               Birth Comments: Pt states no D/C needed.   5 Term 19 40w0d  4.035 kg (8 lb 14.3 oz) M Vag-Spont EPI N LINDA   4 Term 17 40w0d  3.345 kg (7 lb 6 oz) F Vag-Spont EPI N LINDA      Birth Comments: Pt states no complications   3 SAB  10w0d    SAB         Birth Comments: passed on its own   2 Term 10/30/15 41w0d  3.72 kg (8 lb 3.2 oz) F Vag-Spont EPI N LINDA      Birth Comments: Pt states no complications   1 SAB  5w0d             Birth Comments: no D&C needed       PSHx:  No past surgical history on file.    Social Hx:  Social History     Tobacco Use    Smoking status: Never    Smokeless tobacco: Never   Vaping Use    Vaping Use: Never used   Substance Use Topics    Alcohol use: No    Drug use: Yes     Types: Inhaled     Comment: marijuana          Medications:  No current facility-administered medications on file prior to encounter.     Current Outpatient Medications on File Prior to Encounter   Medication Sig Dispense Refill    ondansetron (ZOFRAN ODT) 4 MG TABLET DISPERSIBLE Take 1 Tablet by mouth every 6 hours as needed for Nausea. (Patient not taking: Reported on 2023) 10 Tablet 0    ibuprofen (MOTRIN) 600 MG Tab Take 1 Tab by mouth every 6 hours as needed (For cramping after delivery; do not give if patient is receiving ketorolac (Toradol)). (Patient not taking: Reported on 3/12/2019) 30 Tab 1     "prenatal plus vitamin (STUARTNATAL 1+1) 27-1 MG Tab tablet Take 1 Tab by mouth every morning. (Patient not taking: Reported on 3/12/2019) 30 Tab 6    Prenatal MV-Min-Fe Fum-FA-DHA (PRENATAL 1 PO) Take  by mouth.         Allergies:  NKDA, is allergic to cauliflower which causes anaphylaxis      Objective:    Vitals:    23 1521   BP: 122/75   Pulse: 85   Resp: 18   Temp: 36.4 °C (97.5 °F)   TempSrc: Temporal   SpO2: 95%   Weight: (!) 152 kg (335 lb)   Height: 1.651 m (5' 5\")     Temp (24hrs), Av.4 °C (97.5 °F), Min:36.4 °C (97.5 °F), Max:36.4 °C (97.5 °F)    General: No acute distress, resting comfortably in bed.  HEENT: normocephalic, nontraumatic, PERRLA, EOMI  Cardiovascular: Heart RRR with no murmurs, rubs or gallops. Radial Pulses 2+  Respiratory: symmetric chest expansion, lungs CTAB, with no wheezes, rales, rhonci on anterior auscultation  Abdomen: gravid, nontender in all quadrants including right upper quadrant  Musculoskeletal: JOSEPH spontaneously, 1+ pitting edema to the lower shin  Neuro: non focal with no numbness, tingling or changes in sensation  Psych: Appropriately tearful while discussing her relationship with the father of her current child and father of her other children.  Also becomes tearful when discussing her history of an eating disorder    Cervix: Fingertip /50%/-3  [place]: No contractions appreciated, irritability  FHRM: Baseline 135 bpm, moderate variability, + accels, 2 variable decel    Labs:    Latest Reference Range & Units 23 16:51   POC Color  Yellow   POC Appearance  Clear   POC Specific Gravity 1.005 - 1.030  <=1.005 !   POC Urine PH 5.0 - 8.0  6.5   POC Glucose Negative mg/dL Negative   POC Ketones Negative mg/dL Negative   POC Protein Negative mg/dL Negative   POC Nitrites Negative  Negative   POC Leukocyte Esterase Negative  Trace !   POC Blood Negative  Negative   !: Data is abnormal    Assessment: 28 y.o. female  at 34w1d presents with lightheadedness and " nausea for 2 to 3 days.  This is in concordance with anxiety relating to her son's dental procedure which occurred today.  She reports that she drinks roughly 1.9 L of water per day is having 2 square meals with 1 snack per day.  We discussed a lot of emotional concerns relating to the father of her current child and the father of her previous children, in addition to emotions surrounding her history of eating disorder and her recent weight gain of 4 pounds in the context of losing 40 pounds at the beginning of this pregnancy.  She understands that this is a supportive environment.  We also discussed the importance of proper hydration and that she has been needs to increase her water intake each day.  Upon presentation to triage she had about 1 L of water before being able to produce a urine sample.    #SIUP @ 34w1d by 8w US  #Dehydration  Patient reports that she drinks 1.9 L of water per day.  She is also having 2 square meals per day with 1 snack.  We discussed the importance of her increasing her fluid intake to at least 2 L/day but 3 L is likely more reasonable given her habitus.  She displayed understanding of this.  Prior to collecting UA patient was unable to urinate and drank roughly 1 L of water in triage before producing UA.  Following that 1 L oral bolus she stated that she was feeling much better, no lightheadedness, and nausea was also improved. UA showed trace LE and SG <=1.005 with yellow color. Vital signs within normal limits in triage.   -Cont. Prenatal care with [facility], next appointment on 12/22  -Continue fetal kick counts  -Drink at least 2 L a day of water, 3 L is a better goal  -30 minutes of exercise per day  -Return precautions discussed, patient demonstrated understanding    #Fetal status   - FHT: cat 1    #GBS status  -GBS: To be gathered at a later date     #Rubella immune, HIV neg, TrepAb neg, HBsAg NR, GC/CT neg/neg    #healthcare maintenance   -TdaP given 11/8/2023      - Patient is  cleared to return home with family. Encouraged to see MD/DO for increased painful uterine contractions @ 3-5, vaginal bleeding, loss of fluid, or other serious symptoms.      Discussed case with Dr. Hampton, [facility] Attending. Case was discussed and attending agreed with plan prior to discharge of patient.      Felipe Hills M.D.  PGY-1, [facility]

## 2023-12-22 ENCOUNTER — ROUTINE PRENATAL (OUTPATIENT)
Dept: OBGYN | Facility: CLINIC | Age: 28
End: 2023-12-22
Payer: MEDICAID

## 2023-12-22 ENCOUNTER — APPOINTMENT (OUTPATIENT)
Dept: RADIOLOGY | Facility: IMAGING CENTER | Age: 28
End: 2023-12-22
Payer: MEDICAID

## 2023-12-22 VITALS — WEIGHT: 293 LBS | SYSTOLIC BLOOD PRESSURE: 110 MMHG | BODY MASS INDEX: 55.91 KG/M2 | DIASTOLIC BLOOD PRESSURE: 62 MMHG

## 2023-12-22 DIAGNOSIS — O09.93 HIGH-RISK PREGNANCY IN THIRD TRIMESTER: ICD-10-CM

## 2023-12-22 DIAGNOSIS — O99.212 OBESITY AFFECTING PREGNANCY IN SECOND TRIMESTER, UNSPECIFIED OBESITY TYPE: ICD-10-CM

## 2023-12-22 PROBLEM — O99.213 OBESITY AFFECTING PREGNANCY IN THIRD TRIMESTER: Status: ACTIVE | Noted: 2023-09-19

## 2023-12-22 PROBLEM — O09.90 HIGH-RISK PREGNANCY: Status: ACTIVE | Noted: 2023-09-19

## 2023-12-22 PROCEDURE — 3074F SYST BP LT 130 MM HG: CPT

## 2023-12-22 PROCEDURE — 76816 OB US FOLLOW-UP PER FETUS: CPT | Mod: TC

## 2023-12-22 PROCEDURE — 3078F DIAST BP <80 MM HG: CPT

## 2023-12-22 PROCEDURE — 0502F SUBSEQUENT PRENATAL CARE: CPT

## 2023-12-22 ASSESSMENT — FIBROSIS 4 INDEX: FIB4 SCORE: 0.99

## 2023-12-22 NOTE — PROGRESS NOTES
OB follow up   + fetal movement.  No VB, LOF or UC's.  Phone # [phone] (home)   Preferred pharmacy confirmed.  Flu vaccine offered declined     Pt would like to know if she can get her cervix checked. Has been feeling back pain and abdomin pain since yesterday.

## 2023-12-22 NOTE — PROGRESS NOTES
S: Pt is a 28 y.o.  at 35w2d gestation here today for routine prenatal care. Reports feeling well, though has had significant back pain over the last few days that comes and goes.     Pt reports fetal movement; denies cramping, vaginal bleeding. Reports having lots of vaginal wetness/discharge that is consistent with her last appointment. Denies dysuria or s/s UTI. Denies headache, visual changes, or epigastric pain.       O: /62   Wt (!) 336 lb    *see prenatal flowsheet*     Labs:        GCT: 119        Ultrasound: Growth US to be completed today    No CVA tenderness    A: IUP at 35w2d  See US         Patient Active Problem List    Diagnosis Date Noted    Supervision of other high risk pregnancies, second trimester 2023    Obesity affecting pregnancy in second trimester 2023    History of macrosomia in infant in prior pregnancy, currently pregnant 2023    Exposure to potential infection - Trich tx , BLANK neg 2023    Vaginal bleeding 2019    Body mass index (BMI) of 50-59.9 in adult (HCC) 2019          TDAP: yes       FLU: no        IOL or C/S scheduled: no      P:        Discussed normal s/sx pregnancy appropriate for gestational age and labor warning signs vs general discomforts. Reviewed fetal movements appropriate for EGA and kick counts. Reinforced adequate hydration and nutrition.       Growth US ordered today d/t high BMI       Plan on NSTs weekly starting NV       Collect GBS at NV       RTC in 1 week for routine prenatal care      Berenice Omalley C.N.M.

## 2023-12-29 ENCOUNTER — NON-PROVIDER VISIT (OUTPATIENT)
Dept: OBGYN | Facility: CLINIC | Age: 28
End: 2023-12-29
Payer: MEDICAID

## 2023-12-29 ENCOUNTER — ROUTINE PRENATAL (OUTPATIENT)
Dept: OBGYN | Facility: CLINIC | Age: 28
End: 2023-12-29
Payer: MEDICAID

## 2023-12-29 ENCOUNTER — HOSPITAL ENCOUNTER (OUTPATIENT)
Facility: MEDICAL CENTER | Age: 28
End: 2023-12-29
Payer: MEDICAID

## 2023-12-29 VITALS — WEIGHT: 293 LBS | SYSTOLIC BLOOD PRESSURE: 96 MMHG | BODY MASS INDEX: 55.91 KG/M2 | DIASTOLIC BLOOD PRESSURE: 63 MMHG

## 2023-12-29 DIAGNOSIS — Q27.0 TWO VESSEL UMBILICAL CORD: ICD-10-CM

## 2023-12-29 DIAGNOSIS — O09.90 HIGH-RISK PREGNANCY: ICD-10-CM

## 2023-12-29 DIAGNOSIS — O32.0XX1 UNSTABLE FETAL LIE, FETUS 1 OF MULTIPLE GESTATION: ICD-10-CM

## 2023-12-29 PROBLEM — O32.0XX0 UNSTABLE LIE OF FETUS: Status: ACTIVE | Noted: 2023-12-29

## 2023-12-29 PROCEDURE — 87150 DNA/RNA AMPLIFIED PROBE: CPT

## 2023-12-29 PROCEDURE — 3074F SYST BP LT 130 MM HG: CPT

## 2023-12-29 PROCEDURE — 0502F SUBSEQUENT PRENATAL CARE: CPT

## 2023-12-29 PROCEDURE — 3078F DIAST BP <80 MM HG: CPT

## 2023-12-29 PROCEDURE — 87081 CULTURE SCREEN ONLY: CPT

## 2023-12-29 ASSESSMENT — FIBROSIS 4 INDEX: FIB4 SCORE: 0.99

## 2023-12-29 NOTE — PROGRESS NOTES
S: Pt is a 28 y.o.  at 36w2d gestation here today for routine prenatal care. Reports feeling lots of pressure in her pelvis. Concerns today include ultrasound report showing two-vessel cord and baby being head-up at her last US.     Pt reports fetal movement; denies cramping, vaginal bleeding, and leaking of fluid. Denies dysuria. Denies headache, visual changes, or epigastric pain.       O: BP 96/63   Wt (!) 336 lb    *see prenatal flowsheet*     Labs:        GCT: 119        AFP: Not Examined       GBS: collected today    Ultrasound:   : breech, EVELYN 17.0 cm, EFW 76.8%, two-vessel cord noted on US    A: IUP at 36w2d       S>D       NST reactive       Vertex confirmed by BSUS         Patient Active Problem List    Diagnosis Date Noted    Two vessel umbilical cord 2023    High-risk pregnancy 2023    Obesity affecting pregnancy in third trimester 2023    History of macrosomia in infant in prior pregnancy, currently pregnant 2023    Exposure to potential infection - Trich tx , BLANK neg 2023    Vaginal bleeding 2019    Body mass index (BMI) of 50-59.9 in adult (HCC) 2019            IOL or C/S scheduled: no      P:   -Discussed normal s/sx pregnancy appropriate for gestational age and labor warning signs vs general discomforts. Reviewed fetal movements appropriate for EGA and kick counts. Reinforced adequate hydration and nutrition.  -NST performed and reviewed today  -NIPT offered d/t two-vessel cord, patient will collect today; discussed findings, growth appropriate; no f/u per VIRY Dickey  -Vertex presentation confirmed by BSUS  -RTC in 1 weeks for routine prenatal care      Berenice Omalley C.N.M.

## 2023-12-29 NOTE — PROGRESS NOTES
Pt. Here for OB/FU.   Reports Good FM.   Pt. Denies VB, LOF, or UC's.   Chaperone offered.   GBS today.   Pt states she would like to go over her US results.

## 2023-12-30 ENCOUNTER — HOSPITAL ENCOUNTER (EMERGENCY)
Facility: MEDICAL CENTER | Age: 28
End: 2023-12-30
Attending: OBSTETRICS & GYNECOLOGY | Admitting: OBSTETRICS & GYNECOLOGY
Payer: MEDICAID

## 2023-12-30 VITALS
BODY MASS INDEX: 48.82 KG/M2 | HEIGHT: 65 IN | SYSTOLIC BLOOD PRESSURE: 108 MMHG | DIASTOLIC BLOOD PRESSURE: 62 MMHG | WEIGHT: 293 LBS | TEMPERATURE: 96 F | HEART RATE: 88 BPM | RESPIRATION RATE: 20 BRPM | OXYGEN SATURATION: 96 %

## 2023-12-30 PROCEDURE — 99282 EMERGENCY DEPT VISIT SF MDM: CPT | Mod: 25,GC | Performed by: OBSTETRICS & GYNECOLOGY

## 2023-12-30 PROCEDURE — 59025 FETAL NON-STRESS TEST: CPT | Mod: 26 | Performed by: OBSTETRICS & GYNECOLOGY

## 2023-12-30 PROCEDURE — 59025 FETAL NON-STRESS TEST: CPT

## 2023-12-30 PROCEDURE — 99283 EMERGENCY DEPT VISIT LOW MDM: CPT

## 2023-12-30 ASSESSMENT — FIBROSIS 4 INDEX: FIB4 SCORE: 0.99

## 2023-12-30 NOTE — PROGRESS NOTES
"1459: Pt BIB [facility] with c/o \"sharp stabbing pains\" in her stomach. Pt is a   at 36.3 weeks gestation. Pt to triage, VSS, abdomen soft to palpation, SVE 1/soft/ unable to determine presentation. Dr. Khan updated.  "

## 2023-12-31 LAB — GP B STREP DNA SPEC QL NAA+PROBE: NEGATIVE

## 2023-12-31 NOTE — PROGRESS NOTES
1600-Report received from Jenna MCCLELLAND. POC discussed, all questions have been answered at this time, care assumed.     1610-This RN updated Dr. Khan on pt status/VS/FHT/ctx, FHT reviewed, provider to visit at bedside.     1620-Dr. Khan and this RN at bedside with US to confirm vtx presentation, vtx confirmed. POC discussed, orders received to re-check cervix.     1630-SVE as charted.     1800-This RN updated Dr. Bejarano on pt status/SVE/VS/FHT/ctx, POC discussed, discharge orders received.

## 2023-12-31 NOTE — ED PROVIDER NOTES
"LABOR & DELIVERY TRIAGE HISTORY AND PHYSICAL  Patient Name: Mayra Rojas Age/Sex: 28 y.o. female  : 1995 MRN: [id number]      HISTORY OF PRESENT ILLNESS:   Mayra Rojas is a 28 y.o.  at 36w3d weeks gestation by 8w5d ultrasound who is here for sudden onset \"stabbing pain\" in her abdomen around 1400 today.     She reports that this pain is throughout her abdomen and radiates to her back. She thinks they are like contractions. She reports this pain was spaced every 2 minutes, now she thinks it is about the same. She describes it as more severe cramps.    She denies RUQ pain or heartburn.    Patient denies sexually intercourse in the last 48 hours.   Hydration: she drinks at least gallon a day     Fetal movement: present   Leakage of Fluid: denies   Vaginal Bleeding: denies   Contractions: present q2-5 minutes     Her EDC is 2024, by Ultrasound.   She has received prenatal care with [facility].  Complications during pregnancy:   - Breech presentation at 35w appointment   - Obesity     History of STD: trichomonas during pregnancy BLANK       OBSTETRICAL HISTORY:    OB History    Para Term  AB Living   8 3 3   4 3   SAB IAB Ectopic Molar Multiple Live Births   3         3      # Outcome Date GA Lbr Alfonso/2nd Weight Sex Delivery Anes PTL Lv   8 Current            7 AB 22 8w0d             Birth Comments: Pt states no complications.   6 SAB               Birth Comments: Pt states no D/C needed.   5 Term 19 40w0d  4.035 kg (8 lb 14.3 oz) M Vag-Spont EPI N LINDA   4 Term 17 40w0d  3.345 kg (7 lb 6 oz) F Vag-Spont EPI N LINDA      Birth Comments: Pt states no complications   3 SAB  10w0d    SAB         Birth Comments: passed on its own   2 Term 10/30/15 41w0d  3.72 kg (8 lb 3.2 oz) F Vag-Spont EPI N LINDA      Birth Comments: Pt states no complications   1 2013 5w0d             Birth Comments: no D&C needed       MEDICAL HISTORY:  Past Medical " "History:   Diagnosis Date    Allergy     Patient denies medical problems        SURGICAL HISTORY:  No past surgical history on file.    MEDICATIONS:  Medications Prior to Admission   Medication Sig Dispense Refill Last Dose    Prenatal MV-Min-Fe Fum-FA-DHA (PRENATAL 1 PO) Take  by mouth.          ALLERGIES:  Allergies   Allergen Reactions    Cauliflower [Brassica Oleracea Italica] Anaphylaxis    Nkda [No Known Drug Allergy]         SOCIAL HISTORY:   Tobacco use: denies   Alcohol use: denies   Recreational drug use: denies    reports that she has never smoked. She has never used smokeless tobacco. She reports current drug use. Drug: Inhaled. She reports that she does not drink alcohol.    FAMILY HISTORY  Clotting/bleeding disorders: denies   Gynecological cancers: cervical, ovarian, uterus cancer in maternal granmother  Family History   Problem Relation Age of Onset    Hypertension Mother     Multiple Sclerosis Sister     Other Sister         Pt states both her sisters have endometriosis    No Known Problems Brother     Diabetes Maternal Grandmother     Cancer Maternal Grandmother         breast       REVIEW OF SYSTEMS:  Pertinent items are noted in HPI.      PHYSICAL EXAM:  Temp:  [35.6 °C (96 °F)] 35.6 °C (96 °F)  Pulse:  [88] 88  Resp:  [20] 20  BP: (108)/(62) 108/62  SpO2:  [96 %] 96 %  Body mass index is 55.91 kg/m².    General:  AAOx3, NAD,    CV: RRR, no M/R/G   Pulmonary:  CTAB, normal effort   Abdomen: Obese, soft and non-tender, gravid uterus   FHT: 130s/ moderate variability/ + Accels/ no decels    Contractions: Irregular and some irritability    Presentation: Cephalic    SVE: 1/soft and posterior      Bedside Ultrasound:   Cephalic presentation     Prenatal Labs:  HepBSAg NR  HIV NR  RPR NR  Rubella immune   ABO AB +    Group B Strep: in process      ASSESSMENT/ PLAN:   Mayra Rojas is a 28 y.o.  at 36w3d weeks gestation by 8w5d ultrasound who is here for sudden onset \"stabbing " "pain\" in her abdomen around 1400 today.     #SIUP at 36w3d  Prenatal care with [facility]     #Fetal status   Cat 1 tracing, reactive     #abdominal pain   Likely contractions given toco   Patient not in active labor as no cervical change and irregular contractions     Given patient is not making cervical change she can discharge with labor return precautions.    Discussed case with Dr. Bejarano, [facility] attending     Berenice Khan MD   PGY-1 Resident   [facility]     "

## 2024-01-10 ENCOUNTER — HOSPITAL ENCOUNTER (OUTPATIENT)
Facility: MEDICAL CENTER | Age: 29
End: 2024-01-10
Attending: NURSE PRACTITIONER
Payer: MEDICAID

## 2024-01-10 ENCOUNTER — ROUTINE PRENATAL (OUTPATIENT)
Dept: OBGYN | Facility: CLINIC | Age: 29
End: 2024-01-10
Payer: MEDICAID

## 2024-01-10 VITALS — WEIGHT: 293 LBS | SYSTOLIC BLOOD PRESSURE: 100 MMHG | DIASTOLIC BLOOD PRESSURE: 68 MMHG | BODY MASS INDEX: 56.25 KG/M2

## 2024-01-10 DIAGNOSIS — O09.93 ENCOUNTER FOR SUPERVISION OF HIGH RISK PREGNANCY IN THIRD TRIMESTER, ANTEPARTUM: Primary | ICD-10-CM

## 2024-01-10 PROCEDURE — 0502F SUBSEQUENT PRENATAL CARE: CPT | Performed by: NURSE PRACTITIONER

## 2024-01-10 PROCEDURE — 3074F SYST BP LT 130 MM HG: CPT | Performed by: NURSE PRACTITIONER

## 2024-01-10 PROCEDURE — 59025 FETAL NON-STRESS TEST: CPT | Performed by: NURSE PRACTITIONER

## 2024-01-10 PROCEDURE — 3078F DIAST BP <80 MM HG: CPT | Performed by: NURSE PRACTITIONER

## 2024-01-10 PROCEDURE — 36415 COLL VENOUS BLD VENIPUNCTURE: CPT

## 2024-01-10 PROCEDURE — 82239 BILE ACIDS TOTAL: CPT

## 2024-01-10 ASSESSMENT — FIBROSIS 4 INDEX: FIB4 SCORE: 0.99

## 2024-01-10 NOTE — PROGRESS NOTES
OB follow up   + fetal movement.  No VB, LOF or UC's.  Phone # [phone]  Preferred pharmacy confirmed.  Kick count sheet given today.  Wants cervical check today  C/o itching all over body worse this week, unable to sleep because of that.

## 2024-01-10 NOTE — LETTER
"Count Your Baby's Movements  Another step to a healthy delivery    Mayra Bob             Dept: [phone]    How Many Weeks Pregnant= 28w0d    Date to Begin Countin/10/24              How to use this chart    One way for your physician to keep track of your baby's health is by knowing how often the baby moves (or \"kicks\") in your womb.  You can help your physician to do this by using this chart every day.    Every day, you should see how many hours it takes for your baby to move 10 times.  Start in the morning, as soon as you get up.    First, write down the time your baby moves until you get to 10.  Check off one box every time your baby moves until you get to 10.  Write down the time you finished counting in the last column.  Total how long it took to count up all 10 movements.  Finally, fill in the box that shows how long this took.  After counting 10 movements, you no longer have to count any more that day.  The next morning, just start counting again as soon as you get up.    What should you call a \"movement\"?  It is hard to say, because it will feel different from one mother to another and from one pregnancy to the next.  The important thing is that you count the movements the same way throughout your pregnancy.  If you have more questions, you should ask your physician.    Count carefully every day!  SAMPLE:  Week 28    How many hours did it take to feel 10 movements?       Start  Time     1     2     3     4     5     6     7     8     9     10   Finish Time   Mon 8:20           11:40                  Sat               Sun                 IMPORTANT: You should contact your physician if it takes more than two hours for you to feel 10 movements.  Each morning, write down the time and start to count the movements of your baby.  Keep track by checking off one box every time you feel one movement.  When you have felt 10 \"kicks\", write down the " time you finished counting in the last column.  Then fill in the   box (over the check joe) for the number of hours it took.  Be sure to read the complete instructions on the previous page.

## 2024-01-10 NOTE — PROGRESS NOTES
S:  Pt is  at 38w0d here for routine OB follow up.  Reports [place]. Also reports itching on back, abd, and feet, rebecca at night. No ED or hospital visits since last seen. Reports good FM.  Denies VB, LOF, RUCs, or vaginal DC.     O:  See above vitals        Fetal position: cephalic/oblique. Questionable unstable lie        GBS negative  -- reviewed w pt.      Limited US on 2023 4:16 PM     HISTORY/REASON FOR EXAM:  BMI obesity, limited PNC, growth.     TECHNIQUE/EXAM DESCRIPTION: OB limited ultrasound.     COMPARISON:  2023 OB complete ultrasound.     FINDINGS:  Fetal Lie:  Breech     Placenta (Location):  Posterior  Placenta Previa: No  Placental stGstrstastdstest:st st1st Amniotic Fluid Volume:  EVELYN = 17.0 cm     Fetal Heart Rate:  159 bpm     Cervical Length:  5.7 cm transvaginal     No maternal adnexal mass is identified.     Fetal Biometry  BPD    8.71 cm, 35 weeks 1 day, (46.9%)  HC    33.06 cm, 37 weeks 5 days, (72.6%)  AC    32.91 cm, 36 weeks 6 days, (89.7%)  Femur Length    6.99 cm, 35 weeks 6 days, (55.3%)  Humerus Length    6.49 cm, 37 weeks 5 days, (98.1%)     EGA by this US:  36 weeks 4 days  KATLYN by this US: 1/15/2024  KATLYN by [facility] US:  2024     Estimated Fetal Weight:  2949 grams  EFW Percentile: 76.8%     Comments:  There is an incidental 2 vessel umbilical cord noted.     IMPRESSION:     1.  Single intrauterine pregnancy of an estimated gestational age of 36 weeks 4 days with an estimated date of delivery of 1/15/2024.       A:  IUP at 38w0d  Patient Active Problem List    Diagnosis Date Noted    Two vessel umbilical cord 2023    Unstable lie of fetus 2023    High-risk pregnancy 2023    Obesity affecting pregnancy in third trimester 2023    History of macrosomia in infant in prior pregnancy, currently pregnant 2023    Exposure to potential infection - Trich tx , BLANK neg 2023    Vaginal bleeding 2019    Body mass index (BMI) of 50-59.9 in  adult ([facility]) 01/09/2019       P:  1.  Anticipatory guidance given         2.  Labor precautions given.  Instructions given on where to go.  Pt receptive to education.         3.  D/w pt IOL policy.  IOL for 39 wk sent.        4.  Questions answered.         5.  Encouraged adequate water intake, walking 30-60 min daily, pelvic rocking, birthing ball       6.  F/u 1wk       7.  NST for 2VC. Reactive per my read       8. Orders for bile acids sent. Pt advised to use Benadryl for itching

## 2024-01-12 LAB — BILE AC SERPL-SCNC: 3 UMOL/L (ref 0–10)

## 2024-01-15 ENCOUNTER — HOSPITAL ENCOUNTER (EMERGENCY)
Facility: MEDICAL CENTER | Age: 29
End: 2024-01-15
Attending: OBSTETRICS & GYNECOLOGY | Admitting: OBSTETRICS & GYNECOLOGY
Payer: MEDICAID

## 2024-01-15 ENCOUNTER — APPOINTMENT (OUTPATIENT)
Dept: OBGYN | Facility: CLINIC | Age: 29
End: 2024-01-15
Payer: MEDICAID

## 2024-01-15 ENCOUNTER — APPOINTMENT (OUTPATIENT)
Dept: RADIOLOGY | Facility: MEDICAL CENTER | Age: 29
End: 2024-01-15
Attending: STUDENT IN AN ORGANIZED HEALTH CARE EDUCATION/TRAINING PROGRAM
Payer: MEDICAID

## 2024-01-15 ENCOUNTER — NON-PROVIDER VISIT (OUTPATIENT)
Dept: OBGYN | Facility: CLINIC | Age: 29
End: 2024-01-15
Payer: MEDICAID

## 2024-01-15 VITALS — DIASTOLIC BLOOD PRESSURE: 57 MMHG | SYSTOLIC BLOOD PRESSURE: 101 MMHG | BODY MASS INDEX: 57.01 KG/M2 | WEIGHT: 293 LBS

## 2024-01-15 VITALS
RESPIRATION RATE: 16 BRPM | SYSTOLIC BLOOD PRESSURE: 118 MMHG | BODY MASS INDEX: 48.82 KG/M2 | HEIGHT: 65 IN | HEART RATE: 96 BPM | WEIGHT: 293 LBS | DIASTOLIC BLOOD PRESSURE: 73 MMHG | OXYGEN SATURATION: 97 % | TEMPERATURE: 96.9 F

## 2024-01-15 DIAGNOSIS — Q27.0 TWO VESSEL UMBILICAL CORD: Primary | ICD-10-CM

## 2024-01-15 LAB
NST ACOUSTIC STIMULATION: NORMAL
NST ACTION NECESSARY: NORMAL
NST ASSESSMENT: NORMAL
NST BASELINE: NORMAL
NST INDICATIONS: NORMAL
NST OTHER DATA: NORMAL
NST READ BY: NORMAL
NST RETURN: NORMAL
NST UTERINE ACTIVITY: NORMAL

## 2024-01-15 PROCEDURE — 99284 EMERGENCY DEPT VISIT MOD MDM: CPT

## 2024-01-15 PROCEDURE — 99282 EMERGENCY DEPT VISIT SF MDM: CPT | Performed by: STUDENT IN AN ORGANIZED HEALTH CARE EDUCATION/TRAINING PROGRAM

## 2024-01-15 PROCEDURE — 59025 FETAL NON-STRESS TEST: CPT

## 2024-01-15 PROCEDURE — 59025 FETAL NON-STRESS TEST: CPT | Performed by: NURSE PRACTITIONER

## 2024-01-15 PROCEDURE — 76819 FETAL BIOPHYS PROFIL W/O NST: CPT

## 2024-01-15 ASSESSMENT — FIBROSIS 4 INDEX
FIB4 SCORE: 0.99
FIB4 SCORE: 0.99

## 2024-01-15 ASSESSMENT — PAIN SCALES - GENERAL: PAINLEVEL: 0 - NO PAIN

## 2024-01-15 NOTE — ED PROVIDER NOTES
OB ED EVALUATION NOTE    SUBJECTIVE:  Mayra Rojas is a 28 y.o.,  at 38w5d who presents for extended monitoring and/or BPP after variable decelerations were noted during NST in office. NSTs for two-vessel umbilical cord. She denies vaginal bleeding, leakage of fluid, or contractions. She states the baby is moving.     I personally reviewed the past medical and surgical histories, as well as the problem list.    Patient Active Problem List   Diagnosis    Body mass index (BMI) of 50-59.9 in adult (HCC)    Vaginal bleeding    High-risk pregnancy    Obesity affecting pregnancy in third trimester    History of macrosomia in infant in prior pregnancy, currently pregnant    Exposure to potential infection - Trich tx , BLANK neg     Two vessel umbilical cord    Unstable lie of fetus         OBJECTIVE:  Vital Signs:   Vitals:    01/15/24 1120   BP: 118/73   Pulse: 96   Resp: 16   Temp: 36.1 °C (96.9 °F)   SpO2: 97%     GEN: NAD, alert and conversant  Resp: unlabored, symmetric chest rise  Abd: soft, NT, gravid  Ext: no edema      NST read: 130/moderate/accelerations present/few variable decelerations  [place]: irregular ctx, not perceived by patient    LABS / IMAGING:    Biophysical profile:  8/8 with EVELYN 9.16 cm, transverse lie (not in report but noted in images)    ASSESSMENT AND PLAN:  28 y.o.  at 38w5d presenting for further assessment of fetal well-being after variable decelerations were noted during assessment in office.  NST: reactive but with rare variable decelerations  BPP: 8/8 with normal EVELYN at 9.16 cm  Transverse lie on BPP. Patient previously scheduled for IOL at 39w0d; transitioned scheduling to primary C/S for same day (2024).     The patient was instructed to follow up for C/S on 24. Scheduling team will contact her to give pre-op instructions and confirm time of surgery. Return if she experiences contractions, LOF, VB, or decreased FM.     Nat Menon,  M.D.

## 2024-01-15 NOTE — PROGRESS NOTES
, EDC , GA 38w5d. Pt presents to L&D from office for monitoring following variable decels. Pt denies LOF, VB, or UCs and reports + fetal movement. Pt escorted to triage room, oriented to room and unit, and external monitors applied. POC discussed with pt and encouraged to state needs or questions at any time.    1140 Dr. Menon given report, orders received.    1226 Ultrasonographer at bedside, monitors removed for exam.    1401 Dr. Menon updated on BPP.    1415 Dr. Menon at bedside, POC discussed with pt, D/C order received.    1510 L&D D/C instructions reviewed with pt who states understanding. Pt d/c to self.

## 2024-01-15 NOTE — PROGRESS NOTES
Pt is a 28 y.o.  at 38w5d with  Estimated Date of Delivery: 24 who presents for scheduled NST.      Indication for: BMI 57.01, 2VC    FHR baseline: 145  Variability: moderate  Accelerations: no  Decelerations: variables  VAS: no  TOCO: no CTX noted    NST equivocal per my read      Pt instructed to present to L&d for NST and BPP. Report to Dr. Hampton and charge RN

## 2024-01-17 ENCOUNTER — ANESTHESIA (OUTPATIENT)
Dept: ANESTHESIOLOGY | Facility: MEDICAL CENTER | Age: 29
DRG: 854 | End: 2024-01-17
Payer: MEDICAID

## 2024-01-17 ENCOUNTER — HOSPITAL ENCOUNTER (INPATIENT)
Facility: MEDICAL CENTER | Age: 29
LOS: 2 days | DRG: 854 | End: 2024-01-19
Attending: OBSTETRICS & GYNECOLOGY | Admitting: OBSTETRICS & GYNECOLOGY
Payer: MEDICAID

## 2024-01-17 LAB
BASOPHILS # BLD AUTO: 0.4 % (ref 0–1.8)
BASOPHILS # BLD: 0.03 K/UL (ref 0–0.12)
EOSINOPHIL # BLD AUTO: 0.07 K/UL (ref 0–0.51)
EOSINOPHIL NFR BLD: 1 % (ref 0–6.9)
ERYTHROCYTE [DISTWIDTH] IN BLOOD BY AUTOMATED COUNT: 40 FL (ref 35.9–50)
HCT VFR BLD AUTO: 38.4 % (ref 37–47)
HGB BLD-MCNC: 12.9 G/DL (ref 12–16)
HOLDING TUBE BB 8507: NORMAL
IMM GRANULOCYTES # BLD AUTO: 0.03 K/UL (ref 0–0.11)
IMM GRANULOCYTES NFR BLD AUTO: 0.4 % (ref 0–0.9)
LYMPHOCYTES # BLD AUTO: 2.15 K/UL (ref 1–4.8)
LYMPHOCYTES NFR BLD: 29.2 % (ref 22–41)
MCH RBC QN AUTO: 28.7 PG (ref 27–33)
MCHC RBC AUTO-ENTMCNC: 33.6 G/DL (ref 32.2–35.5)
MCV RBC AUTO: 85.3 FL (ref 81.4–97.8)
MONOCYTES # BLD AUTO: 0.25 K/UL (ref 0–0.85)
MONOCYTES NFR BLD AUTO: 3.4 % (ref 0–13.4)
NEUTROPHILS # BLD AUTO: 4.83 K/UL (ref 1.82–7.42)
NEUTROPHILS NFR BLD: 65.6 % (ref 44–72)
NRBC # BLD AUTO: 0 K/UL
NRBC BLD-RTO: 0 /100 WBC (ref 0–0.2)
PLATELET # BLD AUTO: 250 K/UL (ref 164–446)
PMV BLD AUTO: 9.6 FL (ref 9–12.9)
RBC # BLD AUTO: 4.5 M/UL (ref 4.2–5.4)
T PALLIDUM AB SER QL IA: NORMAL
WBC # BLD AUTO: 7.4 K/UL (ref 4.8–10.8)

## 2024-01-17 PROCEDURE — 700111 HCHG RX REV CODE 636 W/ 250 OVERRIDE (IP): Mod: JZ

## 2024-01-17 PROCEDURE — 700101 HCHG RX REV CODE 250: Performed by: ANESTHESIOLOGY

## 2024-01-17 PROCEDURE — 36415 COLL VENOUS BLD VENIPUNCTURE: CPT

## 2024-01-17 PROCEDURE — 700105 HCHG RX REV CODE 258: Performed by: OBSTETRICS & GYNECOLOGY

## 2024-01-17 PROCEDURE — 700111 HCHG RX REV CODE 636 W/ 250 OVERRIDE (IP): Performed by: ANESTHESIOLOGY

## 2024-01-17 PROCEDURE — 86780 TREPONEMA PALLIDUM: CPT

## 2024-01-17 PROCEDURE — 303615 HCHG EPIDURAL/SPINAL ANESTHESIA FOR LABOR

## 2024-01-17 PROCEDURE — 85025 COMPLETE CBC W/AUTO DIFF WBC: CPT

## 2024-01-17 PROCEDURE — 700111 HCHG RX REV CODE 636 W/ 250 OVERRIDE (IP): Mod: JZ | Performed by: OBSTETRICS & GYNECOLOGY

## 2024-01-17 PROCEDURE — 770002 HCHG ROOM/CARE - OB PRIVATE (112)

## 2024-01-17 RX ORDER — LIDOCAINE HYDROCHLORIDE 10 MG/ML
20 INJECTION, SOLUTION INFILTRATION; PERINEURAL
Status: DISCONTINUED | OUTPATIENT
Start: 2024-01-17 | End: 2024-01-18 | Stop reason: HOSPADM

## 2024-01-17 RX ORDER — CEFAZOLIN SODIUM 1 G/3ML
2 INJECTION, POWDER, FOR SOLUTION INTRAMUSCULAR; INTRAVENOUS ONCE
Status: DISCONTINUED | OUTPATIENT
Start: 2024-01-17 | End: 2024-01-17

## 2024-01-17 RX ORDER — EPHEDRINE SULFATE 50 MG/ML
5 INJECTION, SOLUTION INTRAVENOUS
Status: DISCONTINUED | OUTPATIENT
Start: 2024-01-17 | End: 2024-01-18 | Stop reason: HOSPADM

## 2024-01-17 RX ORDER — SODIUM CHLORIDE, SODIUM LACTATE, POTASSIUM CHLORIDE, CALCIUM CHLORIDE 600; 310; 30; 20 MG/100ML; MG/100ML; MG/100ML; MG/100ML
INJECTION, SOLUTION INTRAVENOUS CONTINUOUS
Status: DISCONTINUED | OUTPATIENT
Start: 2024-01-17 | End: 2024-01-19 | Stop reason: HOSPADM

## 2024-01-17 RX ORDER — ROPIVACAINE HYDROCHLORIDE 2 MG/ML
INJECTION, SOLUTION EPIDURAL; INFILTRATION; PERINEURAL CONTINUOUS
Status: DISCONTINUED | OUTPATIENT
Start: 2024-01-17 | End: 2024-01-19 | Stop reason: HOSPADM

## 2024-01-17 RX ORDER — METOCLOPRAMIDE HYDROCHLORIDE 5 MG/ML
10 INJECTION INTRAMUSCULAR; INTRAVENOUS ONCE
Status: DISCONTINUED | OUTPATIENT
Start: 2024-01-17 | End: 2024-01-17

## 2024-01-17 RX ORDER — OXYTOCIN 10 [USP'U]/ML
10 INJECTION, SOLUTION INTRAMUSCULAR; INTRAVENOUS
Status: DISCONTINUED | OUTPATIENT
Start: 2024-01-17 | End: 2024-01-18 | Stop reason: HOSPADM

## 2024-01-17 RX ORDER — CITRIC ACID/SODIUM CITRATE 334-500MG
30 SOLUTION, ORAL ORAL ONCE
Status: DISCONTINUED | OUTPATIENT
Start: 2024-01-17 | End: 2024-01-17

## 2024-01-17 RX ORDER — OXYTOCIN 10 [USP'U]/ML
10 INJECTION, SOLUTION INTRAMUSCULAR; INTRAVENOUS
Status: CANCELLED | OUTPATIENT
Start: 2024-01-17

## 2024-01-17 RX ORDER — IBUPROFEN 800 MG/1
800 TABLET ORAL
Status: COMPLETED | OUTPATIENT
Start: 2024-01-17 | End: 2024-01-18

## 2024-01-17 RX ORDER — SODIUM CHLORIDE, SODIUM LACTATE, POTASSIUM CHLORIDE, AND CALCIUM CHLORIDE .6; .31; .03; .02 G/100ML; G/100ML; G/100ML; G/100ML
1000 INJECTION, SOLUTION INTRAVENOUS
Status: DISCONTINUED | OUTPATIENT
Start: 2024-01-17 | End: 2024-01-18 | Stop reason: HOSPADM

## 2024-01-17 RX ORDER — TERBUTALINE SULFATE 1 MG/ML
0.25 INJECTION, SOLUTION SUBCUTANEOUS
Status: DISCONTINUED | OUTPATIENT
Start: 2024-01-17 | End: 2024-01-18 | Stop reason: HOSPADM

## 2024-01-17 RX ORDER — ROPIVACAINE HYDROCHLORIDE 2 MG/ML
INJECTION, SOLUTION EPIDURAL; INFILTRATION; PERINEURAL
Status: COMPLETED
Start: 2024-01-17 | End: 2024-01-17

## 2024-01-17 RX ORDER — SODIUM CHLORIDE, SODIUM LACTATE, POTASSIUM CHLORIDE, CALCIUM CHLORIDE 600; 310; 30; 20 MG/100ML; MG/100ML; MG/100ML; MG/100ML
INJECTION, SOLUTION INTRAVENOUS ONCE
Status: CANCELLED | OUTPATIENT
Start: 2024-01-17 | End: 2024-01-17

## 2024-01-17 RX ORDER — MISOPROSTOL 200 UG/1
800 TABLET ORAL
Status: DISCONTINUED | OUTPATIENT
Start: 2024-01-17 | End: 2024-01-18 | Stop reason: HOSPADM

## 2024-01-17 RX ORDER — ACETAMINOPHEN 500 MG
1000 TABLET ORAL
Status: DISCONTINUED | OUTPATIENT
Start: 2024-01-17 | End: 2024-01-18 | Stop reason: HOSPADM

## 2024-01-17 RX ORDER — SODIUM CHLORIDE, SODIUM LACTATE, POTASSIUM CHLORIDE, AND CALCIUM CHLORIDE .6; .31; .03; .02 G/100ML; G/100ML; G/100ML; G/100ML
250 INJECTION, SOLUTION INTRAVENOUS PRN
Status: DISCONTINUED | OUTPATIENT
Start: 2024-01-17 | End: 2024-01-18 | Stop reason: HOSPADM

## 2024-01-17 RX ORDER — SODIUM CHLORIDE, SODIUM LACTATE, POTASSIUM CHLORIDE, CALCIUM CHLORIDE 600; 310; 30; 20 MG/100ML; MG/100ML; MG/100ML; MG/100ML
INJECTION, SOLUTION INTRAVENOUS CONTINUOUS
Status: DISCONTINUED | OUTPATIENT
Start: 2024-01-17 | End: 2024-01-17

## 2024-01-17 RX ORDER — LIDOCAINE HYDROCHLORIDE AND EPINEPHRINE 15; 5 MG/ML; UG/ML
INJECTION, SOLUTION EPIDURAL PRN
Status: DISCONTINUED | OUTPATIENT
Start: 2024-01-17 | End: 2024-01-18 | Stop reason: SURG

## 2024-01-17 RX ORDER — ONDANSETRON 4 MG/1
4 TABLET, ORALLY DISINTEGRATING ORAL EVERY 6 HOURS PRN
Status: DISCONTINUED | OUTPATIENT
Start: 2024-01-17 | End: 2024-01-18 | Stop reason: HOSPADM

## 2024-01-17 RX ORDER — SODIUM CHLORIDE, SODIUM GLUCONATE, SODIUM ACETATE, POTASSIUM CHLORIDE AND MAGNESIUM CHLORIDE 526; 502; 368; 37; 30 MG/100ML; MG/100ML; MG/100ML; MG/100ML; MG/100ML
1500 INJECTION, SOLUTION INTRAVENOUS ONCE
Status: DISCONTINUED | OUTPATIENT
Start: 2024-01-17 | End: 2024-01-17

## 2024-01-17 RX ORDER — ROPIVACAINE HYDROCHLORIDE 2 MG/ML
INJECTION, SOLUTION EPIDURAL; INFILTRATION PRN
Status: DISCONTINUED | OUTPATIENT
Start: 2024-01-17 | End: 2024-01-18 | Stop reason: SURG

## 2024-01-17 RX ORDER — METHYLERGONOVINE MALEATE 0.2 MG/ML
0.2 INJECTION INTRAVENOUS
Status: DISCONTINUED | OUTPATIENT
Start: 2024-01-17 | End: 2024-01-18 | Stop reason: HOSPADM

## 2024-01-17 RX ORDER — ONDANSETRON 2 MG/ML
4 INJECTION INTRAMUSCULAR; INTRAVENOUS EVERY 6 HOURS PRN
Status: DISCONTINUED | OUTPATIENT
Start: 2024-01-17 | End: 2024-01-18 | Stop reason: HOSPADM

## 2024-01-17 RX ADMIN — OXYTOCIN 2 MILLI-UNITS/MIN: 10 INJECTION, SOLUTION INTRAMUSCULAR; INTRAVENOUS at 13:44

## 2024-01-17 RX ADMIN — ROPIVACAINE HYDROCHLORIDE: 2 INJECTION, SOLUTION EPIDURAL; INFILTRATION at 18:57

## 2024-01-17 RX ADMIN — ROPIVACAINE HYDROCHLORIDE 10 ML: 5 INJECTION, SOLUTION EPIDURAL; INFILTRATION; PERINEURAL at 18:26

## 2024-01-17 RX ADMIN — LIDOCAINE HYDROCHLORIDE,EPINEPHRINE BITARTRATE 5 ML: 15; .005 INJECTION, SOLUTION EPIDURAL; INFILTRATION; INTRACAUDAL; PERINEURAL at 18:21

## 2024-01-17 RX ADMIN — SODIUM CHLORIDE, POTASSIUM CHLORIDE, SODIUM LACTATE AND CALCIUM CHLORIDE: 600; 310; 30; 20 INJECTION, SOLUTION INTRAVENOUS at 20:35

## 2024-01-17 RX ADMIN — SODIUM CHLORIDE, POTASSIUM CHLORIDE, SODIUM LACTATE AND CALCIUM CHLORIDE: 600; 310; 30; 20 INJECTION, SOLUTION INTRAVENOUS at 13:43

## 2024-01-17 ASSESSMENT — PAIN DESCRIPTION - PAIN TYPE
TYPE: ACUTE PAIN

## 2024-01-17 ASSESSMENT — PATIENT HEALTH QUESTIONNAIRE - PHQ9
SUM OF ALL RESPONSES TO PHQ9 QUESTIONS 1 AND 2: 0
2. FEELING DOWN, DEPRESSED, IRRITABLE, OR HOPELESS: NOT AT ALL
1. LITTLE INTEREST OR PLEASURE IN DOING THINGS: NOT AT ALL

## 2024-01-17 ASSESSMENT — LIFESTYLE VARIABLES
HOW MANY TIMES IN THE PAST YEAR HAVE YOU HAD 5 OR MORE DRINKS IN A DAY: 0
TOTAL SCORE: 0
ON A TYPICAL DAY WHEN YOU DRINK ALCOHOL HOW MANY DRINKS DO YOU HAVE: 0
TOTAL SCORE: 0
CONSUMPTION TOTAL: NEGATIVE
EVER HAD A DRINK FIRST THING IN THE MORNING TO STEADY YOUR NERVES TO GET RID OF A HANGOVER: NO
TOTAL SCORE: 0
HAVE PEOPLE ANNOYED YOU BY CRITICIZING YOUR DRINKING: NO
EVER_SMOKED: NEVER
EVER FELT BAD OR GUILTY ABOUT YOUR DRINKING: NO
ALCOHOL_USE: NO
HAVE YOU EVER FELT YOU SHOULD CUT DOWN ON YOUR DRINKING: NO
AVERAGE NUMBER OF DAYS PER WEEK YOU HAVE A DRINK CONTAINING ALCOHOL: 0

## 2024-01-17 ASSESSMENT — FIBROSIS 4 INDEX: FIB4 SCORE: 0.99

## 2024-01-17 NOTE — PROGRESS NOTES
1200- pt here for scheduled c/s for transverse lie.   1225- Dr Giordano in room, bedside ultrasound done- vertex presentation. SVE by MD at this time. Orders received for IOL, pt agrees to POC.  1812- Dr Bennett in room to place epidural  1845- pt tolerated procedure well, negative test dose.  1900- report to Cira MCCLELLAND

## 2024-01-17 NOTE — CARE PLAN
The patient is Stable - Low risk of patient condition declining or worsening         Progress made toward(s) clinical / shift goals:    Problem: Knowledge Deficit - L&D  Goal: Patient and family/caregivers will demonstrate understanding of plan of care, disease process/condition, diagnostic tests and medications  Outcome: Progressing   Pt educated on POC, questions answered.    Problem: Pain  Goal: Patient's pain will be alleviated or reduced to the patient’s comfort goal  Outcome: Progressing   Pain management options discussed, pt would like an epidural when indicated.

## 2024-01-17 NOTE — H&P
Labor and Delivery History and Physical    Patient: Mayra Rojas   2001 MRN [id number]    HPI  Mayra Rojas is a 28 y.o.  at [id number] presenting for scheduled C/S due to transverse lie of fetus. She states that she is feeling well but nauseous due to fasting since midnight. Patient reports fetal movement. She denies vaginal bleeding, LOF, uterine contractions, fever, and chills at this time. Patient expresses desire for epidural for pain control.     [facility] care: [facility] starting at 21w6d  POBHx:   OB History    Para Term  AB Living   8 3 3 0 4 3   SAB IAB Ectopic Molar Multiple Live Births   3 0 0 0 0 3   PGynHx:  Past sexual partner with diagnosis of HSV this year- patient has not been with this partner in years.   Last pap  WNL, no sign of infection  Trichomonas infection treated with metronidazole on 2023  PMH  No past medical history per patient   PSH: None  Hospitalizations: None  Meds: Prenatal vitamins since w6d  Allergies: NKDA  Shx: Alcohol-denies; smoking-denies; drugs-denies  FH: Maternal grandmother with DM    Prenatal labs:   HgbA1c: 4.8  CBC:   WBC 7.4   RBC: 4.5   Hemoglobin: 12.9   Hematocrit: 38.4   Plt: 250  Blood type: AB pos, antibody screen-Neg  1 hour glucose: 119  UDS: Negative  NIPT: Declined  CF: Declined  AFP: Too late  GBS negative at 36w2d  HIV, syphilis, Hep B- Non reactive  Gonorrhea and Chlamydia Negative  Urinalysis (): Moderate leukocyte esterase, 5-10 WBC, Moderate bacteria  Urinalysis (): Trace Leukocyte esterase  Rubella: immune      PE  Maternal  Vitals:   Vitals:    24 1230   BP: 114/72   Pulse: 88   General: Pleasant, in good spirits and not in distress  HEENT: nontraumatic, no masses seen  CV: RRR w/o Murmur rubs or gallops   Trace edema on right lower extremity  Pulm: Clear to auscultation bilaterally   MSK: moves all extremities equally  Skin: distended superficial veins bilaterally on lower  extremities,   Abdomen: Gravid, nontender, no guarding, no rebound  Neuro: Alert and orientated X4  Psych: Aware of situation and content with change of plans, non anxious, responsive   /GYN:  Cervical exam: 2-3/thick @ 1300  No lesions observed    Fetal  NST:130 bpm; moderate variability, 15x15 accelerations;  category 1 tracing  Patient was evaluated by Dr. Giordano showing vertex fetal lie on US    Assessment:   Patient is a 28 yr old  at 39wk0d by 8 week US will be admitted for IOL.    #SIUP @ 39w0d by 8 wk US   -Prenatal care with [facility]   - Prenatal vitamins started at 21w6d   - Anticipate vaginal delivery  - Posterior Placenta     #Fetal status  - Category I heart tracing   -Continuous fetal monitoring  - EFW 1/10: 76.8%  - 2 Vessel umbilical cord- Noted on US on     #IOL  - Pitocin titrated for adequate contractions  - 2-3/thick  @ 1300  - Patient desires Epidural   - Okay to ambulate  - PO liquids okay  - No solid food  - DVT prophylaxis: None    #Unstable lie of fetus  - Patient was scheduled for primary C/S but found to be vertex of arrival. Patient aware of need for C/S if position changes from vertex lie     #Obesity in third trimester  - BMI: 56.91    #Blood Type  - AB POS    #GBS Status  - NEGATIVE    #Infectious labs  -HIV NR, Hep B NR, Syphilis NR, Gonorrhea NEG, Chlamydia NEG    #Late prenatal care  - Began at 21w6d    #Social concerns  - Father of the baby not involved and in senior care  - Will consider Social work consult     #Past partner diagnosed with HSV  - Pt denies outbreaks and has not been sexually active with partner in years    #Trichomonas Infection- Treated   -Treated with Flagyl     I saw and examined the patient and discussed the management with the student. I reviewed the student's note and agree with the documented findings and plan of care.    Additional attending comments:  IOL for unstable lie. Will start induction with pitocin. AROM when  indicated.

## 2024-01-18 PROCEDURE — 0UQMXZZ REPAIR VULVA, EXTERNAL APPROACH: ICD-10-PCS

## 2024-01-18 PROCEDURE — 770002 HCHG ROOM/CARE - OB PRIVATE (112)

## 2024-01-18 PROCEDURE — 700111 HCHG RX REV CODE 636 W/ 250 OVERRIDE (IP): Performed by: ANESTHESIOLOGY

## 2024-01-18 PROCEDURE — 700102 HCHG RX REV CODE 250 W/ 637 OVERRIDE(OP): Performed by: OBSTETRICS & GYNECOLOGY

## 2024-01-18 PROCEDURE — 304965 HCHG RECOVERY SERVICES

## 2024-01-18 PROCEDURE — 59400 OBSTETRICAL CARE: CPT | Performed by: OBSTETRICS & GYNECOLOGY

## 2024-01-18 PROCEDURE — 700111 HCHG RX REV CODE 636 W/ 250 OVERRIDE (IP): Performed by: OBSTETRICS & GYNECOLOGY

## 2024-01-18 PROCEDURE — 700111 HCHG RX REV CODE 636 W/ 250 OVERRIDE (IP): Mod: JZ | Performed by: ANESTHESIOLOGY

## 2024-01-18 PROCEDURE — 700105 HCHG RX REV CODE 258: Performed by: OBSTETRICS & GYNECOLOGY

## 2024-01-18 PROCEDURE — A9270 NON-COVERED ITEM OR SERVICE: HCPCS | Performed by: OBSTETRICS & GYNECOLOGY

## 2024-01-18 PROCEDURE — 59409 OBSTETRICAL CARE: CPT

## 2024-01-18 RX ORDER — ACETAMINOPHEN 500 MG
1000 TABLET ORAL EVERY 6 HOURS PRN
Status: DISCONTINUED | OUTPATIENT
Start: 2024-01-18 | End: 2024-01-19 | Stop reason: HOSPADM

## 2024-01-18 RX ORDER — SODIUM CHLORIDE, SODIUM LACTATE, POTASSIUM CHLORIDE, CALCIUM CHLORIDE 600; 310; 30; 20 MG/100ML; MG/100ML; MG/100ML; MG/100ML
INJECTION, SOLUTION INTRAVENOUS PRN
Status: DISCONTINUED | OUTPATIENT
Start: 2024-01-18 | End: 2024-01-19 | Stop reason: HOSPADM

## 2024-01-18 RX ORDER — DOCUSATE SODIUM 100 MG/1
100 CAPSULE, LIQUID FILLED ORAL 2 TIMES DAILY PRN
Status: DISCONTINUED | OUTPATIENT
Start: 2024-01-18 | End: 2024-01-19 | Stop reason: HOSPADM

## 2024-01-18 RX ORDER — IBUPROFEN 800 MG/1
800 TABLET ORAL EVERY 8 HOURS PRN
Status: DISCONTINUED | OUTPATIENT
Start: 2024-01-18 | End: 2024-01-19 | Stop reason: HOSPADM

## 2024-01-18 RX ORDER — BUPIVACAINE HYDROCHLORIDE 2.5 MG/ML
INJECTION, SOLUTION EPIDURAL; INFILTRATION; INTRACAUDAL
Status: COMPLETED
Start: 2024-01-18 | End: 2024-01-18

## 2024-01-18 RX ORDER — CALCIUM CARBONATE 500 MG/1
1000 TABLET, CHEWABLE ORAL EVERY 6 HOURS PRN
Status: DISCONTINUED | OUTPATIENT
Start: 2024-01-18 | End: 2024-01-19 | Stop reason: HOSPADM

## 2024-01-18 RX ORDER — BUPIVACAINE HYDROCHLORIDE 2.5 MG/ML
INJECTION, SOLUTION EPIDURAL; INFILTRATION; INTRACAUDAL PRN
Status: DISCONTINUED | OUTPATIENT
Start: 2024-01-18 | End: 2024-01-18 | Stop reason: SURG

## 2024-01-18 RX ORDER — SIMETHICONE 125 MG
125 TABLET,CHEWABLE ORAL 4 TIMES DAILY PRN
Status: DISCONTINUED | OUTPATIENT
Start: 2024-01-18 | End: 2024-01-19 | Stop reason: HOSPADM

## 2024-01-18 RX ADMIN — BUPIVACAINE HYDROCHLORIDE 10 ML: 2.5 INJECTION, SOLUTION EPIDURAL; INFILTRATION; INTRACAUDAL at 09:02

## 2024-01-18 RX ADMIN — OXYTOCIN 125 ML/HR: 10 INJECTION, SOLUTION INTRAMUSCULAR; INTRAVENOUS at 14:39

## 2024-01-18 RX ADMIN — OXYTOCIN 125 ML/HR: 10 INJECTION, SOLUTION INTRAMUSCULAR; INTRAVENOUS at 16:01

## 2024-01-18 RX ADMIN — ROPIVACAINE HYDROCHLORIDE: 2 INJECTION, SOLUTION EPIDURAL; INFILTRATION at 08:40

## 2024-01-18 RX ADMIN — ONDANSETRON 4 MG: 2 INJECTION INTRAMUSCULAR; INTRAVENOUS at 07:24

## 2024-01-18 RX ADMIN — ROPIVACAINE HYDROCHLORIDE: 2 INJECTION, SOLUTION EPIDURAL; INFILTRATION at 02:37

## 2024-01-18 RX ADMIN — OXYTOCIN 20 UNITS: 10 INJECTION, SOLUTION INTRAMUSCULAR; INTRAVENOUS at 13:33

## 2024-01-18 RX ADMIN — SODIUM CHLORIDE, POTASSIUM CHLORIDE, SODIUM LACTATE AND CALCIUM CHLORIDE: 600; 310; 30; 20 INJECTION, SOLUTION INTRAVENOUS at 04:46

## 2024-01-18 RX ADMIN — IBUPROFEN 800 MG: 800 TABLET, FILM COATED ORAL at 14:35

## 2024-01-18 RX ADMIN — OXYTOCIN 14 MILLI-UNITS/MIN: 10 INJECTION, SOLUTION INTRAMUSCULAR; INTRAVENOUS at 03:03

## 2024-01-18 ASSESSMENT — PAIN DESCRIPTION - PAIN TYPE
TYPE: ACUTE PAIN

## 2024-01-18 ASSESSMENT — PAIN SCALES - GENERAL: PAIN_LEVEL: 4

## 2024-01-18 NOTE — ANESTHESIA PREPROCEDURE EVALUATION
Date: 01/17/24  Procedure: Labor Epidural         Relevant Problems   CARDIAC   (positive) Two vessel umbilical cord   BMI 57  Pregnancy, labor pain    Physical Exam    Airway   Mallampati: II  TM distance: >3 FB  Neck ROM: full       Cardiovascular - normal exam  Rhythm: regular  Rate: normal  (-) murmur     Dental - normal exam           Pulmonary - normal exam  Breath sounds clear to auscultation     Abdominal    Neurological - normal exam                 Anesthesia Plan    ASA 3   ASA physical status 3 criteria: morbid obesity - BMI greater than or equal to 40    Plan - epidural   Neuraxial block will be labor analgesia                  Pertinent diagnostic labs and testing reviewed    Informed Consent:    Anesthetic plan and risks discussed with patient.

## 2024-01-18 NOTE — ANESTHESIA PROCEDURE NOTES
Epidural Block    Date/Time: 1/17/2024 6:16 PM    Performed by: Braeden Bennett M.D.  Authorized by: Braeden Bennett M.D.    Patient Location:  OB  Start Time:  1/17/2024 6:16 PM  End Time:  1/17/2024 6:21 PM  Reason for Block: labor analgesia    patient identified, IV checked, site marked, risks and benefits discussed, surgical consent, monitors and equipment checked and pre-op evaluation    Patient Position:  Sitting  Prep: ChloraPrep, patient draped and sterile technique    Monitoring:  Blood pressure, continuous pulse oximetry and heart rate  Approach:  Midline  Location:  L3-L4  Injection Technique:  ELLE saline  Skin infiltration:  Lidocaine  Strength:  1%  Dose:  3ml  Needle Type:  Tuohy  Needle Gauge:  17 G  Needle Length:  3.5 in  Loss of resistance::  8  Catheter Size:  19 G  Catheter at Skin Depth:  15  Test Dose Result:  Negative

## 2024-01-18 NOTE — ANESTHESIA TIME REPORT
Anesthesia Start and Stop Event Times       Date Time Event    1/17/2024 1803 Ready for Procedure     1816 Anesthesia Start    1/18/2024 1256 Anesthesia Stop          Responsible Staff  01/17/24 to 01/18/24      Name Role Begin End    Braeden Bennett M.D. Anesth 1816 0700    Horace Melton M.D. Anesth 0700 1256          Overtime Reason:  no overtime (within assigned shift)    Comments:

## 2024-01-18 NOTE — PROGRESS NOTES
S: Pt is feeling more pressure.      O:    Vitals:    01/18/24 0302 01/18/24 0322 01/18/24 0343 01/18/24 0401   BP: 118/84 117/77 (!) 86/53 92/54   Pulse: 81 68 63 76   Resp: 14      Temp: 36.1 °C (97 °F)   36.3 °C (97.4 °F)   TempSrc:    Temporal   SpO2:       Weight:       Height:               FHTs:  Baseline 130, + accels, - decels, moderate variability        [place]: Contractions q 2 minutes, firm to palpation, pitocin @ 14 milliunits        SVE: 8/100/-2     A/P:  1.  IUP @ 39w1d            2.  Cat 1 FHTs             3.  Anticipate vaginal delivery             4.  Will reassess as indicated    Mayra Virk CNM, APRN

## 2024-01-18 NOTE — PROGRESS NOTES
0700: Received report from KRYSTIN Howell and KRYSTIN Denis. VSS, pt lying on left side. Complaints of pressure but no urge to push.     1256:  of viable male infant. APGAR 8, 9. Pt vss. Infant with mother. Light rubra lochia noted. No complications or complaints at this time.    1600: Report given to KRYSTIN Rose. Pt vss, PP pitocin restarted, fundus firm at u with scant bleeding and slight edema. Care relinquished.

## 2024-01-18 NOTE — CARE PLAN
The patient is Stable - Low risk of patient condition declining or worsening    Shift Goals  Clinical Goals:   Patient Goals: healthy mom, healthy baby  Family Goals: support    Progress made toward(s) clinical / shift goals:    Problem: Risk for Infection and Impaired Wound Healing  Goal: Patient will remain free from infection  Outcome: Met     Problem: Pain  Goal: Patient's pain will be alleviated or reduced to the patient’s comfort goal  Outcome: Met

## 2024-01-18 NOTE — CARE PLAN
The patient is Stable - Low risk of patient condition declining or worsening    Shift Goals  Clinical Goals: cervical dilation  Patient Goals: healthy baby  Family Goals: healthy mom and baby    Progress made toward(s) clinical / shift goals:  cervical dilation and delivery    Patient is not progressing towards the following goals:

## 2024-01-18 NOTE — PROGRESS NOTES
1900: Report received from Sanam. Reviewed plan of care with patient and support person, patient agreed to continue with plan of care. All questions answered at this time.  2010: Mayra C CNM at bedside. SVE performed by provider, hinds balloon inserted with patient consent.  2251: Hinds balloon out, Mayra C CNM notified.  2259: Mayra C CNM at bedside. AROM performed with patient consent.  2304: IUPC and FSE inserted by Provider at this time.   1900: Report given to Preeti MCCLELLAND.

## 2024-01-18 NOTE — PROGRESS NOTES
S: Pt remains comfortable with epidural. Feels pressure..      O:    Vitals:    01/18/24 0442 01/18/24 0503 01/18/24 0522 01/18/24 0543   BP: 102/59 94/51 104/57 121/64   Pulse: 81 76 82 79   Resp:       Temp:       TempSrc:       SpO2:       Weight:       Height:               FHTs:  Baseline 130, + accels, early decels, moderate variability        [place]: Contractions q 2 minutes, firm to palpation, pitocin @ 14 milliunits        SVE: 8-9/100/-2     A/P:  1.  IUP @ 39w1d            2.  Cat 1 FHTs             3.  Continue to position and turn             4.  Will reassess as indicated    Mayra Virk CNM, APRN

## 2024-01-18 NOTE — PROGRESS NOTES
S: In to introduce self to pt and family. Pt is comfortable with epidural. Agreeable to exam and possible ROM.      O:    Vitals:    01/17/24 1850 01/17/24 1855 01/17/24 1900 01/17/24 1922   BP: 127/67 103/52 101/52 112/56   Pulse: 93 74 70 75   Temp:    36.2 °C (97.1 °F)   TempSrc:       SpO2:       Weight:       Height:               FHTs:  Baseline 125, pos accels, neg decels, moderate variability        [place]: Contractions q 3-4 minutes, mod to palpation, pitocin @ 12 milliunits        SVE: 2-3/th/-3 cephalic presentation     A/P:  1.  IUP @ 39w0d            2.  Cat 1 FHTs             3.  S/P hinds catheter 30 ml             4.  Will reassess in 3 hours or as indicated    aMyra Virk CNM, APRN

## 2024-01-18 NOTE — PROGRESS NOTES
S: Pt is comfortable with epidural. Fallon has fallen out. Pt agreeable to exam and possible AROM and internal moniters.      O:    Vitals:    01/17/24 2142 01/17/24 2200 01/17/24 2221 01/17/24 2242   BP: 111/52 114/57 108/57 110/59   Pulse: 70 71 72 67   Temp:       TempSrc:       SpO2:       Weight:       Height:               FHTs:  Baseline 130, negative accels, early decels, moderate variability        [place]: Contractions q 2 minutes, mod to palpation, pitocin @ 14 milliunits        SVE: 4/70/-2     A/P:  1.  IUP @ 39w0d            2.  Cat 1 FHTs             3.  S/P AROM clear             4.  IUPC and FSE applied           5.  Plan to reassess in 3 hours or as indicated.    Mayra Virk, HUNTER, APRN

## 2024-01-18 NOTE — L&D DELIVERY NOTE
Vaginal Delivery Procedure Note:    Ethanfiluis Persaud is a 28 y.o. , admitted for LTCS for breech.  Her labor course was notable for baby presenting vertex, prompting IOL.    PreDelivery Diagnosis:  1. SIUP @ 39w1d  2. IOL with pitocin    PostDelivery Diagnosis:  1. S/p     Procedure in Detail:  Pt pushing dorsal lithotomy position. Baby initially presenting OP but turned OA prior to delivery. Head delivered easily and restituted towards maternal left.  Anterior shoulder followed easily with gentle downwards pressure followed by the posterior shoulder and body. Male infant delivered @ 1256.  There was no nuchal cord.  Infant was placed on the maternal abdomen and was stimulated and bulb suctioned.  Cord clamping was delayed x60 seconds then the cord was clamped x2 and cut.  Infant APGARs 8 and 9 and 1 and 5 minutes, respectively.   Cord gases were not sent.  IV pitocin bolus started.  Placenta delivered spontaneously intact at 1301. 2 Vessel cord.  The vagina and perineum were examined revealing 1 R labial laceration and 1 minimal perineal laceration requiring suture repair.  The uterus was firm with IV pitocin and fundal massage.  Pt and infant left bonding in LDR.     mL  Anesthesia - Epidural  Sponge and needle counts correct.  Patient tolerated procedure well.    Anticipate routine postparum care.      Miguel Acevedo M.D.  [facility]  PGY-1

## 2024-01-19 VITALS
TEMPERATURE: 96.9 F | OXYGEN SATURATION: 95 % | HEART RATE: 95 BPM | WEIGHT: 293 LBS | RESPIRATION RATE: 17 BRPM | HEIGHT: 65 IN | SYSTOLIC BLOOD PRESSURE: 113 MMHG | DIASTOLIC BLOOD PRESSURE: 71 MMHG | BODY MASS INDEX: 48.82 KG/M2

## 2024-01-19 LAB
ERYTHROCYTE [DISTWIDTH] IN BLOOD BY AUTOMATED COUNT: 40.4 FL (ref 35.9–50)
HCT VFR BLD AUTO: 30 % (ref 37–47)
HGB BLD-MCNC: 10.4 G/DL (ref 12–16)
MCH RBC QN AUTO: 29.6 PG (ref 27–33)
MCHC RBC AUTO-ENTMCNC: 34.7 G/DL (ref 32.2–35.5)
MCV RBC AUTO: 85.5 FL (ref 81.4–97.8)
PLATELET # BLD AUTO: 176 K/UL (ref 164–446)
PMV BLD AUTO: 9.6 FL (ref 9–12.9)
RBC # BLD AUTO: 3.51 M/UL (ref 4.2–5.4)
WBC # BLD AUTO: 11.3 K/UL (ref 4.8–10.8)

## 2024-01-19 PROCEDURE — A9270 NON-COVERED ITEM OR SERVICE: HCPCS

## 2024-01-19 PROCEDURE — 85027 COMPLETE CBC AUTOMATED: CPT

## 2024-01-19 PROCEDURE — 700102 HCHG RX REV CODE 250 W/ 637 OVERRIDE(OP)

## 2024-01-19 PROCEDURE — 36415 COLL VENOUS BLD VENIPUNCTURE: CPT

## 2024-01-19 RX ORDER — IBUPROFEN 800 MG/1
800 TABLET ORAL EVERY 8 HOURS PRN
Qty: 60 TABLET | Refills: 3 | Status: SHIPPED | OUTPATIENT
Start: 2024-01-19

## 2024-01-19 RX ORDER — PSEUDOEPHEDRINE HCL 30 MG
100 TABLET ORAL 2 TIMES DAILY PRN
Qty: 60 CAPSULE | Refills: 3 | Status: SHIPPED | OUTPATIENT
Start: 2024-01-19

## 2024-01-19 RX ORDER — FERROUS SULFATE 325(65) MG
325 TABLET ORAL DAILY
Qty: 60 TABLET | Refills: 3 | Status: SHIPPED | OUTPATIENT
Start: 2024-01-19

## 2024-01-19 RX ORDER — ACETAMINOPHEN 500 MG
1000 TABLET ORAL EVERY 6 HOURS PRN
Qty: 60 TABLET | Refills: 3 | Status: SHIPPED | OUTPATIENT
Start: 2024-01-19

## 2024-01-19 RX ADMIN — ACETAMINOPHEN 1000 MG: 500 TABLET ORAL at 02:23

## 2024-01-19 RX ADMIN — ACETAMINOPHEN 1000 MG: 500 TABLET ORAL at 13:18

## 2024-01-19 ASSESSMENT — PAIN DESCRIPTION - PAIN TYPE
TYPE: ACUTE PAIN

## 2024-01-19 ASSESSMENT — EDINBURGH POSTNATAL DEPRESSION SCALE (EPDS)
I HAVE BEEN ANXIOUS OR WORRIED FOR NO GOOD REASON: HARDLY EVER
I HAVE BEEN SO UNHAPPY THAT I HAVE BEEN CRYING: ONLY OCCASIONALLY
I HAVE LOOKED FORWARD WITH ENJOYMENT TO THINGS: AS MUCH AS I EVER DID
THINGS HAVE BEEN GETTING ON TOP OF ME: NO, MOST OF THE TIME I HAVE COPED QUITE WELL
I HAVE FELT SCARED OR PANICKY FOR NO GOOD REASON: NO, NOT AT ALL
I HAVE BEEN ABLE TO LAUGH AND SEE THE FUNNY SIDE OF THINGS: AS MUCH AS I ALWAYS COULD
I HAVE BLAMED MYSELF UNNECESSARILY WHEN THINGS WENT WRONG: NOT VERY OFTEN
I HAVE FELT SAD OR MISERABLE: NOT VERY OFTEN
I HAVE BEEN SO UNHAPPY THAT I HAVE HAD DIFFICULTY SLEEPING: NOT VERY OFTEN
THE THOUGHT OF HARMING MYSELF HAS OCCURRED TO ME: NEVER

## 2024-01-19 NOTE — CARE PLAN
The patient is Stable - Low risk of patient condition declining or worsening    Shift Goals  Clinical Goals: rest, pain control, VSS  Patient Goals: pain control  Family Goals: support and rest    Progress made toward(s) clinical / shift goals:  bonding well with infant, able to care for self and infant  Problem: Pain - Standard  Goal: Alleviation of pain or a reduction in pain to the patient’s comfort goal  Outcome: Progressing  Note: Pain control at this time, medicated as needed.      Problem: Altered Physiologic Condition  Goal: Patient physiologically stable as evidenced by normal lochia, palpable uterine involution and vitals within normal limits  Outcome: Progressing  Note: Fundus firm at U, lochia rubra minimal. VSS       Patient is not progressing towards the following goals:

## 2024-01-19 NOTE — PROGRESS NOTES
0705- Report received from KRYSTIN Martinez.     0410-Assessment per flowsheet completed. Pt declined pain interventions at this time. Reviewed POC with pt; questions answered. Pt was encouraged to call with needs or concerns.
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
## 2024-01-19 NOTE — DISCHARGE INSTRUCTIONS
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
## 2024-01-19 NOTE — CARE PLAN
The patient is Stable - Low risk of patient condition declining or worsening    Shift Goals  Clinical Goals: remain clinically stable  Patient Goals: pain control  Family Goals: support and discharge home    Progress made toward(s) clinical / shift goals:  Patient is clinically stable and ready for discharge.    Patient is not progressing towards the following goals:

## 2024-01-19 NOTE — PROGRESS NOTES
Pt arrived to room 333 from L&D via wheelchair transport. Transferred to bed without difficulty, steady gait. Bedside report received from KRYSTIN Álvarez. Lochia scant and rubra, without clot expressed; fundus firm and midline. Last bag of Pitocin is infusing at 125 mL/hr. Pt oriented to room, call light, pain management options, paperwork, adán care, and pad changes. Updated on POC and safety precautions. Upper side rails up x2, bed on lowest position, call light within reach. Questions answered and they verbalized understanding.

## 2024-04-05 ENCOUNTER — TELEPHONE (OUTPATIENT)
Dept: OBGYN | Facility: CLINIC | Age: 29
End: 2024-04-05
Payer: MEDICAID

## 2024-06-17 ENCOUNTER — HOSPITAL ENCOUNTER (OUTPATIENT)
Dept: RADIOLOGY | Facility: MEDICAL CENTER | Age: 29
End: 2024-06-17
Attending: FAMILY MEDICINE
Payer: COMMERCIAL

## 2024-06-17 ENCOUNTER — OCCUPATIONAL MEDICINE (OUTPATIENT)
Dept: URGENT CARE | Facility: PHYSICIAN GROUP | Age: 29
End: 2024-06-17
Payer: COMMERCIAL

## 2024-06-17 VITALS
DIASTOLIC BLOOD PRESSURE: 70 MMHG | HEIGHT: 65 IN | BODY MASS INDEX: 48.82 KG/M2 | TEMPERATURE: 96.7 F | WEIGHT: 293 LBS | SYSTOLIC BLOOD PRESSURE: 120 MMHG | HEART RATE: 78 BPM | RESPIRATION RATE: 16 BRPM | OXYGEN SATURATION: 98 %

## 2024-06-17 DIAGNOSIS — S83.92XA SPRAIN OF LEFT KNEE, UNSPECIFIED LIGAMENT, INITIAL ENCOUNTER: ICD-10-CM

## 2024-06-17 PROCEDURE — 73562 X-RAY EXAM OF KNEE 3: CPT | Mod: LT

## 2024-06-17 PROCEDURE — 3074F SYST BP LT 130 MM HG: CPT | Performed by: FAMILY MEDICINE

## 2024-06-17 PROCEDURE — 99203 OFFICE O/P NEW LOW 30 MIN: CPT | Performed by: FAMILY MEDICINE

## 2024-06-17 PROCEDURE — 3078F DIAST BP <80 MM HG: CPT | Performed by: FAMILY MEDICINE

## 2024-06-17 ASSESSMENT — FIBROSIS 4 INDEX: FIB4 SCORE: 1.37

## 2024-06-17 ASSESSMENT — ENCOUNTER SYMPTOMS
FOCAL WEAKNESS: 0
SENSORY CHANGE: 0
TINGLING: 0
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
## 2024-06-17 NOTE — PROGRESS NOTES
Subjective:   Mayra Rojas is a 29 y.o. female who presents for Knee Injury (New work comp Right knee injury 06/16/24. Fell onto knee. )    Date of injury 6/16/2024.  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  Reports requiring assistance from customers to regain upright stance and then reported pain throughout the rest of the shift.  Reports being able to complete the shift yet with pain and pain with moving the left knee.  Reports persistent pain this morning with limitations in ambulation and movement in the left knee.   See the C4 and D39 form    Knee Injury      PMH:  has a past medical history of Allergy and Patient denies medical problems.    She has no past medical history of Addisons disease (formerly [facility]), Adrenal disorder (formerly [facility]), Anemia, Anxiety, Arrhythmia, Arthritis, Asthma, Blood transfusion without reported diagnosis, Cancer (formerly [facility]), Cataract, CHF (congestive heart failure) (formerly [facility]), Clotting disorder (formerly [facility]), COPD (chronic obstructive pulmonary disease) (formerly [facility]), Cushings syndrome (formerly [facility]), Depression, Diabetes (formerly [facility]), Diabetic neuropathy (formerly [facility]), GERD (gastroesophageal reflux disease), Glaucoma, Goiter, Head ache, Heart attack (formerly [facility]), Heart murmur, HIV (human immunodeficiency virus infection) (formerly [facility]), Hyperlipidemia, Hypertension, IBD (inflammatory bowel disease), Kidney disease, Meningitis, Migraine, Muscle disorder, Osteoporosis, Parathyroid disorder (formerly [facility]), Pituitary disease (formerly [facility]), Pulmonary emphysema (formerly [facility]), Seizure (formerly [facility]), Sickle cell disease (formerly [facility]), Stroke (formerly [facility]), Substance abuse (formerly [facility]), Thyroid disease, Tuberculosis, or Urinary tract infection.  MEDS:   Current Outpatient Medications:     acetaminophen (TYLENOL) 500 MG Tab, Take 2 Tablets by mouth every 6 hours as needed for Moderate  "Pain, Mild Pain or Fever., Disp: 60 Tablet, Rfl: 3    ibuprofen (MOTRIN) 800 MG Tab, Take 1 Tablet by mouth every 8 hours as needed for Mild Pain, Moderate Pain, Headache or Inflammation., Disp: 60 Tablet, Rfl: 3    docusate sodium 100 MG Cap, Take 100 mg by mouth 2 times a day as needed for Constipation. (Patient not taking: Reported on 6/17/2024), Disp: 60 Capsule, Rfl: 3    ferrous sulfate 325 (65 Fe) MG tablet, Take 1 Tablet by mouth every day. (Patient not taking: Reported on 6/17/2024), Disp: 60 Tablet, Rfl: 3  ALLERGIES:   Allergies   Allergen Reactions    Cauliflower [Brassica Oleracea Italica] Anaphylaxis    Nkda [No Known Drug Allergy]      SURGHX: History reviewed. No pertinent surgical history.  SOCHX:  reports that she has never smoked. She has never used smokeless tobacco. She reports current drug use. Drugs: Inhaled and Marijuana. She reports that she does not drink alcohol.  FH:   Family History   Problem Relation Age of Onset    Hypertension Mother     Multiple Sclerosis Sister     Other Sister         Pt states both her sisters have endometriosis    No Known Problems Brother     Diabetes Maternal Grandmother     Cancer Maternal Grandmother         breast     Review of Systems   Neurological:  Negative for tingling, sensory change and focal weakness.   All other systems reviewed and are negative.       Objective:   /70 (BP Location: Left arm, Patient Position: Sitting, BP Cuff Size: Adult)   Pulse 78   Temp 35.9 °C (96.7 °F) (Temporal)   Resp 16   Ht 1.651 m (5' 5\")   Wt (!) 156 kg (343 lb 14.7 oz)   SpO2 98%   BMI 57.23 kg/m²   Physical Exam  Vitals and nursing note reviewed.   Constitutional:       General: She is not in acute distress.     Appearance: She is well-developed.   HENT:      Head: Normocephalic and atraumatic.      Right Ear: External ear normal.      Left Ear: External ear normal.      Nose: Nose normal.      Mouth/Throat:      Mouth: Mucous membranes are moist.   Eyes: "      Conjunctiva/sclera: Conjunctivae normal.   Cardiovascular:      Rate and Rhythm: Normal rate.   Pulmonary:      Effort: Pulmonary effort is normal. No respiratory distress.      Breath sounds: Normal breath sounds.   Abdominal:      General: There is no distension.   Musculoskeletal:      Left knee: Swelling present. No deformity or effusion. Decreased range of motion.   Skin:     General: Skin is warm and dry.   Neurological:      General: No focal deficit present.      Mental Status: She is alert and oriented to person, place, and time. Mental status is at baseline.      Gait: Gait (antalgic left) normal.   Psychiatric:         Judgment: Judgment normal.       Left knee: Markedly tender to palpation diffusely, tender to palpation anterior aspect left patella, diffuse edema, no effusion, no ecchymosis, no bony point tenderness, range of motion limited from guarding at 10 degrees extension 30 degrees flexion, antalgic gait                DX-KNEE 3 VIEWS LEFT  Order: [id number]   Status: Final result       Visible to patient: Yes (not seen)       Dx: Sprain of left knee, unspecified liga...    0 Result Notes  Details    Reading Physician Reading Date Result Priority   Keron Delarosa M.D.  [phone] 6/17/2024      Narrative & Impression     6/17/2024 2:47 PM     HISTORY/REASON FOR EXAM:  Pain/Deformity Following Trauma.  LEFT knee pain, ground-level fall.     TECHNIQUE/EXAM DESCRIPTION AND NUMBER OF VIEWS:  3 views of the LEFT knee.     COMPARISON: None     FINDINGS:  No gross soft tissue swelling or joint effusion.  Joint spaces are preserved.  Minimal osteophyte formation of the articular margins.  No fracture or dislocation.     IMPRESSION:     1.  No fracture or dislocation of LEFT knee.  2.  Minimal degenerative changes.           Exam Ended: 06/17/24  3:11 PM Last Resulted: 06/17/24  3:18 PM          Assessment/Plan:   1. Sprain of left knee, unspecified ligament, initial encounter  - Knee Brace  -  CRUTCHES    See the C4 and D39 form.  Recommend crutches, recommend knee brace, range of motion as tolerated, recommend ice and heat application as needed, over-the-counter ibuprofen and/or acetaminophen as needed, recommend return to clinic in 3 days for recheck reevaluation consideration of further management      Medical Decision Making/Course:  In the course of preparing for this visit with review of the pertinent past medical history, recent and past clinic visits, current medications, and performing chart, immunization, medical history and medication reconciliation, and in the further course of obtaining the current history pertinent to the clinic visit today, performing an exam and evaluation, ordering and independently evaluating labs, tests including independent interpretation evaluation of x-ray imaging, and/or procedures including application of knee brace and crutches during urgent care course, prescribing any recommended new medications as noted above, providing any pertinent counseling and education and recommending further coordination of care including recommendations for work activity, at least  44 minutes of total time were spent during this encounter.        Please note that this dictation was created using voice recognition software. I have worked with consultants from the vendor as well as technical experts from [facility] to optimize the interface. I have made every reasonable attempt to correct obvious errors, but I expect that there are errors of grammar and possibly content that I did not discover before finalizing the note.

## 2024-06-17 NOTE — LETTER
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   Related to the Industrial Injury? Yes   Explain:[unfilled]   Objective Medical Findings: Left knee: Markedly tender to palpation diffusely, tender to palpation anterior aspect left patella, diffuse edema, no effusion, no ecchymosis, no bony point tenderness, range of motion limited from guarding at 10 degrees extension 30 degrees flexion, antalgic gait    []  None - Discharged                         Stable     []  Yes     []  No                           Ratable     []  Yes     []  No   []  Generally Improved                        []  Condition Worsened                              []  Condition Same         May Have Suffered a Permanent Disability     []  Yes     []  No   Treatment Plan: [unfilled]     [] No Change in Therapy                    [] PT/OT Prescribed                   [] Medication May be Used While Working    [] Case Management                          [] PT/OT Discontinued  []  Consultation    [] Further Diagnostic Studies    []  Prescription(s)                        [] Released to FULL DUTY /No Restrictions on (Date):                                                                                           [] Certified TOTALLY TEMPORARILY DISABLED (Indicate Dates): From:                       To:                               [] Released to  RESTRICTED/Modified Duty on (Date): From:                              To:                                                                   Restrictions Are:     []  Permanent[]  Temporary   [] No Sitting                   []  No Standing          []  No Pulling             []  Other:                                              [] No Bending at Waist  []  No Stooping          []  No Lifting                                                                            [] No Carrying                []  No Walking           []  Lifting Restricted to (lbs.):   [] No Pushing                 []  No Climbing         []  No Reaching Above Shoulders   Date of Next Visit: 6/20/2024 Date of this Exam:  6/17/2024 Physician/Chiropractic Physician Name: Felipe Dale M.D. Physician/Chiropractic Physician Signature:   Tomy Ng DO MPH   D-39 (Rev. 2/24)

## 2024-06-20 ENCOUNTER — OCCUPATIONAL MEDICINE (OUTPATIENT)
Dept: URGENT CARE | Facility: PHYSICIAN GROUP | Age: 29
End: 2024-06-20
Payer: COMMERCIAL

## 2024-06-20 VITALS
WEIGHT: 293 LBS | BODY MASS INDEX: 48.82 KG/M2 | TEMPERATURE: 96.8 F | SYSTOLIC BLOOD PRESSURE: 116 MMHG | DIASTOLIC BLOOD PRESSURE: 76 MMHG | OXYGEN SATURATION: 98 % | HEART RATE: 105 BPM | RESPIRATION RATE: 16 BRPM | HEIGHT: 65 IN

## 2024-06-20 DIAGNOSIS — S83.92XA SPRAIN OF LEFT KNEE, UNSPECIFIED LIGAMENT, INITIAL ENCOUNTER: ICD-10-CM

## 2024-06-20 PROCEDURE — 3074F SYST BP LT 130 MM HG: CPT | Performed by: FAMILY MEDICINE

## 2024-06-20 PROCEDURE — 99213 OFFICE O/P EST LOW 20 MIN: CPT | Performed by: FAMILY MEDICINE

## 2024-06-20 PROCEDURE — 3078F DIAST BP <80 MM HG: CPT | Performed by: FAMILY MEDICINE

## 2024-06-20 ASSESSMENT — ENCOUNTER SYMPTOMS
SENSORY CHANGE: 0
FEVER: 0
TINGLING: 0

## 2024-06-20 ASSESSMENT — FIBROSIS 4 INDEX: FIB4 SCORE: 1.37

## 2024-06-20 NOTE — PROGRESS NOTES
"Subjective:     Mayra Rojas is a 29 y.o. female who presents for Follow-Up ( [facility] DOI 06.16.24  (L) knee; still has pain )    HPI  Pt presents for follow-up of left knee pain  DOI: 6/16/2024  [place]: Walking in the freezer and tripped on an elevated tile of the floor causing twisting of the left knee and direct impact onto the knee  Patient seen in urgent care the day after injury and had x-ray which did not show fracture or dislocation  Patient given a knee brace and crutches  Pt with continued knee pain since last visit   Pain is more along the lateral knee   Pain is worse with walking or any weight bearing   Has some bruising in the knee     Review of Systems   Constitutional:  Negative for fever.   Skin:  Negative for rash.   Neurological:  Negative for tingling and sensory change.     PMH: Past medical history reviewed in Epic  MEDS: Medications were reviewed in Epic  ALLERGIES: Allergies were reviewed in Epic     Objective:   /76 (BP Location: Left arm, Patient Position: Sitting, BP Cuff Size: Large adult long)   Pulse (!) 105   Temp 36 °C (96.8 °F) (Temporal)   Resp 16   Ht 1.651 m (5' 5\")   Wt (!) 156 kg (343 lb)   SpO2 98%   BMI 57.08 kg/m²     Physical Exam  Constitutional:       General: She is not in acute distress.     Appearance: She is well-developed. She is not diaphoretic.   Pulmonary:      Effort: Pulmonary effort is normal.   Neurological:      Mental Status: She is alert.     Left knee  Appearance - No bruising, erythema, or deformity appreciated  Palpation - +TTP along the pes anserine, medial knee, and lateral upper calf  ROM - FROM extension with difficulties flexing past 90  Neuro - Sensation intact  Special testing - Exam limited by pain.  No laxity or pain with varus/valgus stress, neg patellar apprehension test    Assessment/Plan:   Assessment    1. Sprain of left knee, unspecified ligament, initial encounter  - Referral to Occupational Medicine    Patient with " left knee sprain.  Still needing to rely on crutches.  At this point, recommended that patient continue crutches and try to wean out of them over the next week.  Continue to wear knee brace with activity.  New D39 given with restriction of seated work only.  Reviewed other supportive care measures and encouraged her to start working on some light home exercises.  Will likely take a few weeks to fully resolve and did recommend follow-up with occupational medicine.

## 2024-06-28 ENCOUNTER — OCCUPATIONAL MEDICINE (OUTPATIENT)
Dept: URGENT CARE | Facility: PHYSICIAN GROUP | Age: 29
End: 2024-06-28
Payer: COMMERCIAL

## 2024-06-28 VITALS
SYSTOLIC BLOOD PRESSURE: 128 MMHG | DIASTOLIC BLOOD PRESSURE: 86 MMHG | WEIGHT: 293 LBS | RESPIRATION RATE: 18 BRPM | OXYGEN SATURATION: 98 % | BODY MASS INDEX: 48.82 KG/M2 | HEIGHT: 65 IN | TEMPERATURE: 98.3 F | HEART RATE: 90 BPM

## 2024-06-28 DIAGNOSIS — S86.912D KNEE STRAIN, LEFT, SUBSEQUENT ENCOUNTER: ICD-10-CM

## 2024-06-28 ASSESSMENT — FIBROSIS 4 INDEX: FIB4 SCORE: 1.37

## 2024-06-28 NOTE — PROGRESS NOTES
"Subjective:   Mayra Rojas is a 29 y.o. female who presents for Follow-Up (DOI 06.16.24/ L knee ,unable to see [facility] health until 7/11/24 needs new work note for restrictions. In pain )      HPI    ROS    Medications, Allergies, and current problem list reviewed today in Epic.     Objective:     /86   Pulse 90   Temp 36.8 °C (98.3 °F) (Temporal)   Resp 18   Ht 1.651 m (5' 5\")   Wt (!) 156 kg (343 lb)   SpO2 98%     Physical Exam    Assessment/Plan:     Diagnosis and associated orders:     1. Knee strain, left, subsequent encounter           Comments/MDM:     See form         Differential diagnosis, natural history, supportive care, and indications for immediate follow-up discussed.    Advised the patient to follow-up with the primary care physician for recheck, reevaluation, and consideration of further management.    Please note that this dictation was created using voice recognition software. I have made a reasonable attempt to correct obvious errors, but I expect that there are errors of grammar and possibly content that I did not discover before finalizing the note.    This note was electronically signed by Gopal Welch M.D.  "

## 2024-07-11 ENCOUNTER — APPOINTMENT (OUTPATIENT)
Dept: OCCUPATIONAL MEDICINE | Facility: CLINIC | Age: 29
End: 2024-07-11
Payer: COMMERCIAL

## 2024-07-11 VITALS
OXYGEN SATURATION: 96 % | BODY MASS INDEX: 48.82 KG/M2 | SYSTOLIC BLOOD PRESSURE: 124 MMHG | HEART RATE: 106 BPM | WEIGHT: 293 LBS | RESPIRATION RATE: 20 BRPM | HEIGHT: 65 IN | DIASTOLIC BLOOD PRESSURE: 78 MMHG

## 2024-07-11 DIAGNOSIS — S83.92XD SPRAIN OF LEFT KNEE, UNSPECIFIED LIGAMENT, SUBSEQUENT ENCOUNTER: ICD-10-CM

## 2024-07-11 PROCEDURE — 3074F SYST BP LT 130 MM HG: CPT | Performed by: PREVENTIVE MEDICINE

## 2024-07-11 PROCEDURE — 99214 OFFICE O/P EST MOD 30 MIN: CPT | Performed by: PREVENTIVE MEDICINE

## 2024-07-11 PROCEDURE — 3078F DIAST BP <80 MM HG: CPT | Performed by: PREVENTIVE MEDICINE

## 2024-07-11 ASSESSMENT — FIBROSIS 4 INDEX: FIB4 SCORE: 1.37

## 2024-08-08 ENCOUNTER — OCCUPATIONAL MEDICINE (OUTPATIENT)
Dept: OCCUPATIONAL MEDICINE | Facility: CLINIC | Age: 29
End: 2024-08-08
Payer: COMMERCIAL

## 2024-08-08 VITALS
RESPIRATION RATE: 18 BRPM | HEIGHT: 65 IN | BODY MASS INDEX: 48.82 KG/M2 | TEMPERATURE: 97 F | SYSTOLIC BLOOD PRESSURE: 118 MMHG | DIASTOLIC BLOOD PRESSURE: 78 MMHG | WEIGHT: 293 LBS | OXYGEN SATURATION: 94 % | HEART RATE: 99 BPM

## 2024-08-08 DIAGNOSIS — S83.92XD SPRAIN OF LEFT KNEE, UNSPECIFIED LIGAMENT, SUBSEQUENT ENCOUNTER: ICD-10-CM

## 2024-08-08 PROCEDURE — 3078F DIAST BP <80 MM HG: CPT | Performed by: PREVENTIVE MEDICINE

## 2024-08-08 PROCEDURE — 99213 OFFICE O/P EST LOW 20 MIN: CPT | Performed by: PREVENTIVE MEDICINE

## 2024-08-08 PROCEDURE — 3074F SYST BP LT 130 MM HG: CPT | Performed by: PREVENTIVE MEDICINE

## 2024-08-08 ASSESSMENT — FIBROSIS 4 INDEX: FIB4 SCORE: 1.37

## 2024-08-08 NOTE — PROGRESS NOTES
"Subjective:     Mayra Rojas is a 29 y.o. 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  [place]: Walking in the freezer and tripped on an elevated tile of the floor causing twisting of the left knee and direct impact onto the knee.  She was seen in urgent care x 2, x-rays of the left knee were negative for acute findings.     7/11/2024: Patient states overall symptoms are only little bit improved.  She states she is able to walk low but better than before.  She is not having to use crutches.  She states that pain is mostly on the lateral and posterior aspect of the knee.  She states that she gets occasional instability but not much.  She denies prior left knee injuries.  She states she has been using the knee brace for comfort but sometimes she feels that her knee swells up too much and becomes uncomfortable to use the knee brace.    8/8/2024: Patient states that overall symptoms have been gradually improving.  She states she has done 2 sessions of physical therapy.  She states she has less pain in the knee and little bit more range of motion.  She states she is able to stand/walk for about 1 to 2 hours before she gets too much pain.  She states she feels like lessening the work restrictions and trying to go back to work on modified duty.  Has not heard anything about the MRI being approved yet.    ROS    SOCHX: Works as a  at [employer]  FH: No pertinent family history to this problem.       Objective:     /78 (BP Location: Left arm, Patient Position: Sitting, BP Cuff Size: Adult)   Pulse 99   Temp 36.1 °C (97 °F) (Temporal)   Resp 18   Ht 1.651 m (5' 5\")   Wt (!) 156 kg (345 lb)   SpO2 94%   BMI 57.41 kg/m²     Constitutional: Patient is in no acute distress. Appears well-developed and well-nourished.   Cardiovascular: Normal rate.    Pulmonary/Chest: Effort normal. No " respiratory distress.   Neurological: Patient is alert and oriented to person, place, and time.   Skin: Skin is warm and dry.   Psychiatric: Normal mood and affect. Behavior is normal.     Left knee: No gross deformity.  Area of tenderness over the lateral joint line and posterior knee diffusely.  Range of motion slight diminished with knee flexion due to pain.      Assessment/Plan:     1. Sprain of left knee, unspecified ligament, subsequent encounter      MRI left knee without pending approval  Continue physical therapy  Gentle range of motion exercises, Increase ambulation as tolerated  Heat/ice as needed and OTC muscle creams/ointments as needed      Work Status: Restricted Duty - see D-39 or other state/federal worker's compensation forms for specific restrictions if applicable  Follow up 4 weeks    Differential diagnosis, natural history, supportive care, and indications for immediate follow-up discussed.    Approximately 25 minutes were spent in reviewing notes, preparing for visit, obtaining history, exam and evaluation, patient counseling/education, and post visit documentation/orders. Significant time was spent completing state/federal worker's compensation forms.

## 2024-08-08 NOTE — LETTER
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 Yes     Explain:       Objective Medical Findings:Left knee: No gross deformity.  Area of tenderness over the lateral joint line and posterior knee diffusely.  Range of motion slight diminished with knee flexion due to pain.          None - Discharged                         Stable  No                 Ratable  No     X  Generally Improved                        Condition Worsened                 Condition Same  May Have Suffered a Permanent Disability  No     Treatment Plan:    MRI left knee without pending approval  Continue physical therapy  Gentle range of motion exercises, Increase ambulation as tolerated  Heat/ice as needed and OTC muscle creams/ointments as needed          No Change in Therapy                 PT/OT Prescribed                     Medication May be Used While Working       Case Management                        PT/OT Discontinued    Consultation    Further Diagnostic Studies:                               Prescription(s)                             Released to FULL DUTY /No Restrictions on (Date):  From:      Certified TOTALLY TEMPORARILY DISABLED (Indicate Dates) From:   To:    X  Released to RESTRICTED/Modified Duty on (Date): From: 8/8/2024 To: 9/5/2024                                                               Restrictions Are:  Temporary     No Sitting                               No Standing                   No Pulling                  Other: Allow to  sit for 15 to 20 minutes every 2 hours as needed. Minimal squatting and kneeling    No Bending at Waist           No Stooping                    No Lifting       No Carrying                           No Walking                Lifting Restricted to (lbs.):  < or = to 25 pounds    No Pushing                            No Climbing                   No Reaching Above Shoulders   Date of Next Visit:  9/5/2024  @3:45 PM  Date of this Exam:8/8/2024 Physician/Chiropractic Physician Name:Tomy Ng D.O. Physician/Chiropractic Physician Signature:  Tomy Ng DO MPH   D-39 (Rev. 2/24)

## 2024-09-16 ENCOUNTER — OCCUPATIONAL MEDICINE (OUTPATIENT)
Dept: OCCUPATIONAL MEDICINE | Facility: CLINIC | Age: 29
End: 2024-09-16
Payer: COMMERCIAL

## 2024-09-16 VITALS
OXYGEN SATURATION: 97 % | WEIGHT: 293 LBS | TEMPERATURE: 97 F | BODY MASS INDEX: 48.82 KG/M2 | DIASTOLIC BLOOD PRESSURE: 86 MMHG | SYSTOLIC BLOOD PRESSURE: 122 MMHG | RESPIRATION RATE: 16 BRPM | HEIGHT: 65 IN

## 2024-09-16 DIAGNOSIS — S83.92XD SPRAIN OF LEFT KNEE, UNSPECIFIED LIGAMENT, SUBSEQUENT ENCOUNTER: ICD-10-CM

## 2024-09-16 PROCEDURE — 3079F DIAST BP 80-89 MM HG: CPT | Performed by: PREVENTIVE MEDICINE

## 2024-09-16 PROCEDURE — 3074F SYST BP LT 130 MM HG: CPT | Performed by: PREVENTIVE MEDICINE

## 2024-09-16 PROCEDURE — 99213 OFFICE O/P EST LOW 20 MIN: CPT | Performed by: PREVENTIVE MEDICINE

## 2024-09-16 PROCEDURE — 1125F AMNT PAIN NOTED PAIN PRSNT: CPT | Performed by: PREVENTIVE MEDICINE

## 2024-09-16 ASSESSMENT — PAIN SCALES - GENERAL: PAINLEVEL: 5=MODERATE PAIN

## 2024-09-16 ASSESSMENT — FIBROSIS 4 INDEX: FIB4 SCORE: 1.37

## 2024-09-16 NOTE — LETTER
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 Yes     Explain:        Objective Medical Findings: Left knee: No gross deformity.  Area of tenderness over the lateral joint line and posterior knee diffusely.  Range of motion slight diminished with knee flexion due to pain.   MRI Left Knee: Evidence of patellofemoral pain syndrome with moderate chondromalacia patella. There is a moderate-sized joint effusion without loose bodies. No internal derangement.          None - Discharged                         Stable  No                 Ratable  No     X   Generally Improved                         Condition Worsened                  Condition Same  May Have Suffered a Permanent Disability No     Treatment Plan:    Continue physical therapy, placed referral for more visits  Gentle range of motion exercises, Increase ambulation as tolerated  Heat/ice as needed and OTC muscle creams/ointments as needed           No Change in Therapy                  PT/OT Prescribed                      Medication May be Used While Working        Case Management                          PT/OT Discontinued    Consultation    Further Diagnostic Studies:    Prescription(s)                 Released to FULL DUTY /No Restrictions on (Date):  From:      Certified TOTALLY TEMPORARILY DISABLED (Indicate Dates) From:   To:    X  Released to RESTRICTED/Modified Duty on (Date): From: 9/16/2024 To: 10/14/2024  Restrictions Are:  Temporary      No Sitting    No Standing     No Pulling Other: Allow to sit for 15 to 20 minutes every 2 hours as needed.        No Bending at Waist     No Stooping     No Lifting        No Carrying     No Walking Lifting Restricted to (lbs.):          No Pushing        No Climbing     No Reaching Above Shoulders       Date of Next Visit:  10/14/2024 @ 1:00 PM Date of this Exam: 9/16/2024 Physician/Chiropractic Physician Name: Tomy Ng D.O. Physician/Chiropractic Physician Signature:  Tomy Ng DO MPH                                                                                                                                                                                                            D-39 (Rev. 2/24)

## 2024-09-16 NOTE — PROGRESS NOTES
Subjective:     Mayra Rojas is a 29 y.o. 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  [place]: Walking in the freezer and tripped on an elevated tile of the floor causing twisting of the left knee and direct impact onto the knee.  She was seen in urgent care x 2, x-rays of the left knee were negative for acute findings.     7/11/2024: Patient states overall symptoms are only little bit improved.  She states she is able to walk low but better than before.  She is not having to use crutches.  She states that pain is mostly on the lateral and posterior aspect of the knee.  She states that she gets occasional instability but not much.  She denies prior left knee injuries.  She states she has been using the knee brace for comfort but sometimes she feels that her knee swells up too much and becomes uncomfortable to use the knee brace.     8/8/2024: Patient states that overall symptoms have been gradually improving.  She states she has done 2 sessions of physical therapy.  She states she has less pain in the knee and little bit more range of motion.  She states she is able to stand/walk for about 1 to 2 hours before she gets too much pain.  She states she feels like lessening the work restrictions and trying to go back to work on modified duty.  Has not heard anything about the MRI being approved yet.    9/16/2024: Patient states overall she has had some gradual improvement symptoms specially with physical therapy.  She states she finished all the approved sessions of therapy.  She states that she still does get pain on the posterior aspect of the knee and does have some instability in the knee especially if she has been on her feet for a long time.  She was able to get the MRI performed this morning.    YAW REILLYX: Works as a a  at [employer]  FH: No  pertinent family history to this problem.       Objective:     There were no vitals taken for this visit.    Constitutional: Patient is in no acute distress. Appears well-developed and well-nourished.   Cardiovascular: Normal rate.    Pulmonary/Chest: Effort normal. No respiratory distress.   Neurological: Patient is alert and oriented to person, place, and time.   Skin: Skin is warm and dry.   Psychiatric: Normal mood and affect. Behavior is normal.     Left knee: No gross deformity.  Area of tenderness over the lateral joint line and posterior knee diffusely.  Range of motion slight diminished with knee flexion due to pain.   MRI Left Knee: Evidence of patellofemoral pain syndrome with moderate chondromalacia patella. There is a moderate-sized joint effusion without loose bodies. No internal derangement.     Assessment/Plan:     1. Sprain of left knee, unspecified ligament, subsequent encounter      Continue physical therapy, placed referral for more visits  Gentle range of motion exercises, Increase ambulation as tolerated  Heat/ice as needed and OTC muscle creams/ointments as needed      Work Status: Restricted Duty - see D-39 or other state/federal worker's compensation forms for specific restrictions if applicable  Follow up 4 weeks    Differential diagnosis, natural history, supportive care, and indications for immediate follow-up discussed.    Approximately 25 minutes were spent in reviewing notes, preparing for visit, obtaining history, exam and evaluation, patient counseling/education, and post visit documentation/orders. Significant time was spent completing state/federal worker's compensation forms.

## 2024-10-28 ENCOUNTER — OCCUPATIONAL MEDICINE (OUTPATIENT)
Dept: OCCUPATIONAL MEDICINE | Facility: CLINIC | Age: 29
End: 2024-10-28
Payer: COMMERCIAL

## 2024-10-28 VITALS
BODY MASS INDEX: 48.82 KG/M2 | TEMPERATURE: 97.6 F | SYSTOLIC BLOOD PRESSURE: 128 MMHG | HEART RATE: 95 BPM | OXYGEN SATURATION: 97 % | HEIGHT: 65 IN | WEIGHT: 293 LBS | DIASTOLIC BLOOD PRESSURE: 80 MMHG | RESPIRATION RATE: 16 BRPM

## 2024-10-28 DIAGNOSIS — S83.92XD SPRAIN OF LEFT KNEE, UNSPECIFIED LIGAMENT, SUBSEQUENT ENCOUNTER: ICD-10-CM

## 2024-10-28 PROCEDURE — 3079F DIAST BP 80-89 MM HG: CPT | Performed by: PREVENTIVE MEDICINE

## 2024-10-28 PROCEDURE — 99212 OFFICE O/P EST SF 10 MIN: CPT | Performed by: PREVENTIVE MEDICINE

## 2024-10-28 PROCEDURE — 3074F SYST BP LT 130 MM HG: CPT | Performed by: PREVENTIVE MEDICINE

## 2024-10-28 ASSESSMENT — FIBROSIS 4 INDEX: FIB4 SCORE: 1.37

## 2025-03-05 ENCOUNTER — HOSPITAL ENCOUNTER (INPATIENT)
Facility: MEDICAL CENTER | Age: 30
LOS: 2 days | DRG: 444 | End: 2025-03-07
Attending: EMERGENCY MEDICINE | Admitting: FAMILY MEDICINE
Payer: MEDICAID

## 2025-03-05 ENCOUNTER — APPOINTMENT (OUTPATIENT)
Dept: RADIOLOGY | Facility: MEDICAL CENTER | Age: 30
DRG: 444 | End: 2025-03-05
Attending: EMERGENCY MEDICINE
Payer: MEDICAID

## 2025-03-05 DIAGNOSIS — K85.90 PANCREATITIS, UNSPECIFIED PANCREATITIS TYPE: ICD-10-CM

## 2025-03-05 DIAGNOSIS — K80.20 CALCULUS OF GALLBLADDER WITHOUT CHOLECYSTITIS WITHOUT OBSTRUCTION: ICD-10-CM

## 2025-03-05 DIAGNOSIS — D50.9 MICROCYTIC ANEMIA: ICD-10-CM

## 2025-03-05 PROBLEM — R74.8 LIVER ENZYME ELEVATION: Status: ACTIVE | Noted: 2025-03-05

## 2025-03-05 LAB
ALBUMIN SERPL BCP-MCNC: 3.8 G/DL (ref 3.2–4.9)
ALBUMIN/GLOB SERPL: 1.1 G/DL
ALP SERPL-CCNC: 185 U/L (ref 30–99)
ALT SERPL-CCNC: 378 U/L (ref 2–50)
ANION GAP SERPL CALC-SCNC: 11 MMOL/L (ref 7–16)
ANISOCYTOSIS BLD QL SMEAR: ABNORMAL
APPEARANCE UR: CLEAR
AST SERPL-CCNC: 397 U/L (ref 12–45)
BACTERIA #/AREA URNS HPF: ABNORMAL /HPF
BASOPHILS # BLD AUTO: 0.5 % (ref 0–1.8)
BASOPHILS # BLD: 0.03 K/UL (ref 0–0.12)
BILIRUB SERPL-MCNC: 1.4 MG/DL (ref 0.1–1.5)
BILIRUB UR QL STRIP.AUTO: ABNORMAL
BUN SERPL-MCNC: 10 MG/DL (ref 8–22)
BURR CELLS BLD QL SMEAR: NORMAL
CALCIUM ALBUM COR SERPL-MCNC: 9.1 MG/DL (ref 8.5–10.5)
CALCIUM SERPL-MCNC: 8.9 MG/DL (ref 8.5–10.5)
CASTS URNS QL MICRO: ABNORMAL /LPF (ref 0–2)
CHLORIDE SERPL-SCNC: 107 MMOL/L (ref 96–112)
CHOLEST SERPL-MCNC: 107 MG/DL (ref 100–199)
CO2 SERPL-SCNC: 20 MMOL/L (ref 20–33)
COLOR UR: ABNORMAL
COMMENT 1642: NORMAL
CREAT SERPL-MCNC: 0.73 MG/DL (ref 0.5–1.4)
EOSINOPHIL # BLD AUTO: 0.15 K/UL (ref 0–0.51)
EOSINOPHIL NFR BLD: 2.4 % (ref 0–6.9)
EPITHELIAL CELLS 1715: ABNORMAL /HPF (ref 0–5)
ERYTHROCYTE [DISTWIDTH] IN BLOOD BY AUTOMATED COUNT: 44.3 FL (ref 35.9–50)
GFR SERPLBLD CREATININE-BSD FMLA CKD-EPI: 112 ML/MIN/1.73 M 2
GLOBULIN SER CALC-MCNC: 3.6 G/DL (ref 1.9–3.5)
GLUCOSE SERPL-MCNC: 99 MG/DL (ref 65–99)
GLUCOSE UR STRIP.AUTO-MCNC: NEGATIVE MG/DL
HCG SERPL QL: NEGATIVE
HCT VFR BLD AUTO: 37.1 % (ref 37–47)
HDLC SERPL-MCNC: 39 MG/DL
HGB BLD-MCNC: 10.6 G/DL (ref 12–16)
HYPOCHROMIA BLD QL SMEAR: ABNORMAL
IMM GRANULOCYTES # BLD AUTO: 0.02 K/UL (ref 0–0.11)
IMM GRANULOCYTES NFR BLD AUTO: 0.3 % (ref 0–0.9)
KETONES UR STRIP.AUTO-MCNC: NEGATIVE MG/DL
LDLC SERPL CALC-MCNC: 53 MG/DL
LEUKOCYTE ESTERASE UR QL STRIP.AUTO: ABNORMAL
LIPASE SERPL-CCNC: >3000 U/L (ref 11–82)
LYMPHOCYTES # BLD AUTO: 1.54 K/UL (ref 1–4.8)
LYMPHOCYTES NFR BLD: 24.3 % (ref 22–41)
MACROCYTES BLD QL SMEAR: ABNORMAL
MCH RBC QN AUTO: 19.9 PG (ref 27–33)
MCHC RBC AUTO-ENTMCNC: 28.6 G/DL (ref 32.2–35.5)
MCV RBC AUTO: 69.7 FL (ref 81.4–97.8)
MICRO URNS: ABNORMAL
MICROCYTES BLD QL SMEAR: ABNORMAL
MONOCYTES # BLD AUTO: 0.39 K/UL (ref 0–0.85)
MONOCYTES NFR BLD AUTO: 6.2 % (ref 0–13.4)
MORPHOLOGY BLD-IMP: NORMAL
NEUTROPHILS # BLD AUTO: 4.2 K/UL (ref 1.82–7.42)
NEUTROPHILS NFR BLD: 66.3 % (ref 44–72)
NITRITE UR QL STRIP.AUTO: NEGATIVE
NRBC # BLD AUTO: 0 K/UL
NRBC BLD-RTO: 0 /100 WBC (ref 0–0.2)
OVALOCYTES BLD QL SMEAR: NORMAL
PH UR STRIP.AUTO: 5.5 [PH] (ref 5–8)
PLATELET # BLD AUTO: 317 K/UL (ref 164–446)
PLATELET BLD QL SMEAR: NORMAL
PMV BLD AUTO: 9.1 FL (ref 9–12.9)
POIKILOCYTOSIS BLD QL SMEAR: NORMAL
POLYCHROMASIA BLD QL SMEAR: NORMAL
POTASSIUM SERPL-SCNC: 4 MMOL/L (ref 3.6–5.5)
PROT SERPL-MCNC: 7.4 G/DL (ref 6–8.2)
PROT UR QL STRIP: NEGATIVE MG/DL
RBC # BLD AUTO: 5.32 M/UL (ref 4.2–5.4)
RBC # URNS HPF: ABNORMAL /HPF (ref 0–2)
RBC BLD AUTO: PRESENT
RBC UR QL AUTO: NEGATIVE
SODIUM SERPL-SCNC: 138 MMOL/L (ref 135–145)
SP GR UR STRIP.AUTO: 1.01
TRIGL SERPL-MCNC: 73 MG/DL (ref 0–149)
UROBILINOGEN UR STRIP.AUTO-MCNC: 1 EU/DL
WBC # BLD AUTO: 6.3 K/UL (ref 4.8–10.8)
WBC #/AREA URNS HPF: ABNORMAL /HPF

## 2025-03-05 PROCEDURE — 85025 COMPLETE CBC W/AUTO DIFF WBC: CPT

## 2025-03-05 PROCEDURE — 36415 COLL VENOUS BLD VENIPUNCTURE: CPT

## 2025-03-05 PROCEDURE — 700111 HCHG RX REV CODE 636 W/ 250 OVERRIDE (IP): Mod: UD | Performed by: EMERGENCY MEDICINE

## 2025-03-05 PROCEDURE — 96374 THER/PROPH/DIAG INJ IV PUSH: CPT

## 2025-03-05 PROCEDURE — 83690 ASSAY OF LIPASE: CPT

## 2025-03-05 PROCEDURE — 84703 CHORIONIC GONADOTROPIN ASSAY: CPT

## 2025-03-05 PROCEDURE — 770006 HCHG ROOM/CARE - MED/SURG/GYN SEMI*

## 2025-03-05 PROCEDURE — 80061 LIPID PANEL: CPT

## 2025-03-05 PROCEDURE — 700111 HCHG RX REV CODE 636 W/ 250 OVERRIDE (IP): Mod: JZ

## 2025-03-05 PROCEDURE — 81001 URINALYSIS AUTO W/SCOPE: CPT

## 2025-03-05 PROCEDURE — 80053 COMPREHEN METABOLIC PANEL: CPT

## 2025-03-05 PROCEDURE — 99285 EMERGENCY DEPT VISIT HI MDM: CPT

## 2025-03-05 PROCEDURE — 700105 HCHG RX REV CODE 258

## 2025-03-05 PROCEDURE — 96375 TX/PRO/DX INJ NEW DRUG ADDON: CPT

## 2025-03-05 PROCEDURE — 76705 ECHO EXAM OF ABDOMEN: CPT

## 2025-03-05 RX ORDER — MORPHINE SULFATE 4 MG/ML
4 INJECTION INTRAVENOUS
Status: DISCONTINUED | OUTPATIENT
Start: 2025-03-05 | End: 2025-03-07 | Stop reason: HOSPADM

## 2025-03-05 RX ORDER — SODIUM CHLORIDE 9 MG/ML
INJECTION, SOLUTION INTRAVENOUS CONTINUOUS
Status: DISCONTINUED | OUTPATIENT
Start: 2025-03-05 | End: 2025-03-07

## 2025-03-05 RX ORDER — HYDROMORPHONE HYDROCHLORIDE 1 MG/ML
1 INJECTION, SOLUTION INTRAMUSCULAR; INTRAVENOUS; SUBCUTANEOUS ONCE
Status: COMPLETED | OUTPATIENT
Start: 2025-03-05 | End: 2025-03-05

## 2025-03-05 RX ORDER — ACETAMINOPHEN 325 MG/1
650 TABLET ORAL EVERY 6 HOURS PRN
Status: DISCONTINUED | OUTPATIENT
Start: 2025-03-05 | End: 2025-03-07 | Stop reason: HOSPADM

## 2025-03-05 RX ORDER — HEPARIN SODIUM 5000 [USP'U]/ML
5000 INJECTION, SOLUTION INTRAVENOUS; SUBCUTANEOUS EVERY 8 HOURS
Status: DISCONTINUED | OUTPATIENT
Start: 2025-03-05 | End: 2025-03-07 | Stop reason: HOSPADM

## 2025-03-05 RX ORDER — ONDANSETRON 4 MG/1
4 TABLET, ORALLY DISINTEGRATING ORAL EVERY 4 HOURS PRN
Status: DISCONTINUED | OUTPATIENT
Start: 2025-03-05 | End: 2025-03-07 | Stop reason: HOSPADM

## 2025-03-05 RX ORDER — ONDANSETRON 2 MG/ML
4 INJECTION INTRAMUSCULAR; INTRAVENOUS ONCE
Status: COMPLETED | OUTPATIENT
Start: 2025-03-05 | End: 2025-03-05

## 2025-03-05 RX ORDER — ONDANSETRON 2 MG/ML
4 INJECTION INTRAMUSCULAR; INTRAVENOUS EVERY 4 HOURS PRN
Status: DISCONTINUED | OUTPATIENT
Start: 2025-03-05 | End: 2025-03-07 | Stop reason: HOSPADM

## 2025-03-05 RX ORDER — OXYCODONE HYDROCHLORIDE 10 MG/1
10 TABLET ORAL
Refills: 0 | Status: DISCONTINUED | OUTPATIENT
Start: 2025-03-05 | End: 2025-03-07 | Stop reason: HOSPADM

## 2025-03-05 RX ORDER — PROMETHAZINE HYDROCHLORIDE 25 MG/1
12.5-25 TABLET ORAL EVERY 4 HOURS PRN
Status: DISCONTINUED | OUTPATIENT
Start: 2025-03-05 | End: 2025-03-07 | Stop reason: HOSPADM

## 2025-03-05 RX ORDER — PROCHLORPERAZINE EDISYLATE 5 MG/ML
5-10 INJECTION INTRAMUSCULAR; INTRAVENOUS EVERY 4 HOURS PRN
Status: DISCONTINUED | OUTPATIENT
Start: 2025-03-05 | End: 2025-03-07 | Stop reason: HOSPADM

## 2025-03-05 RX ORDER — PROMETHAZINE HYDROCHLORIDE 25 MG/1
12.5-25 SUPPOSITORY RECTAL EVERY 4 HOURS PRN
Status: DISCONTINUED | OUTPATIENT
Start: 2025-03-05 | End: 2025-03-07 | Stop reason: HOSPADM

## 2025-03-05 RX ORDER — OXYCODONE HYDROCHLORIDE 5 MG/1
5 TABLET ORAL
Refills: 0 | Status: DISCONTINUED | OUTPATIENT
Start: 2025-03-05 | End: 2025-03-07 | Stop reason: HOSPADM

## 2025-03-05 RX ADMIN — ONDANSETRON 4 MG: 2 INJECTION INTRAMUSCULAR; INTRAVENOUS at 19:23

## 2025-03-05 RX ADMIN — ONDANSETRON 4 MG: 2 INJECTION INTRAMUSCULAR; INTRAVENOUS at 15:08

## 2025-03-05 RX ADMIN — HYDROMORPHONE HYDROCHLORIDE 1 MG: 1 INJECTION, SOLUTION INTRAMUSCULAR; INTRAVENOUS; SUBCUTANEOUS at 15:08

## 2025-03-05 RX ADMIN — SODIUM CHLORIDE: 9 INJECTION, SOLUTION INTRAVENOUS at 20:02

## 2025-03-05 RX ADMIN — HEPARIN SODIUM 5000 UNITS: 5000 INJECTION, SOLUTION INTRAVENOUS; SUBCUTANEOUS at 21:59

## 2025-03-05 SDOH — ECONOMIC STABILITY: TRANSPORTATION INSECURITY
IN THE PAST 12 MONTHS, HAS THE LACK OF TRANSPORTATION KEPT YOU FROM MEDICAL APPOINTMENTS OR FROM GETTING MEDICATIONS?: NO

## 2025-03-05 SDOH — ECONOMIC STABILITY: TRANSPORTATION INSECURITY
IN THE PAST 12 MONTHS, HAS LACK OF RELIABLE TRANSPORTATION KEPT YOU FROM MEDICAL APPOINTMENTS, MEETINGS, WORK OR FROM GETTING THINGS NEEDED FOR DAILY LIVING?: NO

## 2025-03-05 ASSESSMENT — LIFESTYLE VARIABLES
DOES PATIENT WANT TO STOP DRINKING: NO
HAVE YOU EVER FELT YOU SHOULD CUT DOWN ON YOUR DRINKING: NO
CONSUMPTION TOTAL: NEGATIVE
ON A TYPICAL DAY WHEN YOU DRINK ALCOHOL HOW MANY DRINKS DO YOU HAVE: 1
ALCOHOL_USE: YES
HOW MANY TIMES IN THE PAST YEAR HAVE YOU HAD 5 OR MORE DRINKS IN A DAY: 0
EVER HAD A DRINK FIRST THING IN THE MORNING TO STEADY YOUR NERVES TO GET RID OF A HANGOVER: NO
TOTAL SCORE: 0
TOTAL SCORE: 0
EVER FELT BAD OR GUILTY ABOUT YOUR DRINKING: NO
TOTAL SCORE: 0
HAVE PEOPLE ANNOYED YOU BY CRITICIZING YOUR DRINKING: NO
AVERAGE NUMBER OF DAYS PER WEEK YOU HAVE A DRINK CONTAINING ALCOHOL: 0

## 2025-03-05 ASSESSMENT — PATIENT HEALTH QUESTIONNAIRE - PHQ9
1. LITTLE INTEREST OR PLEASURE IN DOING THINGS: NOT AT ALL
SUM OF ALL RESPONSES TO PHQ9 QUESTIONS 1 AND 2: 0
2. FEELING DOWN, DEPRESSED, IRRITABLE, OR HOPELESS: NOT AT ALL

## 2025-03-05 ASSESSMENT — COGNITIVE AND FUNCTIONAL STATUS - GENERAL
MOVING TO AND FROM BED TO CHAIR: A LITTLE
DRESSING REGULAR LOWER BODY CLOTHING: A LITTLE
WALKING IN HOSPITAL ROOM: A LITTLE
DAILY ACTIVITIY SCORE: 21
TOILETING: A LITTLE
HELP NEEDED FOR BATHING: A LITTLE
MOBILITY SCORE: 18
TURNING FROM BACK TO SIDE WHILE IN FLAT BAD: A LITTLE
STANDING UP FROM CHAIR USING ARMS: A LITTLE
SUGGESTED CMS G CODE MODIFIER DAILY ACTIVITY: CJ
CLIMB 3 TO 5 STEPS WITH RAILING: A LITTLE
MOVING FROM LYING ON BACK TO SITTING ON SIDE OF FLAT BED: A LITTLE
SUGGESTED CMS G CODE MODIFIER MOBILITY: CK

## 2025-03-05 ASSESSMENT — SOCIAL DETERMINANTS OF HEALTH (SDOH)
WITHIN THE LAST YEAR, HAVE TO BEEN RAPED OR FORCED TO HAVE ANY KIND OF SEXUAL ACTIVITY BY YOUR PARTNER OR EX-PARTNER?: NO
IN THE PAST 12 MONTHS, HAS THE ELECTRIC, GAS, OIL, OR WATER COMPANY THREATENED TO SHUT OFF SERVICE IN YOUR HOME?: NO
WITHIN THE LAST YEAR, HAVE YOU BEEN HUMILIATED OR EMOTIONALLY ABUSED IN OTHER WAYS BY YOUR PARTNER OR EX-PARTNER?: NO
WITHIN THE LAST YEAR, HAVE YOU BEEN KICKED, HIT, SLAPPED, OR OTHERWISE PHYSICALLY HURT BY YOUR PARTNER OR EX-PARTNER?: NO
WITHIN THE PAST 12 MONTHS, THE FOOD YOU BOUGHT JUST DIDN'T LAST AND YOU DIDN'T HAVE MONEY TO GET MORE: NEVER TRUE
WITHIN THE PAST 12 MONTHS, YOU WORRIED THAT YOUR FOOD WOULD RUN OUT BEFORE YOU GOT THE MONEY TO BUY MORE: NEVER TRUE
WITHIN THE LAST YEAR, HAVE YOU BEEN AFRAID OF YOUR PARTNER OR EX-PARTNER?: NO

## 2025-03-05 ASSESSMENT — FIBROSIS 4 INDEX
FIB4 SCORE: 2
FIB4 SCORE: 1.37

## 2025-03-05 ASSESSMENT — PAIN DESCRIPTION - PAIN TYPE
TYPE: ACUTE PAIN
TYPE: ACUTE PAIN
TYPE: ACUTE PAIN;CHRONIC PAIN

## 2025-03-05 NOTE — ED PROVIDER NOTES
ED Provider Note    CHIEF COMPLAINT  Chief Complaint   Patient presents with    Abdominal Pain     Pt bib remsa reports ruq abdominal pain. +n/v. Hx of gallstones and now has similar symptoms.       EXTERNAL RECORDS REVIEWED  The findings from note completed on 7/29/2024 that reveals evidence of a CT scan with cholelithiasis, ultrasound gallbladder was not visualized and the common bile duct was normal caliber.    HPI/ROS      Mayra Rojas is a 29 y.o. female who presents with complaint of severe abdominal pain.  She has had right upper quadrant abdominal pain that started last night around 7 PM.  She has not anything for last 2 days.  Pain is crampy in nature and radiates to her stomach and upper stomach into her esophagus.  She states it feels very similar to her gallstones that she has had before in the past.  Patient was transferred here via EMS and received IV pain and nausea medication.    PAST MEDICAL HISTORY   has a past medical history of Allergy and Patient denies medical problems.    SURGICAL HISTORY  patient denies any surgical history    FAMILY HISTORY  Family History   Problem Relation Age of Onset    Hypertension Mother     Multiple Sclerosis Sister     Other Sister         Pt states both her sisters have endometriosis    No Known Problems Brother     Diabetes Maternal Grandmother     Cancer Maternal Grandmother         breast       SOCIAL HISTORY  Social History     Tobacco Use    Smoking status: Never    Smokeless tobacco: Never   Vaping Use    Vaping status: Never Used   Substance and Sexual Activity    Alcohol use: No    Drug use: Yes     Types: Inhaled, Marijuana     Comment: Rare    Sexual activity: Yes     Partners: Male     Birth control/protection: Condom       CURRENT MEDICATIONS  Home Medications       Reviewed by Annette Hutchinson (Pharmacy Tech) on 03/05/25 at 1756  Med List Status: Complete     Medication Last Dose Status   Acetaminophen (TYLENOL PO) 2/27/2025 Active  "                 Audit from Redirected Encounters    **Home medications have not yet been reviewed for this encounter**         ALLERGIES  Allergies   Allergen Reactions    Cauliflower [Brassica Oleracea Italica] Anaphylaxis    Nkda [No Known Drug Allergy]        PHYSICAL EXAM  VITAL SIGNS: /73   Pulse 89   Temp 36.8 °C (98.2 °F) (Temporal)   Resp 18   Ht 1.626 m (5' 4\")   Wt (!) 150 kg (330 lb)   LMP 02/09/2025   SpO2 94%   BMI 56.64 kg/m²      Nursing notes and vitals reviewed.  Constitutional: Well developed, Well nourished, moderate acute distress, Non-toxic appearance.   Thorax & Lungs: No respiratory distress, No rales, No rhonchi, No wheezing, No chest tenderness.   Abdomen: Severe right upper quadrant tenderness and guarding, positive Harmon sign protuberant abdomen  Skin: Warm, Dry, No erythema, No rash.   Psychiatric: Anxious and tearful      EKG/LABS  Normal sinus rhythm on monitor  Results for orders placed or performed during the hospital encounter of 03/05/25   CBC WITH DIFFERENTIAL    Collection Time: 03/05/25  2:33 PM   Result Value Ref Range    WBC 6.3 4.8 - 10.8 K/uL    RBC 5.32 4.20 - 5.40 M/uL    Hemoglobin 10.6 (L) 12.0 - 16.0 g/dL    Hematocrit 37.1 37.0 - 47.0 %    MCV 69.7 (L) 81.4 - 97.8 fL    MCH 19.9 (L) 27.0 - 33.0 pg    MCHC 28.6 (L) 32.2 - 35.5 g/dL    RDW 44.3 35.9 - 50.0 fL    Platelet Count 317 164 - 446 K/uL    MPV 9.1 9.0 - 12.9 fL    Neutrophils-Polys 66.30 44.00 - 72.00 %    Lymphocytes 24.30 22.00 - 41.00 %    Monocytes 6.20 0.00 - 13.40 %    Eosinophils 2.40 0.00 - 6.90 %    Basophils 0.50 0.00 - 1.80 %    Immature Granulocytes 0.30 0.00 - 0.90 %    Nucleated RBC 0.00 0.00 - 0.20 /100 WBC    Neutrophils (Absolute) 4.20 1.82 - 7.42 K/uL    Lymphs (Absolute) 1.54 1.00 - 4.80 K/uL    Monos (Absolute) 0.39 0.00 - 0.85 K/uL    Eos (Absolute) 0.15 0.00 - 0.51 K/uL    Baso (Absolute) 0.03 0.00 - 0.12 K/uL    Immature Granulocytes (abs) 0.02 0.00 - 0.11 K/uL    NRBC " (Absolute) 0.00 K/uL    Hypochromia 1+     Anisocytosis 1+     Macrocytosis 1+     Microcytosis 2+ (A)    COMP METABOLIC PANEL    Collection Time: 03/05/25  2:33 PM   Result Value Ref Range    Sodium 138 135 - 145 mmol/L    Potassium 4.0 3.6 - 5.5 mmol/L    Chloride 107 96 - 112 mmol/L    Co2 20 20 - 33 mmol/L    Anion Gap 11.0 7.0 - 16.0    Glucose 99 65 - 99 mg/dL    Bun 10 8 - 22 mg/dL    Creatinine 0.73 0.50 - 1.40 mg/dL    Calcium 8.9 8.5 - 10.5 mg/dL    Correct Calcium 9.1 8.5 - 10.5 mg/dL    AST(SGOT) 397 (H) 12 - 45 U/L    ALT(SGPT) 378 (H) 2 - 50 U/L    Alkaline Phosphatase 185 (H) 30 - 99 U/L    Total Bilirubin 1.4 0.1 - 1.5 mg/dL    Albumin 3.8 3.2 - 4.9 g/dL    Total Protein 7.4 6.0 - 8.2 g/dL    Globulin 3.6 (H) 1.9 - 3.5 g/dL    A-G Ratio 1.1 g/dL   LIPASE    Collection Time: 03/05/25  2:33 PM   Result Value Ref Range    Lipase >3000 (H) 11 - 82 U/L   HCG QUAL SERUM    Collection Time: 03/05/25  2:33 PM   Result Value Ref Range    Beta-Hcg Qualitative Serum Negative Negative   PLATELET ESTIMATE    Collection Time: 03/05/25  2:33 PM   Result Value Ref Range    Plt Estimation Normal    MORPHOLOGY    Collection Time: 03/05/25  2:33 PM   Result Value Ref Range    RBC Morphology Present     Polychromia 1+     Poikilocytosis 1+     Ovalocytes 1+     Echinocytes 1+    PERIPHERAL SMEAR REVIEW    Collection Time: 03/05/25  2:33 PM   Result Value Ref Range    Peripheral Smear Review see below    DIFFERENTIAL COMMENT    Collection Time: 03/05/25  2:33 PM   Result Value Ref Range    Comments-Diff see below    ESTIMATED GFR    Collection Time: 03/05/25  2:33 PM   Result Value Ref Range    GFR (CKD-EPI) 112 >60 mL/min/1.73 m 2   URINALYSIS,CULTURE IF INDICATED    Collection Time: 03/05/25  4:58 PM    Specimen: Urine   Result Value Ref Range    Color Dark Yellow     Character Clear     Specific Gravity 1.013 <1.035    Ph 5.5 5.0 - 8.0    Glucose Negative Negative mg/dL    Ketones Negative Negative mg/dL    Protein  Negative Negative mg/dL    Bilirubin Small (A) Negative    Urobilinogen, Urine 1.0 <=1.0 EU/dL    Nitrite Negative Negative    Leukocyte Esterase Small (A) Negative    Occult Blood Negative Negative    Micro Urine Req Microscopic    URINE MICROSCOPIC (W/UA)    Collection Time: 03/05/25  4:58 PM   Result Value Ref Range    WBC 6-10 (A) /hpf    RBC 0-2 0 - 2 /hpf    Bacteria Moderate (A) None /hpf    Epithelial Cells 3-5 0 - 5 /hpf    Urine Casts 0-2 0 - 2 /lpf       I have independently interpreted this EKG    RADIOLOGY/PROCEDURES   I have independently interpreted the diagnostic imaging associated with this visit and am waiting the final reading from the radiologist.   My preliminary interpretation is as follows: Cholelithiasis on ultrasound    Radiologist interpretation:  US-RUQ   Final Result      1. Cholelithiasis, with no sonographic evidence of acute cholecystitis.   2. Hepatomegaly and hepatic steatosis.          COURSE & MEDICAL DECISION MAKING    ASSESSMENT, COURSE AND PLAN  Care Narrative: This is a pleasant 29-year-old female presents with probably gallstone pancreatitis.  Here in the emergency department her lipase is greater than 3000.  She did have a leukocytosis but her AST, ALT alk phos are elevated.  Ultrasound reveals evidence cholelithiasis but no evidence of common bile duct dilatation, no evidence of cholecystitis.  Patient received IV Dilaudid as well as Zofran and IV fluids as significant resolution of symptoms.  Concerned secondary to her pancreatitis and do believe she will need MRCP, GI consultation and possible surgical consultation.    DISPOSITION AND DISCUSSIONS  I have discussed management of the patient with the following physicians and GIOVANNI's: Discussed the patient with family practice for hospitalization.      FINAL DIAGNOSIS  Gallstone pancreatitis  Cholelithiasis     Electronically signed by: Toni Euceda D.O., 3/5/2025 3:00 PM

## 2025-03-05 NOTE — ED NOTES
Chief Complaint   Patient presents with    Abdominal Pain     Pt bib remsa reports ruq abdominal pain. +n/v. Hx of gallstones and now has similar symptoms.     Was given morphine 3mg and zofran 4mg IV by ems w/relief of pain.

## 2025-03-05 NOTE — LETTER
[place] [address]  [address] [facility] [address]  [phone]     March 7, 2025    Patient: Mayra Rojas   YOB: 1995   Date of Visit: 3/5/2025       To Whom It May Concern:    Mayra Rojas was seen and treated in our department on 3/5-3/7/2025.     Patient is excused from work due to acute illness from 3/03/25 to 3/08/25.     Sincerely,     Esperanza Somers M.D.

## 2025-03-06 ENCOUNTER — APPOINTMENT (OUTPATIENT)
Dept: RADIOLOGY | Facility: MEDICAL CENTER | Age: 30
DRG: 444 | End: 2025-03-06
Payer: MEDICAID

## 2025-03-06 LAB
ALBUMIN SERPL BCP-MCNC: 3.5 G/DL (ref 3.2–4.9)
ALBUMIN/GLOB SERPL: 1.1 G/DL
ALP SERPL-CCNC: 182 U/L (ref 30–99)
ALT SERPL-CCNC: 460 U/L (ref 2–50)
ANION GAP SERPL CALC-SCNC: 11 MMOL/L (ref 7–16)
AST SERPL-CCNC: 348 U/L (ref 12–45)
BASOPHILS # BLD AUTO: 0.6 % (ref 0–1.8)
BASOPHILS # BLD: 0.03 K/UL (ref 0–0.12)
BILIRUB SERPL-MCNC: 1 MG/DL (ref 0.1–1.5)
BUN SERPL-MCNC: 10 MG/DL (ref 8–22)
CALCIUM ALBUM COR SERPL-MCNC: 9 MG/DL (ref 8.5–10.5)
CALCIUM SERPL-MCNC: 8.6 MG/DL (ref 8.5–10.5)
CHLORIDE SERPL-SCNC: 106 MMOL/L (ref 96–112)
CO2 SERPL-SCNC: 19 MMOL/L (ref 20–33)
CREAT SERPL-MCNC: 0.75 MG/DL (ref 0.5–1.4)
EOSINOPHIL # BLD AUTO: 0.2 K/UL (ref 0–0.51)
EOSINOPHIL NFR BLD: 3.7 % (ref 0–6.9)
ERYTHROCYTE [DISTWIDTH] IN BLOOD BY AUTOMATED COUNT: 45.1 FL (ref 35.9–50)
ETHANOL BLD-MCNC: <10.1 MG/DL
FERRITIN SERPL-MCNC: 18.5 NG/ML (ref 10–291)
GFR SERPLBLD CREATININE-BSD FMLA CKD-EPI: 109 ML/MIN/1.73 M 2
GLOBULIN SER CALC-MCNC: 3.1 G/DL (ref 1.9–3.5)
GLUCOSE SERPL-MCNC: 98 MG/DL (ref 65–99)
HAV IGM SERPL QL IA: NORMAL
HBV CORE IGM SER QL: NORMAL
HBV SURFACE AG SER QL: NORMAL
HCT VFR BLD AUTO: 34.1 % (ref 37–47)
HCV AB SER QL: NORMAL
HGB BLD-MCNC: 9.9 G/DL (ref 12–16)
IMM GRANULOCYTES # BLD AUTO: 0.01 K/UL (ref 0–0.11)
IMM GRANULOCYTES NFR BLD AUTO: 0.2 % (ref 0–0.9)
IRON SATN MFR SERPL: 7 % (ref 15–55)
IRON SERPL-MCNC: 24 UG/DL (ref 40–170)
LYMPHOCYTES # BLD AUTO: 1.74 K/UL (ref 1–4.8)
LYMPHOCYTES NFR BLD: 32.1 % (ref 22–41)
MCH RBC QN AUTO: 20.1 PG (ref 27–33)
MCHC RBC AUTO-ENTMCNC: 29 G/DL (ref 32.2–35.5)
MCV RBC AUTO: 69.2 FL (ref 81.4–97.8)
MONOCYTES # BLD AUTO: 0.29 K/UL (ref 0–0.85)
MONOCYTES NFR BLD AUTO: 5.4 % (ref 0–13.4)
NEUTROPHILS # BLD AUTO: 3.15 K/UL (ref 1.82–7.42)
NEUTROPHILS NFR BLD: 58 % (ref 44–72)
NRBC # BLD AUTO: 0 K/UL
NRBC BLD-RTO: 0 /100 WBC (ref 0–0.2)
PLATELET # BLD AUTO: 280 K/UL (ref 164–446)
PMV BLD AUTO: 9.3 FL (ref 9–12.9)
POTASSIUM SERPL-SCNC: 3.7 MMOL/L (ref 3.6–5.5)
PROT SERPL-MCNC: 6.6 G/DL (ref 6–8.2)
RBC # BLD AUTO: 4.93 M/UL (ref 4.2–5.4)
SODIUM SERPL-SCNC: 136 MMOL/L (ref 135–145)
TIBC SERPL-MCNC: 358 UG/DL (ref 250–450)
TRANSFERRIN SERPL-MCNC: 307 MG/DL (ref 200–370)
UIBC SERPL-MCNC: 334 UG/DL (ref 110–370)
WBC # BLD AUTO: 5.4 K/UL (ref 4.8–10.8)

## 2025-03-06 PROCEDURE — 84466 ASSAY OF TRANSFERRIN: CPT

## 2025-03-06 PROCEDURE — 82103 ALPHA-1-ANTITRYPSIN TOTAL: CPT

## 2025-03-06 PROCEDURE — 83550 IRON BINDING TEST: CPT

## 2025-03-06 PROCEDURE — 99222 1ST HOSP IP/OBS MODERATE 55: CPT | Mod: GC | Performed by: FAMILY MEDICINE

## 2025-03-06 PROCEDURE — 74181 MRI ABDOMEN W/O CONTRAST: CPT

## 2025-03-06 PROCEDURE — 80053 COMPREHEN METABOLIC PANEL: CPT

## 2025-03-06 PROCEDURE — 700111 HCHG RX REV CODE 636 W/ 250 OVERRIDE (IP): Mod: JZ

## 2025-03-06 PROCEDURE — 80074 ACUTE HEPATITIS PANEL: CPT

## 2025-03-06 PROCEDURE — 85025 COMPLETE CBC W/AUTO DIFF WBC: CPT

## 2025-03-06 PROCEDURE — A9270 NON-COVERED ITEM OR SERVICE: HCPCS

## 2025-03-06 PROCEDURE — 82077 ASSAY SPEC XCP UR&BREATH IA: CPT

## 2025-03-06 PROCEDURE — 82728 ASSAY OF FERRITIN: CPT

## 2025-03-06 PROCEDURE — 700105 HCHG RX REV CODE 258

## 2025-03-06 PROCEDURE — 770006 HCHG ROOM/CARE - MED/SURG/GYN SEMI*

## 2025-03-06 PROCEDURE — 83540 ASSAY OF IRON: CPT

## 2025-03-06 PROCEDURE — 700102 HCHG RX REV CODE 250 W/ 637 OVERRIDE(OP)

## 2025-03-06 PROCEDURE — 700111 HCHG RX REV CODE 636 W/ 250 OVERRIDE (IP)

## 2025-03-06 RX ADMIN — OXYCODONE HYDROCHLORIDE 10 MG: 10 TABLET ORAL at 20:29

## 2025-03-06 RX ADMIN — SODIUM CHLORIDE: 9 INJECTION, SOLUTION INTRAVENOUS at 13:55

## 2025-03-06 RX ADMIN — HEPARIN SODIUM 5000 UNITS: 5000 INJECTION, SOLUTION INTRAVENOUS; SUBCUTANEOUS at 21:29

## 2025-03-06 RX ADMIN — ONDANSETRON 4 MG: 2 INJECTION INTRAMUSCULAR; INTRAVENOUS at 20:31

## 2025-03-06 RX ADMIN — HEPARIN SODIUM 5000 UNITS: 5000 INJECTION, SOLUTION INTRAVENOUS; SUBCUTANEOUS at 04:09

## 2025-03-06 RX ADMIN — SODIUM CHLORIDE: 9 INJECTION, SOLUTION INTRAVENOUS at 04:10

## 2025-03-06 RX ADMIN — SODIUM CHLORIDE: 9 INJECTION, SOLUTION INTRAVENOUS at 19:43

## 2025-03-06 RX ADMIN — OXYCODONE 5 MG: 5 TABLET ORAL at 15:11

## 2025-03-06 ASSESSMENT — PAIN DESCRIPTION - PAIN TYPE
TYPE: ACUTE PAIN

## 2025-03-06 NOTE — DISCHARGE PLANNING
Case Management Discharge Planning    Admission Date: 3/5/2025  GMLOS: 3.3  ALOS: 1    6-Clicks ADL Score: 21  6-Clicks Mobility Score: 18      Anticipated Discharge Dispo: Discharge Disposition: Discharged to home/self care (01)    DME Needed: No    Action(s) Taken: DC Assessment Complete (See below)    RNCM assisted in establishing PCP for patient via [facility] department    Escalations Completed: None    Medically Clear: No    Next Steps: CM to follow up with IDT regarding DCP needs/barriers    Barriers to Discharge: Medical clearance    Is the patient up for discharge tomorrow: No        Care Transition Team Assessment    Case management role discussed; understanding of case management role verbalized   Demographics on facesheet verified  Please see H&P for pertinent PMH and reason for admission  Patient's PCP is: None-RNCM assisted in establishing PCP appt  [facility]'s [facility] [place], [place]  Housing: Patient lives in an apartment on the first floor with her children (18 and under), her best friend, and her best friend's children (18 and under); no YOSEF  IADLS/ADLS: Independent at baseline  Mental Health Concerns: Denies  Substance Abuse: Denies  Monthly Income/employment status: Question not asked/patient is employed full-time  Financial status: Denies financial concerns  DME: None at baseline  O2: None at baseline  Prior services: Home-Independent  Discharge support: Friends, family  Transportation: Denies needing assistance  Discharge planning: Home with help      Information Source  Orientation Level: Oriented X4  Information Given By: Patient  Who is responsible for making decisions for patient? : Patient    Readmission Evaluation  Is this a readmission?: No    Elopement Risk  Legal Hold: No  Ambulatory or Self Mobile in Wheelchair: Yes  Disoriented: No  Psychiatric Symptoms: None  History of Wandering: No  Elopement this Admit: No  Vocalizing Wanting to Leave: No  Displays  Behaviors, Body Language Wanting to Leave: No-Not at Risk for Elopement  Elopement Risk: Not at Risk for Elopement    Interdisciplinary Discharge Planning  Does Admitting Nurse Feel This Could be a Complex Discharge?: No  Lives with - Patient's Self Care Capacity: Unrelated Adult, Child Less than 18 Years of Age  Patient or legal guardian wants to designate a caregiver: No  Support Systems: Children, Family Member(s), Friends / Neighbors  Housing / Facility: 1 Story Apartment / Condo  Do You Take your Prescribed Medications Regularly: Yes  Able to Return to Previous ADL's: Yes  Mobility Issues: No  Prior Services: None, Home-Independent  Assistance Needed: No    Discharge Preparedness  What is your plan after discharge?: Home with help  What are your discharge supports?: Child, Other (comment)  Prior Functional Level: Ambulatory, Drives Self, Independent with Activities of Daily Living, Independent with Medication Management  Difficulity with ADLs: None  Difficulity with IADLs: None    Functional Assesment  Prior Functional Level: Ambulatory, Drives Self, Independent with Activities of Daily Living, Independent with Medication Management    Finances  Financial Barriers to Discharge: No  Prescription Coverage: Yes    Vision / Hearing Impairment  Vision Impairment : No  Hearing Impairment : No    Values / Beliefs / Concerns  Values / Beliefs Concerns : No    Advance Directive  Advance Directive?: None    Domestic Abuse  Have you ever been the victim of abuse or violence?: No  Physical Abuse or Sexual Abuse: No  Verbal Abuse or Emotional Abuse: No  Possible Abuse/Neglect Reported to:: Not Applicable    Psychological Assessment  History of Substance Abuse: None  History of Psychiatric Problems: No  Non-compliant with Treatment: No  Newly Diagnosed Illness: No    Discharge Risks or Barriers  Discharge risks or barriers?: No PCP  Patient risk factors: Cognitive / sensory / physical deficit, Complex medical needs, Multiple  organizational systems involved, No PCP    Anticipated Discharge Information  Discharge Disposition: Discharged to home/self care (01)

## 2025-03-06 NOTE — ASSESSMENT & PLAN NOTE
Patient with cholelithiasis on right upper quadrant ultrasound. History of prior cholelithiasis in July 2024, was planning for general surgery outpatient follow up but got lost to follow up.   Plan:   -No evidence of gallstone pancreatitis or choledocholithiasis on MRCP, possible that there was prior gallstone pancreatitis but the gallstone was dislodged prior to imaging.  -Will likely need outpatient follow-up with general surgery for discussion of elective cholecystectomy

## 2025-03-06 NOTE — NON-PROVIDER
"Attending: Gavin Gordon M.D.  Student: Coral Menon MS3  Patient Mayra Rojas; [id number]; 1995    ID  Mayra Rojas is a 29 y.o. female who presented to the ED 3/5 with acute RUQ abdominal pain, nausea, and vomiting and was admitted for treatment of pancreatitis and cholelithiasis.    SUBJECTIVE:   Patient reports that pain has improved since last night, although she did experience one episode of vomiting after a meal. Patient denies any new headache, chest pain, or joint pain. She does not take any OTC or prescribed medications at home and does not drink alcohol.    Hospital Course:  The patient experienced pain beginning on 3/4 around 7 pm, described as crampy and radiating to the stomach, back, upper epigastric region, and esophagus. She reported that the pain was similar to what she experienced during her hospitalization for cholelithiasis in July 2024, after which she was advised to follow up with general surgery for a cholecystectomy. In the emergency department, the patient was found to be hypertensive (130s/90s), with a lipase level greater than 3000, AST of 397, ALT of 378, and ALP of 185. Her CBC showed no leukocytosis but mild microcytic anemia with a hemoglobin level of 10.6. Urinalysis indicated a small amount of bilirubin and leukocyte esterase.    OBJECTIVE:  Vitals:    03/05/25 1843 03/05/25 1853 03/05/25 2000 03/06/25 0324   BP:  109/77 120/81 137/81   Pulse:  69 88 85   Resp:  18 18 18   Temp:   37.1 °C (98.7 °F) 36.8 °C (98.3 °F)   TempSrc:  Temporal Temporal Temporal   SpO2:  96% 97% 97%   Weight: (!) 156 kg (343 lb 11.2 oz)      Height: 1.626 m (5' 4.02\")          Intake/Output Summary (Last 24 hours) at 3/6/2025 0648  Last data filed at 3/6/2025 0600  Gross per 24 hour   Intake 1365 ml   Output --   Net 1365 ml       PHYSICAL EXAM  General: Patient appears well-nourished, well-developed, and in no acute distress.  HEENT:  Head: Normocephalic, atraumatic.  Eyes: Conjunctivae " clear, pupils equal, round, and reactive to light. Extraocular movements intact.  Ears: Tympanic membranes clear and intact, no discharge.  Nose: Nasal mucosa moist, no obstruction.  Throat: Oropharynx clear, no lesions, tonsils non-enlarged.  Neck: No lymphadenopathy, thyroid non-enlarged, no carotid bruits.  Cardiovascular: Regular rate and rhythm, no murmurs, rubs, or gallops. Peripheral pulses intact.  Respiratory: Lungs clear to auscultation bilaterally, no wheezes, rales, or rhonchi. Equal breath sounds.  Abdomen: Tenderness in the RUQ with mild guarding and epigastric, rebound tenderness. (+) Harmon's  Extremities: No edema, full range of motion, normal strength. Capillary refill < 2 seconds.  Skin: Warm, dry, and intact. No rashes or lesions noted.  Neurological: Alert and oriented x3. Cranial nerves II-XII grossly intact. Strength 5/5 in all extremities. Sensation intact.    LABS  Recent Labs     03/05/25  1433   WBC 6.3   RBC 5.32   HEMOGLOBIN 10.6*   HEMATOCRIT 37.1   MCV 69.7*   MCH 19.9*   RDW 44.3   PLATELETCT 317   MPV 9.1   NEUTSPOLYS 66.30   LYMPHOCYTES 24.30   MONOCYTES 6.20   EOSINOPHILS 2.40   BASOPHILS 0.50   RBCMORPHOLO Present     Recent Labs     03/05/25  1433   SODIUM 138   POTASSIUM 4.0   CHLORIDE 107   CO2 20   BUN 10   CREATININE 0.73   CALCIUM 8.9   ALBUMIN 3.8     Estimated GFR/CRCL = Estimated Creatinine Clearance: 167.8 mL/min (by C-G formula based on SCr of 0.73 mg/dL).  Recent Labs     03/05/25  1433   GLUCOSE 99     Recent Labs     03/05/25  1433   ASTSGOT 397*   ALTSGPT 378*   TBILIRUBIN 1.4   ALKPHOSPHAT 185*   GLOBULIN 3.6*         IMAGING  US-RUQ   Final Result      1. Cholelithiasis, with no sonographic evidence of acute cholecystitis.   2. Hepatomegaly and hepatic steatosis.          MEDS  Current Facility-Administered Medications   Medication Last Admin    NS infusion New Bag at 03/06/25 0410    heparin injection 5,000 Units 5,000 Units at 03/06/25 0409    acetaminophen  (Tylenol) tablet 650 mg      Pharmacy Consult Request ...Pain Management Review 1 Each      oxyCODONE immediate-release (Roxicodone) tablet 5 mg      Or    oxyCODONE immediate release (Roxicodone) tablet 10 mg      Or    morphine 4 MG/ML injection 4 mg      ondansetron (Zofran) syringe/vial injection 4 mg 4 mg at 03/05/25 1923    ondansetron (Zofran ODT) dispertab 4 mg      promethazine (Phenergan) tablet 12.5-25 mg      promethazine (Phenergan) suppository 12.5-25 mg      prochlorperazine (Compazine) injection 5-10 mg         ASSESSMENT/PLAN:  Mayra is a 29 year old female with a past medical history of obesity, cholelithiasis, and biliary colic admitted for pancreatitis and cholelithiasis.    #Pancreatitis   #Calculus of gallbladder without cholecystitis  The patient presented with nausea, vomiting, right upper quadrant pain, and anorexia. Lipase was >3000, with gallstones noted in the RUQ, but no signs of acute cholecystitis or biliary obstruction. There is a history of elevated liver enzymes, likely related to cholelithiasis or metabolic liver steatosis, not alcohol cirrhosis. Her most recent lipid panel showed triglycerides WNL. US reveals cholelithiasis without evidence of acute cholecystitis and hepatomegaly with hepatic steatosis. Patient had previous incident of biliary colic in the setting of cholelithiasis in July 2024.  -Maintenance fluids  -Pain regimen: tylenol, oxycodone 5/10 mg PRN, IV morphine 4 mg PRN  -Patient will receive MRCP today and possibly ERCP pending GI recommendation     #Liver enzyme elevation  The patient has had chronic elevation in liver enzymes over the past year, with current levels of , , and alkaline phosphatase 182. Seven months ago, liver enzymes were , , and alkaline phosphatase 145. The condition was previously thought to be due to nonalcoholic fatty liver disease, but no prior workup has been done. The patient denies alcohol use.  -Hepatitis  A/B/C testing all returned negative  -Continue to follow alpha-1-antitrypsin    #Microcytic anemia  Mild microcytic anemia that is chronic for the past year with an MCV of 69.7 and hemoglobin of 10.6. Iron studies show normal ferritin (18.5), normal transferrin (307), normal total iron binding, low iron (24), low percent saturation (7)   -Continue to trend CBC    Core Measures:  Fluids:  mL/hr   Lines: PIV  Abx: None   Diet: NPO at midnight   PPX: heparin ppx    CODE STATUS: Full Code    Coral Menon, Student  MS3  [employer] [facility]

## 2025-03-06 NOTE — CARE PLAN
The patient is Stable - Low risk of patient condition declining or worsening    Shift Goals  Clinical Goals: Pain Control to comfort level less than 5/10 within 12 hour shift  Patient Goals: Pain Control, Comfort  Family Goals: GRZEGORZ    Progress made toward(s) clinical / shift goals:Pain at tolerable level, declined pharmacologic regimen. Instructed for NPO post midnight, compliant. Able to make needs known, call light placed within easy reach.  SBA with toileting and ambulation, steady.    Patient is not progressing towards the following goals:      Problem: Pain - Standard  Goal: Alleviation of pain or a reduction in pain to the patient’s comfort goal  Description: Target End Date:  Prior to discharge or change in level of care    Document on Vitals flowsheet    1.  Document pain using the appropriate pain scale per order or unit policy  2.  Educate and implement non-pharmacologic comfort measures (i.e. relaxation, distraction, massage, cold/heat therapy, etc.)  3.  Pain management medications as ordered  4.  Reassess pain after pain med administration per policy  5.  If opiods administered assess patient's response to pain medication is appropriate per POSS sedation scale  6.  Follow pain management plan developed in collaboration with patient and interdisciplinary team (including palliative care or pain specialists if applicable)  Outcome: Not Progressing

## 2025-03-06 NOTE — ASSESSMENT & PLAN NOTE
Patient with chronic elevation in liver enzymes for the past year. Currently , , and alk phos 185. Liver enzymes 7 months ago   Alk Phos 145. Previously thought to be due to nonalcoholic fatty liver disease. Has not had prior work up. Denies alcohol use.  On 3/6, liver enzymes noted to be downtrending though still elevated.  Plan:   - Hepatitis panel within normal limits, pending A1c and alpha-1 antitrypsin  - May need GI follow up for ELF/fibroscan

## 2025-03-06 NOTE — ED NOTES
Med Rec complete per patient   Allergies reviewed  Antibiotics in the past 30 days:no  Anticoagulant in past 14 days:no  [facility] utilizes[facility] [address]

## 2025-03-06 NOTE — ASSESSMENT & PLAN NOTE
Mild microcytic anemia that is chronic for the past year with an MCV of 69.7 and hemoglobin of 10.6. Could be due to iron deficiency anemia, less likely from chronic blood loss or chronic underlying disease.  Plan:   -Iron panel showed decreased iron with decreased percent saturation but normal TIBC.  Also had normal transferrin and ferritin.  Suspicion is still for most likely iron deficiency anemia, but will continue to monitor at this time with recommendation for outpatient follow-up.

## 2025-03-06 NOTE — H&P
FAMILY MEDICINE HISTORY AND PHYSICAL       PATIENT ID:  NAME:  Mayra Rojas  MRN:               [id number]  YOB: 1995    Date of Admission: 3/5/2025     Attending: Gavin Gordon M.d.     Resident: Jenise Mauro M.D.    Primary Care Physician:  Pcp Pt States None    CC:    Chief Complaint   Patient presents with    Abdominal Pain     Pt bib remsa reports ruq abdominal pain. +n/v. Hx of gallstones and now has similar symptoms.     HPI: Mayra Rojas is a 29 y.o. female with past medical history of gallstones and obesity who presented with nausea, vomiting, right upper quadrant pain, and anorexia.  Patient reports that starting last night she began having acute onset right upper quadrant pain now with nausea, vomiting, and diarrhea since then. She states that she has a prior history of gallstones one year ago and was lost to follow up. She denies fever, chills, recent URI, chest pain, or shortness of breath. Patient denies alcohol use. Occasionally uses THC. Not currently on medications.     ERCourse:  Patient presented with reassuring vital signs, afebrile, nontachycardic, blood pressure 130s over 90s and saturating well on room air.  Pertinent labs include a lipase greater than 3000, CMP with a AST of 397, ALT of 378, and alk phos of 185 otherwise normal CMP.  CBC without leukocytosis, mild microcytic anemia with a hemoglobin of 10.6.  UA indicated small amount of bilirubin and leukocyte esterases.  Right upper quadrant ultrasound was performed, liver appeared normal in good poor with hepatomegaly and hepatic steatosis present, mobile gallstones were present with gallbladder wall thickness of 2.9 mm no.  Cholecystic fluid present in common bile duct measuring 5.10 mm. Pt treated with IV fluids and pain meds.     REVIEW OF SYSTEMS:   Ten systems reviewed and were negative except as noted in the HPI.                PAST MEDICAL HISTORY:   has a past medical history of Allergy and  Patient denies medical problems.     PAST SURGICAL HISTORY:   has no past surgical history on file.     FAMILY HISTORY:  family history includes Cancer in her maternal grandmother; Diabetes in her maternal grandmother; Hypertension in her mother; Multiple Sclerosis in her sister; No Known Problems in her brother; Other in her sister.     SOCIAL HISTORY:   Employment: works at a gas station   Living Situation: lives with roommate and kids   Smoking: denies   Etoh: denies   Recreational Drug: THC use     DIET:   Orders Placed This Encounter   Procedures    Diet NPO Restrict to: Sips with Medications     Standing Status:   Standing     Number of Occurrences:   8     Diet NPO Restrict to::   Sips with Medications [3]       ALLERGIES:  Allergies   Allergen Reactions    Cauliflower [Brassica Oleracea Italica] Anaphylaxis    Nkda [No Known Drug Allergy]        OUTPATIENT MEDICATIONS:    Current Facility-Administered Medications:     NS infusion, , Intravenous, Continuous, Jenise Mauro M.D.    heparin injection 5,000 Units, 5,000 Units, Subcutaneous, Q8HRS, Jenise Mauro M.D.    acetaminophen (Tylenol) tablet 650 mg, 650 mg, Oral, Q6HRS PRN, Jenise Mauro M.D.    Notify provider if pain remains uncontrolled, , , CONTINUOUS **AND** Use the Numeric Rating Scale (NRS), Mai-Baker Faces (WBF), or FLACC on regular floors and Critical-Care Pain Observation Tool (CPOT) on ICUs/Trauma to assess pain, , , CONTINUOUS **AND** Pulse Ox, , , CONTINUOUS **AND** Pharmacy Consult Request ...Pain Management Review 1 Each, 1 Each, Other, PHARMACY TO DOSE **AND** If patient difficult to arouse and/or has respiratory depression (respiratory rate of 10 or less), stop any opiates that are currently infusing and call a Rapid Response., , , CONTINUOUS, Jenise Mauro M.D.    oxyCODONE immediate-release (Roxicodone) tablet 5 mg, 5 mg, Oral, Q3HRS PRN **OR** oxyCODONE immediate-release (Roxicodone) tablet 10 mg, 10 mg, Oral, Q3HRS PRN  **OR** morphine 4 MG/ML injection 4 mg, 4 mg, Intravenous, Q3HRS PRN, Jenise Mauro M.D.    ondansetron (Zofran) syringe/vial injection 4 mg, 4 mg, Intravenous, Q4HRS PRN, Jenise Mauro M.D.    ondansetron (Zofran ODT) dispertab 4 mg, 4 mg, Oral, Q4HRS PRN, Jenise Mauro M.D.    promethazine (Phenergan) tablet 12.5-25 mg, 12.5-25 mg, Oral, Q4HRS PRN, Jenise Mauro M.D.    promethazine (Phenergan) suppository 12.5-25 mg, 12.5-25 mg, Rectal, Q4HRS PRN, Jenise Mauro M.D.    prochlorperazine (Compazine) injection 5-10 mg, 5-10 mg, Intravenous, Q4HRS PRN, Jenise Mauro M.D.    Current Outpatient Medications:     Acetaminophen (TYLENOL PO), Take 2 Tablets by mouth one time as needed (pain)., Disp: , Rfl:     PHYSICAL EXAM:  Vitals:    25 1502 25 1602 25 1703 25 1802   BP: 134/83 (!) 149/80 121/58 124/73   Pulse: 90 86 90 89   Resp:   18    Temp:       TempSrc:       SpO2: 95% 95% 93% 94%   Weight:       Height:       , Temp (24hrs), Av.8 °C (98.2 °F), Min:36.8 °C (98.2 °F), Max:36.8 °C (98.2 °F)  , Pulse Oximetry: 94 %, O2 (LPM): 0, O2 Delivery Device: None - Room Air    Physical Exam  Constitutional:       General: She is not in acute distress.     Appearance: She is not toxic-appearing.   HENT:      Mouth/Throat:      Mouth: Mucous membranes are moist.   Eyes:      Extraocular Movements: Extraocular movements intact.      Conjunctiva/sclera: Conjunctivae normal.   Cardiovascular:      Rate and Rhythm: Normal rate and regular rhythm.   Pulmonary:      Effort: Pulmonary effort is normal.      Breath sounds: Normal breath sounds.   Abdominal:      General: There is distension.      Tenderness: There is no abdominal tenderness. There is no guarding or rebound.      Comments: Obese abdomen    Skin:     General: Skin is warm.   Neurological:      General: No focal deficit present.      Mental Status: She is alert and oriented to person, place, and time.   Psychiatric:          Mood and Affect: Mood normal.         Behavior: Behavior normal.       LAB TESTS:   Recent Labs     03/05/25  1433   WBC 6.3   RBC 5.32   HEMOGLOBIN 10.6*   HEMATOCRIT 37.1   MCV 69.7*   MCH 19.9*   MCHC 28.6*   RDW 44.3   PLATELETCT 317   MPV 9.1     Recent Labs     03/05/25  1433   SODIUM 138   POTASSIUM 4.0   CHLORIDE 107   CO2 20   GLUCOSE 99   BUN 10   CREATININE 0.73   CALCIUM 8.9     Recent Labs     03/05/25  1433   ALTSGPT 378*   ASTSGOT 397*   ALKPHOSPHAT 185*   TBILIRUBIN 1.4   LIPASE >3000*   GLUCOSE 99       CULTURES:   Results       Procedure Component Value Units Date/Time    URINALYSIS,CULTURE IF INDICATED [[id number]]  (Abnormal) Collected: 03/05/25 1658    Order Status: Completed Specimen: Urine Updated: 03/05/25 1717     Color Dark Yellow     Character Clear     Specific Gravity 1.013     Ph 5.5     Glucose Negative mg/dL      Ketones Negative mg/dL      Protein Negative mg/dL      Bilirubin Small     Urobilinogen, Urine 1.0 EU/dL      Nitrite Negative     Leukocyte Esterase Small     Occult Blood Negative     Micro Urine Req Microscopic            IMAGES:  US-RUQ   Final Result      1. Cholelithiasis, with no sonographic evidence of acute cholecystitis.   2. Hepatomegaly and hepatic steatosis.        CONSULTS:   None     ASSESSMENT/PLAN:   29 y.o. female with medical history of obesity with BMI of 56 and gallstones admitted for pancreatitis and cholelithiasis.     I anticipate this patient will require at least two midnights for appropriate medical management, necessitating inpatient admission because pancreatitis and cholelithiasis.     * Pancreatitis, unspecified pancreatitis type- (present on admission)  Assessment & Plan  Pt presented with nausea, vomiting, right upper quadrant pain, and anorexia. Lipase >3000. Gallstones present on RUQ but no evidence of acute cholecystitis or biliary obstruction. Hx of high liver enzymes that do not follow alcohol cirrhosis, maybe related to cholelithiasis  picture versus metabolic liver steatosis. Lipid panel 7 months ago normal with normal range triglycerides.   Plan:   - Maintenance fluids overnight and bowel rest   - Pain regimen: tylenol, oxycodone 5-10 mg prn, IV morphine 4 mg prn   - Likely will need ERCP. Given patient is otherwise stable without signs of infectious etiology, day team to consult GI tomorrow.     Calculus of gallbladder without cholecystitis  Assessment & Plan  Patient with cholelithiasis on right upper quadrant ultrasound. History of prior cholelithiasis in July 2024, was planning for general surgery outpatient follow up but got lost to follow up.   Plan:   - Patient likely needs ERCP versus MRCP as there is also presence of pancreatitis for further evaluation   - After GI consult may need to consult general surgery for possible cholecystectomy     Liver enzyme elevation  Assessment & Plan  Patient with chronic elevation in liver enzymes for the past year. Currently , , and alk phos 185. Liver enzymes 7 months ago   Alk Phos 145. Previously thought to be due to nonalcoholic fatty liver disease. Has not had prior work up. Denies alcohol use.   Plan:   - Ordered hepatitis panel, hemoglobin A1c, and alpha1 antitrypsin for further evaluation   - May need GI follow up for ELF/fibroscan     Microcytic anemia  Assessment & Plan  Mild microcytic anemia that is chronic for the past year with an MCV of 69.7 and hemoglobin of 10.6. Could be due to iron deficiency anemia, less likely from chronic blood loss or chronic underlying disease.  Plan:   - Ordered iron panel for further eval         Core Measures:  Fluids:  mL/hr   Lines: PIV  Abx: None   Diet: NPO at midnight   PPX: heparin ppx    CODE Status: Full Code    Jenise Mauro M.D. PGY-3 [employer]

## 2025-03-06 NOTE — PROGRESS NOTES
[facility]GRESS NOTE     Attending: Gavin Gordon M.d.  Senior Resident: Esperanza Somers M.D. (PGY-2)  [place] Resident: Nessa Gallegos D.O. (PGY-1)  PATIENT: Mayra Rojas; [id number]; 1995    Hospital Day # Hospital Day: 2    ID: 29 y.o. female with past medical history of cholelithiasis, obesity, was admitted on 3/5/2025 for most likely gallstone pancreatitis in setting of known cholelithiasis.     24 Hour Events: No acute events overnight     Subjective: Patient this morning states her abdominal pain is still present, mostly over RUQ and mid epigastric area. States she has some appetite this morning, but has been NPO for MRCP. Denies any nausea, vomiting, fevers, chills.    OBJECTIVE:  Temp:  [35.9 °C (96.7 °F)-37.1 °C (98.7 °F)] 35.9 °C (96.7 °F)  Pulse:  [69-89] 78  Resp:  [17-18] 17  BP: ()/(56-81) 111/62  SpO2:  [92 %-98 %] 98 %    Intake/Output Summary (Last 24 hours) at 3/6/2025 1518  Last data filed at 3/6/2025 0600  Gross per 24 hour   Intake 1365 ml   Output --   Net 1365 ml       PE:  Gen: No acute distress, resting comfortably in bed  HEENT: normocephalic, atraumatic, EOMI  Pulm: clear to auscultation bilaterally, no respiratory distress   Cardio: RRR, no M/R/G  Abdom: Tenderness to palpation noted over epigastric area and right upper quadrant, mildly over left upper quadrant.  Negative Harmon sign.  Soft, nondistended, normoactive bowel sounds in all quadrants  Ext: No edema, 2+ pulses bilaterally  Neuro: Grossly intact    LABS:  Recent Labs     03/05/25  1433 03/06/25  0625   WBC 6.3 5.4   RBC 5.32 4.93   HEMOGLOBIN 10.6* 9.9*   HEMATOCRIT 37.1 34.1*   MCV 69.7* 69.2*   MCH 19.9* 20.1*   RDW 44.3 45.1   PLATELETCT 317 280   MPV 9.1 9.3   NEUTSPOLYS 66.30 58.00   LYMPHOCYTES 24.30 32.10   MONOCYTES 6.20 5.40   EOSINOPHILS 2.40 3.70   BASOPHILS 0.50 0.60   RBCMORPHOLO Present  --      Recent Labs     03/05/25  1433 03/06/25  0625   SODIUM 138 136   POTASSIUM 4.0 3.7  "  CHLORIDE 107 106   CO2 20 19*   BUN 10 10   CREATININE 0.73 0.75   CALCIUM 8.9 8.6   ALBUMIN 3.8 3.5     Estimated GFR/CRCL = Estimated Creatinine Clearance: 163.3 mL/min (by C-G formula based on SCr of 0.75 mg/dL).  Recent Labs     03/05/25  1433 03/06/25  0625   GLUCOSE 99 98     Recent Labs     03/05/25  1433 03/06/25  0625   ASTSGOT 397* 348*   ALTSGPT 378* 460*   TBILIRUBIN 1.4 1.0   ALKPHOSPHAT 185* 182*   GLOBULIN 3.6* 3.1             No results for input(s): \"INR\", \"APTT\", \"FIBRINOGEN\" in the last 72 hours.    Invalid input(s): \"DIMER\"    MICROBIOLOGY:   No results found for: \"BLOODCULTU\", \"BLDCULT\", \"BCHOLD\"     IMAGING:   [id number]   Final Result      1.  There is cholelithiasis.      2.  No biliary ductal dilatation or filling defects.      3.  No pancreatic ductal abnormalities identified.      4.  No MRI evidence of pancreatitis.      US-RUQ   Final Result      1. Cholelithiasis, with no sonographic evidence of acute cholecystitis.   2. Hepatomegaly and hepatic steatosis.          MEDS:  Current Facility-Administered Medications   Medication Last Admin    NS infusion New Bag at 03/06/25 1355    heparin injection 5,000 Units 5,000 Units at 03/06/25 0409    acetaminophen (Tylenol) tablet 650 mg      Pharmacy Consult Request ...Pain Management Review 1 Each      oxyCODONE immediate-release (Roxicodone) tablet 5 mg 5 mg at 03/06/25 1511    Or    oxyCODONE immediate release (Roxicodone) tablet 10 mg      Or    morphine 4 MG/ML injection 4 mg      ondansetron (Zofran) syringe/vial injection 4 mg 4 mg at 03/05/25 1923    ondansetron (Zofran ODT) dispertab 4 mg      promethazine (Phenergan) tablet 12.5-25 mg      promethazine (Phenergan) suppository 12.5-25 mg      prochlorperazine (Compazine) injection 5-10 mg         PROBLEM LIST:  Problem Noted   Pancreatitis, Unspecified Pancreatitis Type 3/5/2025   Liver Enzyme Elevation 3/5/2025   Calculus of Gallbladder Without Cholecystitis 3/5/2025   Microcytic " Anemia 3/5/2025       ASSESSMENT/PLAN: 29 y.o. female with past medical history of cholelithiasis, obesity, was admitted on 3/5/2025 for most likely gallstone pancreatitis in setting of known cholelithiasis.    * Pancreatitis, unspecified pancreatitis type- (present on admission)  Assessment & Plan  Pt presented with nausea, vomiting, right upper quadrant pain, and anorexia. Lipase >3000. Gallstones present on RUQ but no evidence of acute cholecystitis or biliary obstruction. Hx of high liver enzymes that do not follow alcohol cirrhosis, maybe related to cholelithiasis picture versus metabolic liver steatosis. Lipid panel 7 months ago normal with normal range triglycerides.  Suspect pancreatitis is most likely in setting of gallstone pancreatitis, less suspicious for alcohol induced versus hypertriglyceridemia versus autoimmune/inherited versus medication related.  Plan:   - Discussed with GI who agreed with MRCP.  MRCP performed today which showed no evidence of biliary ductal dilation, pancreatic ductal abnormalities, or MRI evidence of pancreatitis.  No indication for ERCP at this time.  - Advance diet as tolerated to full  - Pain regimen: tylenol, oxycodone 5-10 mg prn, IV morphine 4 mg prn     Microcytic anemia  Assessment & Plan  Mild microcytic anemia that is chronic for the past year with an MCV of 69.7 and hemoglobin of 10.6. Could be due to iron deficiency anemia, less likely from chronic blood loss or chronic underlying disease.  Plan:   -Iron panel showed decreased iron with decreased percent saturation but normal TIBC.  Also had normal transferrin and ferritin.  Suspicion is still for most likely iron deficiency anemia, but will continue to monitor at this time with recommendation for outpatient follow-up.      Calculus of gallbladder without cholecystitis  Assessment & Plan  Patient with cholelithiasis on right upper quadrant ultrasound. History of prior cholelithiasis in July 2024, was planning for  general surgery outpatient follow up but got lost to follow up.   Plan:   -No evidence of gallstone pancreatitis or choledocholithiasis on MRCP, possible that there was prior gallstone pancreatitis but the gallstone was dislodged prior to imaging.  -Will likely need outpatient follow-up with general surgery for discussion of elective cholecystectomy    Liver enzyme elevation  Assessment & Plan  Patient with chronic elevation in liver enzymes for the past year. Currently , , and alk phos 185. Liver enzymes 7 months ago   Alk Phos 145. Previously thought to be due to nonalcoholic fatty liver disease. Has not had prior work up. Denies alcohol use.  On 3/6, liver enzymes noted to be downtrending though still elevated.  Plan:   - Hepatitis panel within normal limits, pending A1c and alpha-1 antitrypsin  - May need GI follow up for ELF/fibroscan       Core Measures:  Fluids: NS at 200 mL/h  Lines: PIV  Abx: None  Diet: ADAT to regular  PPX: SCDs/TEDs, heparin      #DISPOSITION  -Inpatient to advance diet as tolerated, once tolerating full diet will be medically cleared for discharge    Esperanza Somers M.D.  PGY-2  [facility]

## 2025-03-06 NOTE — ASSESSMENT & PLAN NOTE
Pt presented with nausea, vomiting, right upper quadrant pain, and anorexia. Lipase >3000. Gallstones present on RUQ but no evidence of acute cholecystitis or biliary obstruction. Hx of high liver enzymes that do not follow alcohol cirrhosis, maybe related to cholelithiasis picture versus metabolic liver steatosis. Lipid panel 7 months ago normal with normal range triglycerides.  Suspect pancreatitis is most likely in setting of gallstone pancreatitis, less suspicious for alcohol induced versus hypertriglyceridemia versus autoimmune/inherited versus medication related.  Plan:   - Discussed with GI who agreed with MRCP.  MRCP performed today which showed no evidence of biliary ductal dilation, pancreatic ductal abnormalities, or MRI evidence of pancreatitis.  No indication for ERCP at this time.  - Advance diet as tolerated to full  - Pain regimen: tylenol, oxycodone 5-10 mg prn, IV morphine 4 mg prn

## 2025-03-06 NOTE — PROGRESS NOTES
Rn received pt from transport, alert and oriented, on room air. Pt complaining of intense nausea.

## 2025-03-06 NOTE — PROGRESS NOTES
4 Eyes Skin Assessment Completed by Sanam RN and Amelia RN.    Head WDL  Ears WDL  Nose WDL  Mouth WDL  Neck WDL  Breast/Chest WDL  Shoulder Blades WDL  Spine WDL  (R) Arm/Elbow/Hand WDL  (L) Arm/Elbow/Hand WDL  Abdomen WDL  Groin WDL  Scrotum/Coccyx/Buttocks WDL  (R) Leg Discoloration, mottling, rash  (L) Leg WDL  (R) Heel/Foot/Toe WDL, dry  (L) Heel/Foot/Toe WDL, dry          Devices In Places None      Interventions In Place Pillows    Possible Skin Injury No    Pictures Uploaded Into Epic Yes  Wound Consult Placed N/A  RN Wound Prevention Protocol Ordered No

## 2025-03-07 ENCOUNTER — PHARMACY VISIT (OUTPATIENT)
Dept: PHARMACY | Facility: MEDICAL CENTER | Age: 30
End: 2025-03-07
Payer: COMMERCIAL

## 2025-03-07 VITALS
OXYGEN SATURATION: 94 % | RESPIRATION RATE: 18 BRPM | DIASTOLIC BLOOD PRESSURE: 70 MMHG | HEART RATE: 72 BPM | SYSTOLIC BLOOD PRESSURE: 110 MMHG | TEMPERATURE: 97.8 F | BODY MASS INDEX: 50.02 KG/M2 | WEIGHT: 293 LBS | HEIGHT: 64 IN

## 2025-03-07 LAB
A1AT SERPL-MCNC: 161 MG/DL (ref 90–200)
ALBUMIN SERPL BCP-MCNC: 3.2 G/DL (ref 3.2–4.9)
ALBUMIN/GLOB SERPL: 1 G/DL
ALP SERPL-CCNC: 159 U/L (ref 30–99)
ALT SERPL-CCNC: 319 U/L (ref 2–50)
ANION GAP SERPL CALC-SCNC: 9 MMOL/L (ref 7–16)
AST SERPL-CCNC: 133 U/L (ref 12–45)
BILIRUB SERPL-MCNC: 0.4 MG/DL (ref 0.1–1.5)
BUN SERPL-MCNC: 10 MG/DL (ref 8–22)
CALCIUM ALBUM COR SERPL-MCNC: 8.9 MG/DL (ref 8.5–10.5)
CALCIUM SERPL-MCNC: 8.3 MG/DL (ref 8.5–10.5)
CHLORIDE SERPL-SCNC: 107 MMOL/L (ref 96–112)
CO2 SERPL-SCNC: 19 MMOL/L (ref 20–33)
CREAT SERPL-MCNC: 0.79 MG/DL (ref 0.5–1.4)
GFR SERPLBLD CREATININE-BSD FMLA CKD-EPI: 102 ML/MIN/1.73 M 2
GLOBULIN SER CALC-MCNC: 3.2 G/DL (ref 1.9–3.5)
GLUCOSE SERPL-MCNC: 84 MG/DL (ref 65–99)
POTASSIUM SERPL-SCNC: 3.9 MMOL/L (ref 3.6–5.5)
PROT SERPL-MCNC: 6.4 G/DL (ref 6–8.2)
SODIUM SERPL-SCNC: 135 MMOL/L (ref 135–145)

## 2025-03-07 PROCEDURE — 80053 COMPREHEN METABOLIC PANEL: CPT

## 2025-03-07 PROCEDURE — 700111 HCHG RX REV CODE 636 W/ 250 OVERRIDE (IP)

## 2025-03-07 PROCEDURE — 99238 HOSP IP/OBS DSCHRG MGMT 30/<: CPT | Mod: GC | Performed by: FAMILY MEDICINE

## 2025-03-07 PROCEDURE — 700102 HCHG RX REV CODE 250 W/ 637 OVERRIDE(OP)

## 2025-03-07 PROCEDURE — A9270 NON-COVERED ITEM OR SERVICE: HCPCS

## 2025-03-07 PROCEDURE — 700105 HCHG RX REV CODE 258

## 2025-03-07 PROCEDURE — RXMED WILLOW AMBULATORY MEDICATION CHARGE

## 2025-03-07 RX ORDER — ONDANSETRON 4 MG/1
4 TABLET, ORALLY DISINTEGRATING ORAL EVERY 4 HOURS PRN
Qty: 20 TABLET | Refills: 0 | Status: SHIPPED | OUTPATIENT
Start: 2025-03-07

## 2025-03-07 RX ORDER — OXYCODONE HYDROCHLORIDE 5 MG/1
5 TABLET ORAL EVERY 6 HOURS PRN
Qty: 12 TABLET | Refills: 0 | Status: SHIPPED | OUTPATIENT
Start: 2025-03-07 | End: 2025-03-10

## 2025-03-07 RX ORDER — FERROUS SULFATE 325(65) MG
325 TABLET ORAL
Status: DISCONTINUED | OUTPATIENT
Start: 2025-03-07 | End: 2025-03-07 | Stop reason: HOSPADM

## 2025-03-07 RX ORDER — FERROUS SULFATE 325(65) MG
325 TABLET ORAL
Qty: 30 TABLET | Refills: 0 | Status: SHIPPED | OUTPATIENT
Start: 2025-03-08

## 2025-03-07 RX ORDER — ASCORBIC ACID 500 MG
500 TABLET ORAL DAILY
Qty: 30 TABLET | Refills: 0 | Status: SHIPPED | OUTPATIENT
Start: 2025-03-08

## 2025-03-07 RX ORDER — ASCORBIC ACID 500 MG
500 TABLET ORAL DAILY
Status: DISCONTINUED | OUTPATIENT
Start: 2025-03-07 | End: 2025-03-07 | Stop reason: HOSPADM

## 2025-03-07 RX ADMIN — SODIUM CHLORIDE: 9 INJECTION, SOLUTION INTRAVENOUS at 00:52

## 2025-03-07 RX ADMIN — HEPARIN SODIUM 5000 UNITS: 5000 INJECTION, SOLUTION INTRAVENOUS; SUBCUTANEOUS at 05:48

## 2025-03-07 RX ADMIN — FERROUS SULFATE TAB 325 MG (65 MG ELEMENTAL FE) 325 MG: 325 (65 FE) TAB at 09:09

## 2025-03-07 RX ADMIN — SODIUM CHLORIDE: 9 INJECTION, SOLUTION INTRAVENOUS at 05:53

## 2025-03-07 RX ADMIN — OXYCODONE HYDROCHLORIDE AND ACETAMINOPHEN 500 MG: 500 TABLET ORAL at 09:09

## 2025-03-07 ASSESSMENT — PAIN DESCRIPTION - PAIN TYPE: TYPE: ACUTE PAIN

## 2025-03-07 NOTE — PROGRESS NOTES
Discharge orders received.  Patient arrived to the discharge lounge.  PIV removed.  Instructions given, medications reviewed and general discharge education provided to patient.  Follow up appointments discussed.  Patient verbalized understanding of dc instructions and prescriptions.  Patient signed discharge instructions.  Patient verbalized understanding had all belongings with her.  Patient left with meds and family.  Wished patient a speedy recovery.

## 2025-03-07 NOTE — CARE PLAN
The patient is Stable - Low risk of patient condition declining or worsening    Shift Goals  Clinical Goals: Patient will rate pain <5 throughout shift  Patient Goals: comfort  Family Goals: none present    Progress made toward(s) clinical / shift goals:  Pt alert and oriented, makes needs known. Medications administered per MAR. Ate all of dinner with GI soft diet, reports slight nausea. No emesis this shift. Bed locked and in lowest position.    Patient is not progressing towards the following goals:

## 2025-03-07 NOTE — DISCHARGE SUMMARY
[facility] Discharge Summary    Attending: Gavin Gordon M.d.  Senior Resident: Dr. Esperanza Somers   Intern:  Dr. Nessa Gallegos  Contact Number: [phone]    CHIEF COMPLAINT ON ADMISSION  Chief Complaint   Patient presents with    Abdominal Pain     Pt bib remsa reports ruq abdominal pain. +n/v. Hx of gallstones and now has similar symptoms.       Reason for Admission  abd pain     Admission Date  3/5/2025    CODE STATUS  Full Code    HPI & HOSPITAL COURSE  This is a 29 y.o. female w/PMHx cholelithiasis, obesity, here with gallstone pancreatitis in setting of known cholelithiasis.      HPI: Mayra Rojas is a 29 y.o. female with past medical history of gallstones and obesity who presented with nausea, vomiting, right upper quadrant pain, and anorexia.  Patient reports that starting last night she began having acute onset right upper quadrant pain now with nausea, vomiting, and diarrhea since then. She states that she has a prior history of gallstones one year ago and was lost to follow up. She denies fever, chills, recent URI, chest pain, or shortness of breath. Patient denies alcohol use. Occasionally uses THC. Not currently on medications.      ERCourse:  Patient presented with reassuring vital signs, afebrile, nontachycardic, blood pressure 130s over 90s and saturating well on room air.  Pertinent labs include a lipase greater than 3000, CMP with a AST of 397, ALT of 378, and alk phos of 185 otherwise normal CMP.  CBC without leukocytosis, mild microcytic anemia with a hemoglobin of 10.6.  UA indicated small amount of bilirubin and leukocyte esterases.  Right upper quadrant ultrasound was performed, liver appeared normal in good poor with hepatomegaly and hepatic steatosis present, mobile gallstones were present with gallbladder wall thickness of 2.9 mm no.  Cholecystic fluid present in common bile duct measuring 5.10 mm. Pt treated with IV fluids and pain meds.     Hospital Course:  Patient  admitted on 3/5 for pain control, bowel rest, and IVF for treatment of pancreatitis as well as MRCP for possible gallstone pancreatitis. Patient had MRCP performed 3/6 which was overall negative, suspect most likely passage of gallstone before MRCP was performed. No history of alcohol use, triglyceridemia, and low suspicion for medication associated, autoimmune, or hereditary pancreatitis. Patient advanced diet to full on morning of 3/7 with only mild amount of nausea well-controlled with Zofran, no vomiting. Significant improvement in abdominal pain. Discussed with patient that there was no indication while inpatient for acute cholecystectomy and that she should follow up with general surgery as outpatient for scheduling of elective cholecystectomy.     Therefore, she is discharged in good and stable condition to home with close outpatient follow-up.    The patient met 2-midnight criteria for an inpatient stay at the time of discharge.    Discharge Date  3/7/25    Physical Exam on Day of Discharge  Physical Exam  Constitutional:       General: She is not in acute distress.     Appearance: Normal appearance. She is obese. She is not ill-appearing or toxic-appearing.   HENT:      Head: Normocephalic and atraumatic.      Mouth/Throat:      Mouth: Mucous membranes are moist.      Pharynx: Oropharynx is clear. No oropharyngeal exudate or posterior oropharyngeal erythema.   Eyes:      Extraocular Movements: Extraocular movements intact.      Conjunctiva/sclera: Conjunctivae normal.      Pupils: Pupils are equal, round, and reactive to light.   Cardiovascular:      Rate and Rhythm: Normal rate and regular rhythm.      Pulses: Normal pulses.      Heart sounds: Normal heart sounds. No murmur heard.     No gallop.   Pulmonary:      Effort: Pulmonary effort is normal. No respiratory distress.      Breath sounds: Normal breath sounds. No wheezing.   Abdominal:      General: Bowel sounds are normal. There is no distension.       Palpations: Abdomen is soft.      Tenderness: There is no guarding.      Comments: Mild tenderness to palpation over epigastric area.    Skin:     General: Skin is warm and dry.   Neurological:      General: No focal deficit present.      Mental Status: She is alert and oriented to person, place, and time.       FOLLOW UP ITEMS POST DISCHARGE  - Follow up with general surgery as outpatient for elective cholecystectomy  - Schedule follow up with PCP, referral to primary care placed    DISCHARGE DIAGNOSES  Principal Problem:    Pancreatitis, unspecified pancreatitis type (POA: Yes)  Active Problems:    Liver enzyme elevation (POA: Unknown)    Calculus of gallbladder without cholecystitis (POA: Unknown)    Microcytic anemia (POA: Unknown)  Resolved Problems:    * No resolved hospital problems. *      FOLLOW UP  Future Appointments   Date Time Provider Department Center   3/20/2025  9:30 AM [facility]     No follow-up provider specified.    MEDICATIONS ON DISCHARGE     Medication List        START taking these medications        Instructions   ascorbic acid 500 MG tablet  Start taking on: March 8, 2025  Commonly known as: Vitamin C   Take 1 Tablet by mouth every day.  Dose: 500 mg     ferrous sulfate 325 (65 Fe) MG tablet  Start taking on: March 8, 2025   Take 1 Tablet by mouth every morning with breakfast.  Dose: 325 mg     ondansetron 4 MG Tbdp  Commonly known as: Zofran ODT   Dissolve 1 Tablet by mouth every four hours as needed for Nausea/Vomiting  Dose: 4 mg     oxyCODONE immediate-release 5 MG Tabs  Commonly known as: Roxicodone   Take 1 Tablet by mouth every 6 hours as needed for Severe Pain for up to 3 days.  Dose: 5 mg            CONTINUE taking these medications        Instructions   TYLENOL PO   Take 2 Tablets by mouth one time as needed (pain).  Dose: 2 Tablet              Allergies  Allergies   Allergen Reactions    Cauliflower [Brassica Oleracea Italica] Anaphylaxis    Nkda  [No Known Drug Allergy]        DIET  Orders Placed This Encounter   Procedures    Diet Order Diet: Regular (Allergic to Cauliflower)     Standing Status:   Standing     Number of Occurrences:   1     Diet::   Regular [1]              Allergic to Cauliflower       ACTIVITY  As tolerated.  Weight bearing as tolerated    CONSULTATIONS  None    PROCEDURES  MRCP    LABORATORY  Lab Results   Component Value Date    SODIUM 135 03/07/2025    POTASSIUM 3.9 03/07/2025    CHLORIDE 107 03/07/2025    CO2 19 (L) 03/07/2025    GLUCOSE 84 03/07/2025    BUN 10 03/07/2025    CREATININE 0.79 03/07/2025        Lab Results   Component Value Date    WBC 5.4 03/06/2025    HEMOGLOBIN 9.9 (L) 03/06/2025    HEMATOCRIT 34.1 (L) 03/06/2025    PLATELETCT 280 03/06/2025        Total time of the discharge process exceeds 30 minutes.    Esperanza Somers M.D.  PGY-2  [facility]

## 2025-03-07 NOTE — CARE PLAN
The patient is Stable - Low risk of patient condition declining or worsening    Shift Goals  Clinical Goals: Patient's pain will be controlled to comfort level less than 5/10 within 12 hour shift  Patient Goals: Rest, Comfort, Discharge Soon  Family Goals: GRZEGORZ    Progress made toward(s) clinical / shift goals: Medicated per MAR for complaints of pain, with some relief verbalized. Patient tolerated diet, had mild nausea but negative for vomiting. Able to make needs known, call light placed within easy reach.  SBA with toileting and ambulation, steady.      Patient is not progressing towards the following goals:      Problem: Pain - Standard  Goal: Alleviation of pain or a reduction in pain to the patient’s comfort goal  Description: Target End Date:  Prior to discharge or change in level of care    Document on Vitals flowsheet    1.  Document pain using the appropriate pain scale per order or unit policy  2.  Educate and implement non-pharmacologic comfort measures (i.e. relaxation, distraction, massage, cold/heat therapy, etc.)  3.  Pain management medications as ordered  4.  Reassess pain after pain med administration per policy  5.  If opiods administered assess patient's response to pain medication is appropriate per POSS sedation scale  6.  Follow pain management plan developed in collaboration with patient and interdisciplinary team (including palliative care or pain specialists if applicable)  Outcome: Not Progressing

## 2025-03-13 NOTE — Clinical Note
REFERRAL APPROVAL NOTICE         Sent on March 13, 2025                   Mayra Gomezrio Dalila Rojas  [address]  [place] [address]                   Dear Ms. Rojas,    After a careful review of the medical information and benefit coverage, [facility] has processed your referral. See below for additional details.    If applicable, you must be actively enrolled with your insurance for coverage of the authorized service. If you have any questions regarding your coverage, please contact your insurance directly.    REFERRAL INFORMATION   Referral #:  [id number]  Referred-To Department    Referred-By Provider:  Surgery    Nessa Gallegos D.O.   Department Of Surgery      [address]  [place] [address]  [phone] [address], [address]  [place] [id number] [phone]    Referral Start Date:  03/07/2025  Referral End Date:   03/07/2026             SCHEDULING  If you do not already have an appointment, please call [phone] to make an appointment.     MORE INFORMATION  If you do not already have a [employer] account, sign up at: [email or web address]  You can access your medical information, make appointments, see lab results, billing information, and more.  If you have questions regarding this referral, please contact  the [facility] Referrals department at:             [phone]. Monday - Friday 8:00AM - 5:00PM.     Sincerely,    [facility]

## 2025-03-21 ENCOUNTER — TELEPHONE (OUTPATIENT)
Dept: HEALTH INFORMATION MANAGEMENT | Facility: OTHER | Age: 30
End: 2025-03-21
Payer: MEDICAID